# Patient Record
Sex: MALE | Race: WHITE
[De-identification: names, ages, dates, MRNs, and addresses within clinical notes are randomized per-mention and may not be internally consistent; named-entity substitution may affect disease eponyms.]

---

## 2019-06-15 ENCOUNTER — HOSPITAL ENCOUNTER (INPATIENT)
Dept: HOSPITAL 97 - ER | Age: 59
LOS: 2 days | Discharge: HOME | DRG: 190 | End: 2019-06-17
Attending: INTERNAL MEDICINE | Admitting: INTERNAL MEDICINE
Payer: COMMERCIAL

## 2019-06-15 DIAGNOSIS — J44.0: Primary | ICD-10-CM

## 2019-06-15 DIAGNOSIS — F17.210: ICD-10-CM

## 2019-06-15 DIAGNOSIS — Z99.81: ICD-10-CM

## 2019-06-15 DIAGNOSIS — J18.9: ICD-10-CM

## 2019-06-15 DIAGNOSIS — J44.1: ICD-10-CM

## 2019-06-15 LAB
ALBUMIN SERPL BCP-MCNC: 3.7 G/DL (ref 3.4–5)
ALP SERPL-CCNC: 60 U/L (ref 45–117)
ALT SERPL W P-5'-P-CCNC: 21 U/L (ref 12–78)
AST SERPL W P-5'-P-CCNC: 17 U/L (ref 15–37)
BUN BLD-MCNC: 16 MG/DL (ref 7–18)
COHGB MFR BLDA: 1.7 % (ref 0–1.5)
GLUCOSE SERPLBLD-MCNC: 119 MG/DL (ref 74–106)
HCT VFR BLD CALC: 43.1 % (ref 39.6–49)
INR BLD: 1.25
LYMPHOCYTES # SPEC AUTO: 0.8 K/UL (ref 0.7–4.9)
MAGNESIUM SERPL-MCNC: 1.8 MG/DL (ref 1.8–2.4)
MORPHOLOGY BLD-IMP: (no result)
NT-PROBNP SERPL-MCNC: 201 PG/ML (ref ?–125)
OXYHGB MFR BLDA: 96.9 % (ref 94–97)
PMV BLD: 9.2 FL (ref 7.6–11.3)
POTASSIUM SERPL-SCNC: 4 MMOL/L (ref 3.5–5.1)
RBC # BLD: 4.9 M/UL (ref 4.33–5.43)
SAO2 % BLDA: 99.3 % (ref 92–98.5)
TROPONIN (EMERG DEPT USE ONLY): < 0.02 NG/ML (ref 0–0.04)
UA COMPLETE W REFLEX CULTURE PNL UR: (no result)
UA DIPSTICK W REFLEX MICRO PNL UR: (no result)

## 2019-06-15 PROCEDURE — 85025 COMPLETE CBC W/AUTO DIFF WBC: CPT

## 2019-06-15 PROCEDURE — 83735 ASSAY OF MAGNESIUM: CPT

## 2019-06-15 PROCEDURE — 71045 X-RAY EXAM CHEST 1 VIEW: CPT

## 2019-06-15 PROCEDURE — 94660 CPAP INITIATION&MGMT: CPT

## 2019-06-15 PROCEDURE — 36415 COLL VENOUS BLD VENIPUNCTURE: CPT

## 2019-06-15 PROCEDURE — 87088 URINE BACTERIA CULTURE: CPT

## 2019-06-15 PROCEDURE — 80048 BASIC METABOLIC PNL TOTAL CA: CPT

## 2019-06-15 PROCEDURE — 96365 THER/PROPH/DIAG IV INF INIT: CPT

## 2019-06-15 PROCEDURE — 81003 URINALYSIS AUTO W/O SCOPE: CPT

## 2019-06-15 PROCEDURE — 85610 PROTHROMBIN TIME: CPT

## 2019-06-15 PROCEDURE — 99285 EMERGENCY DEPT VISIT HI MDM: CPT

## 2019-06-15 PROCEDURE — 94640 AIRWAY INHALATION TREATMENT: CPT

## 2019-06-15 PROCEDURE — 87040 BLOOD CULTURE FOR BACTERIA: CPT

## 2019-06-15 PROCEDURE — 83605 ASSAY OF LACTIC ACID: CPT

## 2019-06-15 PROCEDURE — 81015 MICROSCOPIC EXAM OF URINE: CPT

## 2019-06-15 PROCEDURE — 84145 PROCALCITONIN (PCT): CPT

## 2019-06-15 PROCEDURE — 83880 ASSAY OF NATRIURETIC PEPTIDE: CPT

## 2019-06-15 PROCEDURE — 93005 ELECTROCARDIOGRAM TRACING: CPT

## 2019-06-15 PROCEDURE — 96361 HYDRATE IV INFUSION ADD-ON: CPT

## 2019-06-15 PROCEDURE — 71046 X-RAY EXAM CHEST 2 VIEWS: CPT

## 2019-06-15 PROCEDURE — 87070 CULTURE OTHR SPECIMN AEROBIC: CPT

## 2019-06-15 PROCEDURE — 80076 HEPATIC FUNCTION PANEL: CPT

## 2019-06-15 PROCEDURE — 80053 COMPREHEN METABOLIC PANEL: CPT

## 2019-06-15 PROCEDURE — 84484 ASSAY OF TROPONIN QUANT: CPT

## 2019-06-15 PROCEDURE — 82805 BLOOD GASES W/O2 SATURATION: CPT

## 2019-06-15 PROCEDURE — 87205 SMEAR GRAM STAIN: CPT

## 2019-06-15 PROCEDURE — 87086 URINE CULTURE/COLONY COUNT: CPT

## 2019-06-15 PROCEDURE — 96375 TX/PRO/DX INJ NEW DRUG ADDON: CPT

## 2019-06-15 PROCEDURE — 87804 INFLUENZA ASSAY W/OPTIC: CPT

## 2019-06-15 PROCEDURE — 94760 N-INVAS EAR/PLS OXIMETRY 1: CPT

## 2019-06-15 RX ADMIN — ENOXAPARIN SODIUM SCH MG: 40 INJECTION SUBCUTANEOUS at 09:03

## 2019-06-15 RX ADMIN — SODIUM CHLORIDE SCH MLS: 0.9 INJECTION, SOLUTION INTRAVENOUS at 09:08

## 2019-06-15 RX ADMIN — NICOTINE SCH MG: 21 PATCH TRANSDERMAL at 09:03

## 2019-06-15 RX ADMIN — METHYLPREDNISOLONE SODIUM SUCCINATE SCH MG: 125 INJECTION, POWDER, FOR SOLUTION INTRAMUSCULAR; INTRAVENOUS at 18:26

## 2019-06-15 RX ADMIN — METHYLPREDNISOLONE SODIUM SUCCINATE SCH MG: 125 INJECTION, POWDER, FOR SOLUTION INTRAMUSCULAR; INTRAVENOUS at 12:29

## 2019-06-15 RX ADMIN — Medication SCH ML: at 21:23

## 2019-06-15 RX ADMIN — METHYLPREDNISOLONE SODIUM SUCCINATE SCH MG: 125 INJECTION, POWDER, FOR SOLUTION INTRAMUSCULAR; INTRAVENOUS at 05:12

## 2019-06-15 RX ADMIN — Medication SCH: at 09:00

## 2019-06-15 RX ADMIN — SODIUM CHLORIDE SCH MLS: 0.9 INJECTION, SOLUTION INTRAVENOUS at 16:54

## 2019-06-15 RX ADMIN — CEFTRIAXONE SCH MLS: 1 INJECTION, SOLUTION INTRAVENOUS at 06:07

## 2019-06-15 RX ADMIN — SODIUM CHLORIDE SCH MLS: 0.9 INJECTION, SOLUTION INTRAVENOUS at 05:12

## 2019-06-15 RX ADMIN — SODIUM CHLORIDE SCH: 0.9 INJECTION, SOLUTION INTRAVENOUS at 14:14

## 2019-06-15 NOTE — EKG
Test Date:    2019-06-15               Test Time:    01:11:17

Technician:   SCOOBY                                     

                                                     

MEASUREMENT RESULTS:                                       

Intervals:                                           

Rate:         105                                    

NJ:           134                                    

QRSD:         82                                     

QT:           338                                    

QTc:          446                                    

Axis:                                                

P:            79                                     

NJ:           134                                    

QRS:          114                                    

T:            60                                     

                                                     

INTERPRETIVE STATEMENTS:                                       

                                                     

Sinus tachycardia

Rightward  QRS axis

Abnormal ECG

Compared to ECG 03/16/2007 00:07:53

Sinus rhythm no longer present

no significant change from previous ECG

Electronically Signed On 06-15-19 08:04:59 CDT by Jacob Orlando

## 2019-06-15 NOTE — P.HP
Certification for Inpatient


Patient admitted to: Inpatient


With expected LOS: >2 Midnights


Practitioner: I am a practitioner with admitting privileges, knowledge of 

patient current condition, hospital course, and medical plan of care.


Services: Services provided to patient in accordance with Admission 

requirements found in Title 42 Section 412.3 of the Code of Federal Regulations





Patient History


Date of Service: 06/15/19


Reason for admission: COPD exacerbation


History of Present Illness: 





Mr Taylor is a 59 years old male with history of COPD on home oxygen 2 L per [place]

, tobacco abuse about 2 ppd, who start a couple of days ago with more 

productive cough. His sputum color is yellow, it change 2 days ago from white. 

Also start with progressive SOB, today the patient had fever, 104 F at home. He 

then came to ED for evaluation. At arrival O2 sat 93% on RA, WBC elevated 22.9K

, elevated procalcitonin with normal lactate. CXR consistent with possible 

multifocal pneumonia.


Home medications list reviewed: Yes





- Past Medical/Surgical History


-: COPD


-: tobacco abuse


-: mastoidectomy


-: aapendectomy





- Family History


Family History: Reviewed- Non-Contributory





- Social History


Smoking Status: Current every day smoker


Counseled patient to stop smoking for: less than 10 minutes


Smoking therapy provided: Yes


Patient receptive to therapy: Yes


CD- Drugs: No


Place of Residence: Home





Review of Systems


10-point ROS is otherwise unremarkable





Physical Examination





- Physical Exam


General: Alert


HEENT: Atraumatic, PERRLA, Mucous membr. moist/pink, EOMI, Sclerae nonicteric


Neck: Supple, 2+ carotid pulse no bruit, No LAD, Without JVD or thyroid 

abnormality


Respiratory: Diminished, Crackles/rales (bibasilar crackles)


Cardiovascular: Regular rate/rhythm, Normal S1 S2


Gastrointestinal: Normal bowel sounds, No tenderness


Musculoskeletal: No tenderness


Integumentary: No rashes


Neurological: Normal speech, Normal strength at 5/5 x4 extr, Normal tone, 

Normal affect


Lymphatics: No axilla or inguinal lymphadenopathy





- Studies


Laboratory Data (last 24 hrs)





06/15/19 01:05: PT 14.6 H, INR 1.25


06/15/19 01:05: WBC 22.6 H*, Hgb 14.5, Hct 43.1, Plt Count 309


06/15/19 01:05: Sodium 135 L, Potassium 4.0, BUN 16, Creatinine 1.01, Glucose 

119 H, Magnesium 1.8, Total Bilirubin 2.0 H, AST 17, ALT 21, Alkaline 

Phosphatase 60





Microbiology Data (last 24 hrs): 








06/15/19 01:10   Nasopharnyx   Influenza Type A Antigen Screen - Final


06/15/19 01:10   Nasopharnyx   Influenza Type B Antigen Screen - Final








Assessment and Plan





- Problems (Diagnosis)


(1) COPD exacerbation


Current Visit: Yes   Status: Acute   





(2) Multifocal pneumonia


Current Visit: Yes   Status: Acute   





(3) Tobacco abuse


Current Visit: Yes   Status: Acute   





- Plan





Will start empiric treatment with  IV Rocephin, Azithromycin and steroids. 

Continue with breathing treatment as needed. Continue O2 support.





- Advance Directives


Does patient have a Living Will: No


Does patient have a Durable POA for Healthcare: No





- Code Status/Comfort Care


Code Status Assessed: Yes


Code Status: Full Code

## 2019-06-15 NOTE — EDPHYS
Physician Documentation                                                                           

 [facility]                                                                 

Name: Jaxon Taylor                                                                             

Age: 59 yrs                                                                                       

Sex: Male                                                                                         

: 1960                                                                                   

MRN: [id number]                                                                                   

Arrival Date: 06/15/2019                                                                          

Time: 00:54                                                                                       

Account#: [id number]                                                                            

Bed 2                                                                                             

Private MD:                                                                                       

ED Physician Renzo Ferreira                                                                      

HPI:                                                                                              

06/15                                                                                             

01:09 This 59 yrs old  Male presents to ER via Wheelchair with complaints of         liza 

      Breathing Difficulty.                                                                       

01:09 The patient has shortness of breath at rest, with light activity. Onset: The            liza 

      symptoms/episode began/occurred 2 day(s) ago. Duration: The symptoms are continuous,        

      and are steadily getting worse. The patient's shortness of breath has no apparent           

      modifying factors. Associated signs and symptoms: The patient has no apparent               

      associated signs or symptoms. Severity of symptoms: At their worst the symptoms were        

      moderate severe in the emergency department the symptoms are unchanged. The patient has     

      experienced similar episodes in the past, multiple times.                                   

                                                                                                  

Historical:                                                                                       

- Allergies:                                                                                      

01:07 No Known Allergies;                                                                     ak1 

- Home Meds:                                                                                      

01:07 ProAir HFA inhalation inhalation [Active]; Symbicort inhalation inhalation [Active];    ak1 

      Albuterol Nebulizer [Active];                                                               

- PMHx:                                                                                           

01:07 COPD; Emphysema;                                                                        ak1 

- PSHx:                                                                                           

01:07 Mastoidectomy; Appendectomy;                                                            ak1 

                                                                                                  

- Immunization history:: Adult Immunizations unknown.                                             

- Social history:: Smoking status: Patient uses tobacco products, smokes one pack                 

  cigarettes per day.                                                                             

- Ebola Screening: : No symptoms or risks identified at this time.                                

- Family history:: not pertinent.                                                                 

                                                                                                  

                                                                                                  

ROS:                                                                                              

01:09 Eyes: Negative for injury, pain, redness, and discharge, ENT: Negative for injury,      liza 

      pain, and discharge, Neck: Negative for injury, pain, and swelling, Cardiovascular:         

      Negative for chest pain, palpitations, and edema, Abdomen/GI: Negative for abdominal        

      pain, nausea, vomiting, diarrhea, and constipation, Back: Negative for injury and pain,     

      : Negative for injury, bleeding, discharge, and swelling, MS/Extremity: Negative for      

      injury and deformity, Skin: Negative for injury, rash, and discoloration, Neuro:            

      Negative for headache, weakness, numbness, tingling, and seizure, Psych: Negative for       

      depression, anxiety, suicide ideation, homicidal ideation, and hallucinations,              

      Allergy/Immunology: Negative for hives, rash, and allergies, Endocrine: Negative for        

      neck swelling, polydipsia, polyuria, polyphagia, and marked weight changes,                 

      Hematologic/Lymphatic: Negative for swollen nodes, abnormal bleeding, and unusual           

      bruising.                                                                                   

01:09 Constitutional: Positive for body aches, chills, fatigue, fever.                            

01:09 Respiratory: Positive for cough, shortness of breath, wheezing, inspiratory, expiratory.    

                                                                                                  

Exam:                                                                                             

:09 Head/Face:  Normocephalic, atraumatic. Eyes:  Pupils equal round and reactive to light, liza 

      extra-ocular motions intact.  Lids and lashes normal.  Conjunctiva and sclera are           

      non-icteric and not injected.  Cornea within normal limits.  Periorbital areas with no      

      swelling, redness, or edema. ENT:  Nares patent. No nasal discharge, no septal              

      abnormalities noted.  Tympanic membranes are normal and external auditory canals are        

      clear.  Oropharynx with no redness, swelling, or masses, exudates, or evidence of           

      obstruction, uvula midline.  Mucous membranes moist. Neck:  Trachea midline, no             

      thyromegaly or masses palpated, and no cervical lymphadenopathy.  Supple, full range of     

      motion without nuchal rigidity, or vertebral point tenderness.  No Meningismus.             

      Chest/axilla:  Normal chest wall appearance and motion.  Nontender with no deformity.       

      No lesions are appreciated. Abdomen/GI:  Soft, non-tender, with normal bowel sounds.        

      No distension or tympany.  No guarding or rebound.  No evidence of tenderness               

      throughout. Back:  No spinal tenderness.  No costovertebral tenderness.  Full range of      

      motion. Male :  Normal genitalia with no discharge or lesions. Skin:  Warm, dry with      

      normal turgor.  Normal color with no rashes, no lesions, and no evidence of cellulitis.     

      MS/ Extremity:  Pulses equal, no cyanosis.  Neurovascular intact.  Full, normal range       

      of motion. Neuro:  Awake and alert, GCS 15, oriented to person, place, time, and            

      situation.  Cranial nerves II-XII grossly intact.  Motor strength 5/5 in all                

      extremities.  Sensory grossly intact.  Cerebellar exam normal.  Normal gait. Psych:         

      Awake, alert, with orientation to person, place and time.  Behavior, mood, and affect       

      are within normal limits.                                                                   

:09 Constitutional: The patient appears febrile.                                                

:09 Cardiovascular: Rate: tachycardic, Rhythm: regular, Pulses: Pulses are 4+ in bilateral      

      radial, brachial, femoral, popliteal, posterior tibial and and dorsalis pedis               

      arteries.. Heart sounds: normal, Edema: is not appreciated, JVD: is not appreciated.        

                                                                                                  

Vital Signs:                                                                                      

01:03  / 76; Pulse 102; Resp 28; Temp 100.1(O); Pulse Ox 93% on R/A; Weight 70.31 kg    ak1 

      (R); Height 5 ft. 11 in. (180.34 cm) (R); Pain 0/10;                                        

01:50  / 66; Pulse 101; Resp 30; Temp 100.1; Pulse Ox 98% on BiPAP;                     rr5 

02:30  / 62; Pulse 102; Resp 29; Pulse Ox 93% on BiPAP;                                 rr5 

03:23 BP 96 / 70; Pulse 91; Resp 29; Temp 99; Pulse Ox 95% on BiPAP;                          rr5 

01:03 Body Mass Index 21.62 (70.31 kg, 180.34 cm)                                             ak1 

01:03 pt uses 2L via NC at home                                                               ak1 

01:50 with ongoing nebulization                                                               rr5 

                                                                                                  

MDM:                                                                                              

01:00 Patient medically screened.                                                             [facility] 

01:16 Data reviewed: vital signs, nurses notes, lab test result(s), EKG, radiologic studies,  liza 

      plain films.                                                                                

                                                                                                  

06/15                                                                                             

01:08 Order name: Basic Metabolic Panel                                                       [facility] 

06/15                                                                                             

01:08 Order name: CBC with Diff                                                               [facility] 

06/15                                                                                             

01:08 Order name: LFT's                                                                       [facility] 

06/15                                                                                             

01:08 Order name: Magnesium; Complete Time: 02:15                                             [facility] 

06/15                                                                                             

01:08 Order name: NT PRO-BNP                                                                  [facility] 

06/15                                                                                             

01:08 Order name: PT-INR; Complete Time: 02:15                                                [facility] 

06/15                                                                                             

01:08 Order name: Troponin (emerg Dept Use Only); Complete Time: 02:15                        [facility] 

06/15                                                                                             

01:08 Order name: Blood Culture Adult (2)                                                     [facility] 

06/15                                                                                             

01:08 Order name: Lactate; Complete Time: 02:15                                               [facility] 

06/15                                                                                             

01:08 Order name: Procalcitonin; Complete Time: 02:15                                         [facility] 

06/15                                                                                             

01:08 Order name: ABG; Complete Time: 02:15                                                   [facility] 

06/15                                                                                             

01:08 Order name: Flu; Complete Time: 02:15                                                   [facility] 

06/15                                                                                             

01:11 Order name: Basic Metabolic Panel; Complete Time: 02:15                                 [facility]

06/15                                                                                             

01:11 Order name: CBC with Automated Diff                                                     [facility]

06/15                                                                                             

01:08 Order name: XRAY Chest (1 view)                                                         [facility] 

06/15                                                                                             

01:08 Order name: EKG; Complete Time: 01:13                                                   [facility] 

06/15                                                                                             

01:08 Order name: Cardiac monitoring; Complete Time: 01:15                                    [facility] 

06/15                                                                                             

01:08 Order name: BIPAP                                                                       [facility] 

06/15                                                                                             

01:11 Order name: Liver (Hepatic) Function; Complete Time: 02:15                              [facility]

06/15                                                                                             

01:12 Order name: NT PRO-BNP; Complete Time: 02:15                                            [facility]

06/15                                                                                             

01:58 Order name: Manual Differential                                                         [facility]

06/15                                                                                             

01:08 Order name: EKG - Nurse/Tech; Complete Time: 01:15                                      [facility] 

06/15                                                                                             

01:08 Order name: IV Saline Lock; Complete Time: 01:15                                        [facility] 

06/15                                                                                             

01:08 Order name: Labs collected and sent; Complete Time: 01:14                               [facility] 

06/15                                                                                             

01:08 Order name: O2 Per Protocol; Complete Time: 01:14                                       [facility] 

06/15                                                                                             

01:08 Order name: O2 Sat Monitoring; Complete Time: 01:15                                     [facility] 

                                                                                                  

Administered Medications:                                                                         

01:20 Drug: Pepcid 20 mg Route: IVP; Site: right forearm;                                     rr5 

02:20 Follow up: Response: No adverse reaction                                                rr5 

01:25 Drug: SOLU-Medrol 125 mg Route: IVP; Site: right forearm;                               rr5 

02:25 Follow up: Response: No adverse reaction                                                rr5 

01:25 Drug: Albuterol - atroVENT (3:1) (2.5 mg - 0.5 mg) 3 ml Route: Nebulizer;               rr5 

02:20 Follow up: Response: No adverse reaction; Marked relief of symptoms                     rr5 

01:40 Drug: NS 0.9% 500 ml Route: IV; Rate: bolus; Site: right forearm;                       rr5 

02:20 Follow up: Response: No adverse reaction; IV Status: Completed infusion; IV Intake:     rr5 

      500ml                                                                                       

01:40 Drug: Zofran 4 mg Route: IVP; Site: right forearm;                                      rr5 

02:40 Follow up: Response: No adverse reaction                                                rr5 

01:41 Drug: NS 0.9% 1000 ml Route: IV; Rate: 125 ml/hr; Site: right forearm;                  rr5 

03:16 Follow up: Response: No adverse reaction; IV Status: Infusion continued upon admission; rr5 

      IV Intake: 125ml                                                                            

01:50 Drug: Zosyn 3.375 grams Route: IVPB; Infused Over: 60 mins; Site: right forearm;        rr5 

02:50 Follow up: Response: No adverse reaction; IV Status: Completed infusion; IV Intake:     rr5 

      100ml                                                                                       

02:15 Drug: Tylenol 650 mg Route: PO;                                                         rr5 

03:15 Follow up: Response: No adverse reaction                                                rr5 

02:51 Dru mg of (Zithromax 500 mg, NS 0.9% 250 ml) Route: IVPB; Infused Over: 1 hrs;    rr5 

      Site: right forearm;                                                                        

                                                                                                  

                                                                                                  

Disposition:                                                                                      

06/15/19 01:16 Hospitalization ordered by Violeta Morse for Inpatient Admission. Preliminary    

  diagnosis are Fever, unspecified, Pneumonia due to other specified bacteria,                    

  Hypoxemia, Tobacco abuse counseling, Tobacco use, Chronic obstructive                           

  pulmonary disease with (acute) exacerbation, Elevated white blood cell count,                   

  Bandemia.                                                                                       

- Bed requested for Telemetry/MedSurg (Inpatient).                                                

- Status is Inpatient Admission.                                                              rr5 

- Condition is Fair.                                                                              

- Problem is new.                                                                                 

- Symptoms have improved.                                                                         

UTI on Admission? No                                                                              

                                                                                                  

                                                                                                  

                                                                                                  

Signatures:                                                                                       

Dispatcher [employer]                           EDRenzo Espinosa MD MD cha Therrien, Shelly, FNP-C                 FNP-Csnw                                                  

Malu Du RN                       RN   ak1                                                  

Elana Velarde RN                       RN                                                      

Devon Gamez RN                      RN   rr5                                                  

                                                                                                  

Corrections: (The following items were deleted from the chart)                                    

02:27 01:16 Hospitalization Ordered by Violeta Morse MD for Inpatient Admission. Preliminary [facility] 

      diagnosis is Fever, unspecified; Pneumonia due to other specified bacteria; Hypoxemia;      

      Tobacco abuse counseling; Tobacco use; Chronic obstructive pulmonary disease with           

      (acute) exacerbation. Bed requested for Telemetry/MedSurg (Inpatient). Status is            

      Inpatient Admission. Condition is Fair. Problem is new. Symptoms have improved. UTI on      

      Admission? No. liza                                                                          

02:50 02:27 06/15/2019 01:16 Hospitalization Ordered by Violeta Morse MD for Inpatient       liza 

      Admission. Preliminary diagnosis is Fever, unspecified; Pneumonia due to other              

      specified bacteria; Hypoxemia; Tobacco abuse counseling; Tobacco use; Chronic               

      obstructive pulmonary disease with (acute) exacerbation; Elevated white blood cell          

      count. Bed requested for Telemetry/MedSurg (Inpatient). Status is Inpatient Admission.      

      Condition is Fair. Problem is new. Symptoms have improved. UTI on Admission? No. liza        

03:10 02:50 06/15/2019 01:16 Hospitalization Ordered by Violeta Morse MD for Inpatient       cg  

      Admission. Preliminary diagnosis is Fever, unspecified; Pneumonia due to other              

      specified bacteria; Hypoxemia; Tobacco abuse counseling; Tobacco use; Chronic               

      obstructive pulmonary disease with (acute) exacerbation; Elevated white blood cell          

      count; Bandemia. Bed requested for Telemetry/MedSurg (Inpatient). Status is Inpatient       

      Admission. Condition is Fair. Problem is new. Symptoms have improved. UTI on Admission?     

      No. liza                                                                                     

03:55 03:10 06/15/2019 01:16 Hospitalization Ordered by Violeta Morse MD for Inpatient       rr5 

      Admission. Preliminary diagnosis is Fever, unspecified; Pneumonia due to other              

      specified bacteria; Hypoxemia; Tobacco abuse counseling; Tobacco use; Chronic               

      obstructive pulmonary disease with (acute) exacerbation; Elevated white blood cell          

      count; Bandemia. Bed requested for Telemetry/MedSurg (Inpatient). Status is Inpatient       

      Admission. Condition is Fair. Problem is new. Symptoms have improved. UTI on Admission?     

      No. cg                                                                                      

                                                                                                  

**************************************************************************************************

## 2019-06-15 NOTE — ER
Nurse's Notes                                                                                     

 [facility]                                                                 

Name: Jaxon Taylor                                                                             

Age: 59 yrs                                                                                       

Sex: Male                                                                                         

: 1960                                                                                   

MRN: [id number]                                                                                   

Arrival Date: 06/15/2019                                                                          

Time: 00:54                                                                                       

Account#: [id number]                                                                            

Bed 2                                                                                             

Private MD:                                                                                       

Diagnosis: Fever, unspecified;Pneumonia due to other specified bacteria;Hypoxemia;Tobacco abuse   

  counseling;Tobacco use;Chronic obstructive pulmonary disease with (acute)                       

  exacerbation;Elevated white blood cell count;Bandemia                                           

                                                                                                  

Presentation:                                                                                     

06/15                                                                                             

01:04 Presenting complaint: Patient states: SOB, vomiting, fever started today. pt stated     ak1 

      fever of 104 at home today. pt took advil at 2000, vomited after. pt uses 2L oxygen via     

      NC at home. Transition of care: patient was not received from another setting of care.      

      Onset of symptoms was Kendy 15, 2019. Risk Assessment: Do you want to hurt yourself or       

      someone else? Patient reports no desire to harm self or others. Initial Sepsis Screen:      

      Does the patient meet any 2 criteria? RR > 20 per min. Temp <36.0*C (96.8*F)) or >          

      38.3*C (100.9*F). Yes Does the patient have a suspected source of infection? Yes:           

      Productive cough/pneumonia If YES to both, name of provider notified: Renzo Ferreira MD. Care prior to arrival: None.                                                            

01:04 Acuity: FROILAN 1                                                                           ak1 

01:04 Method Of Arrival: Wheelchair                                                           ak1 

                                                                                                  

Triage Assessment:                                                                                

01:07 General: Appears distressed, uncomfortable, slender, Behavior is calm, cooperative.     ak1 

      Pain: Denies pain. EENT: No signs and/or symptoms were reported regarding the EENT          

      system. Neuro: Level of Consciousness is awake, alert, obeys commands, Oriented to          

      person, place, time, situation,  are equal bilaterally Moves all extremities. Gait     

      is unsteady, Speech is normal. Cardiovascular: No deficits noted. Respiratory: Reports      

      shortness of breath since today cough that is non-productive, labored breathing Airway      

      is patent Trachea midline Respiratory effort is labored, gasping, with retractions,         

      Respiratory pattern is tachypnea Breath sounds with wheezes Onset: The symptoms/episode     

      began/occurred today, the patient has moderate shortness of breath. GI: Abdomen is          

      flat, Bowel sounds present X 4 quads. Abd is soft and non tender X 4 quads. Reports         

      vomiting. : No signs and/or symptoms were reported regarding the genitourinary            

      system. Derm: No signs and/or symptoms reported regarding the dermatologic system.          

      Musculoskeletal: No signs and/or symptoms reported regarding the musculoskeletal system.    

                                                                                                  

Historical:                                                                                       

- Allergies:                                                                                      

01:07 No Known Allergies;                                                                     ak1 

- Home Meds:                                                                                      

01:07 ProAir HFA inhalation inhalation [Active]; Symbicort inhalation inhalation [Active];    ak1 

      Albuterol Nebulizer [Active];                                                               

- PMHx:                                                                                           

01:07 COPD; Emphysema;                                                                        ak1 

- PSHx:                                                                                           

01:07 Mastoidectomy; Appendectomy;                                                            ak1 

                                                                                                  

- Immunization history:: Adult Immunizations unknown.                                             

- Social history:: Smoking status: Patient uses tobacco products, smokes one pack                 

  cigarettes per day.                                                                             

- Ebola Screening: : No symptoms or risks identified at this time.                                

- Family history:: not pertinent.                                                                 

                                                                                                  

                                                                                                  

Screenin:10 Abuse screen: Denies threats or abuse. Denies injuries from another. Nutritional        ak1 

      screening: No deficits noted. Tuberculosis screening: No symptoms or risk factors           

      identified. Fall Risk None identified.                                                      

                                                                                                  

Assessment:                                                                                       

01:05 General: Appears uncomfortable, Behavior is calm, cooperative, appropriate for age,     rr5 

      Reports fever for. Pain: Complains of pain in head Pain does not radiate. Pain Quality      

      of pain is described as aching, Pain began gradually, Is intermittent. Neuro: Level of      

      Consciousness is awake, alert, obeys commands, Oriented to person, place, time,             

      situation, Appropriate for age. Cardiovascular: Capillary refill < 3 seconds Patient's      

      skin is warm and dry. Rhythm is sinus tachycardia. Respiratory: Reports shortness of        

      breath using oxygen 2 L via nasal cannula at home. Airway is patent Respiratory effort      

      is labored, pursed lip, Respiratory pattern is tachypnea.                                   

01:05 GI: No signs and/or symptoms were reported involving the gastrointestinal system. :   rr5 

      No signs and/or symptoms were reported regarding the genitourinary system. EENT: No         

      signs and/or symptoms were reported regarding the EENT system. Derm: Skin is intact,        

      Skin temperature is warm. Musculoskeletal: Capillary refill < 3 seconds.                    

01:05 Respiratory: Breath sounds with wheezes bilaterally.                                    rr5 

01:30 Reassessment: No changes from previously documented assessment. hooked to BIPAP.        rr5 

01:40 Reassessment: vomited once. greenish and thick in consistency. ED provider aware with   rr5 

      order made d carried out.                                                                   

01:57 Reassessment: noman from laboratory relayed critical laboratory WBC 22.6. ED provider  rr5 

      aware.                                                                                      

02:25 Reassessment: Patient appears in no apparent distress at this time. Patient is alert,   rr5 

      oriented x 3, equal unlabored respirations, skin warm/dry/pink. asleep on bed. nausea       

      and vomiting resolved as stated by patient. Patient states feeling better. Patient          

      states symptoms have improved.                                                              

                                                                                                  

Vital Signs:                                                                                      

01:03  / 76; Pulse 102; Resp 28; Temp 100.1(O); Pulse Ox 93% on R/A; Weight 70.31 kg    ak1 

      (R); Height 5 ft. 11 in. (180.34 cm) (R); Pain 0/10;                                        

01:50  / 66; Pulse 101; Resp 30; Temp 100.1; Pulse Ox 98% on BiPAP;                     rr5 

02:30  / 62; Pulse 102; Resp 29; Pulse Ox 93% on BiPAP;                                 rr5 

03:23 BP 96 / 70; Pulse 91; Resp 29; Temp 99; Pulse Ox 95% on BiPAP;                          rr5 

01:03 Body Mass Index 21.62 (70.31 kg, 180.34 cm)                                             ak1 

01:03 pt uses 2L via NC at home                                                               ak1 

01:50 with ongoing nebulization                                                               rr5 

                                                                                                  

ED Course:                                                                                        

00:54 Patient arrived in ED.                                                                  es  

01:00 Renzo Ferreira MD is Attending Physician.                                             [facility] 

01:05 Triage completed.                                                                       ak1 

01:07 Arm band placed on Patient placed in an exam room, on a stretcher, on oxygen, on        ak1 

      cardiac monitor, on pulse oximetry, Patient notified of wait time. EKG completed in         

      triage. Results shown to MD.                                                                

01:10 Patient has correct armband on for positive identification. Placed in gown. Bed in low  ak1 

      position. Call light in reach. Side rails up X2. Adult w/ patient. Cardiac monitor on.      

      Pulse ox on. NIBP on. Warm blanket given.                                                   

01:10 Initial lab(s) drawn, by ED staff, held in ED. EKG done, by ED staff, reviewed by Renzo Ferreira MD. Inserted saline lock: 20 gauge in right forearm, using aseptic technique.      

      ,using aseptic technique. placed by Devon CALDWELL Blood collected. Oxygen administration       

      via nasal cannula \T\ 4L/min.                                                                 

01:13 Violeta Morse MD is Hospitalizing Provider.                                          [facility] 

01:24 X-ray completed. Portable x-ray completed in exam room. Patient tolerated procedure     kw  

      well.                                                                                       

01:25 XRAY Chest (1 view) In Process Unspecified.                                             EDMS

01:40 Devon Gamez RN is Primary Nurse.                                                    rr5 

02:48 Notified ED physician of a critical lab result(s). Band count of 17% Dr Jhon hernandez  

      notified.                                                                                   

03:24 No provider procedures requiring assistance completed. Patient admitted, IV remains in  rr5 

      place. intact, No redness/swelling at site.                                                 

                                                                                                  

Administered Medications:                                                                         

01:20 Drug: Pepcid 20 mg Route: IVP; Site: right forearm;                                     rr5 

02:20 Follow up: Response: No adverse reaction                                                rr5 

01:25 Drug: SOLU-Medrol 125 mg Route: IVP; Site: right forearm;                               rr5 

02:25 Follow up: Response: No adverse reaction                                                rr5 

01:25 Drug: Albuterol - atroVENT (3:1) (2.5 mg - 0.5 mg) 3 ml Route: Nebulizer;               rr5 

02:20 Follow up: Response: No adverse reaction; Marked relief of symptoms                     rr5 

01:40 Drug: NS 0.9% 500 ml Route: IV; Rate: bolus; Site: right forearm;                       rr5 

02:20 Follow up: Response: No adverse reaction; IV Status: Completed infusion; IV Intake:     rr5 

      500ml                                                                                       

01:40 Drug: Zofran 4 mg Route: IVP; Site: right forearm;                                      rr5 

02:40 Follow up: Response: No adverse reaction                                                rr5 

01:41 Drug: NS 0.9% 1000 ml Route: IV; Rate: 125 ml/hr; Site: right forearm;                  rr5 

03:16 Follow up: Response: No adverse reaction; IV Status: Infusion continued upon admission; rr5 

      IV Intake: 125ml                                                                            

01:50 Drug: Zosyn 3.375 grams Route: IVPB; Infused Over: 60 mins; Site: right forearm;        rr5 

02:50 Follow up: Response: No adverse reaction; IV Status: Completed infusion; IV Intake:     rr5 

      100ml                                                                                       

02:15 Drug: Tylenol 650 mg Route: PO;                                                         rr5 

03:15 Follow up: Response: No adverse reaction                                                rr5 

02:51 Dru mg of (Zithromax 500 mg, NS 0.9% 250 ml) Route: IVPB; Infused Over: 1 hrs;    rr5 

      Site: right forearm;                                                                        

                                                                                                  

                                                                                                  

Intake:                                                                                           

02:20 IV: 500ml; Total: 500ml.                                                                rr5 

02:50 IV: 100ml; Total: 600ml.                                                                rr5 

03:16 IV: 125ml; Total: 725ml.                                                                rr5 

                                                                                                  

Outcome:                                                                                          

01:16 Decision to Hospitalize by Provider.                                                    liza 

03:35 Admitted to Med/surg accompanied by tech, via stretcher, room 206, with oxygen, with    rr5 

      chart, Report called to  lluvia                                                              

03:35 Condition: stable                                                                           

03:35 Instructed on the need for admit.                                                           

03:55 Patient left the ED.                                                                    rr5 

                                                                                                  

Signatures:                                                                                       

Dispatcher [employer]                           Renzo Dubois MD MD cha Salyer, Edna es Ballard, Brenda, RN                     RN   Marianela Teixeira Amber, RN                       RN   ak1                                                  

Devon Gamez, JAVI                      RN   rr5                                                  

                                                                                                  

**************************************************************************************************

## 2019-06-15 NOTE — PN
Date of Progress Note:  06/15/2019



Subjective:  The patient seen and examined.  Chart reviewed and case discussed with RN.  The patient 
states still having significant cough with sputum production.  Had high fevers at home.  The patient 
did spike temperature 100.1 early this morning.



Medications:  List reviewed.



Physical Examination:

Vital Signs:  Temperature 98.4, heart rate 88, blood pressure 103/59, respirations 20, O2 94% on room
 air. 

General:  Awake, alert, oriented x3.  Appears older than stated age, in some mild respiratory distres
s. 

CV:  S1 and S2.  Regular rate and rhythm.  Peripheral pulses present. 

Respiratory:  Diminished breath sounds, diffuse wheezing. 

Gastrointestinal:  Abdomen is soft, nontender, nondistended.  Positive bowel sounds. 

Extremities:  No clubbing, cyanosis, or edema. 

Neurologic:  Nonfocal.



Laboratory Data:  Lactate is 1.8.  Procalcitonin 0.83.  WBC 22.6, H and H 14.5/43.1, platelets 309, 1
7% bands.  Blood cultures and urine culture pending.



Assessment And Plan:  A 59-year-old male with

1.Acute chronic obstructive pulmonary disease exacerbation.  Continue with nebulizer treatments, sup
plemental oxygen.  Pulmonology has been consulted.

2.Multifocal pneumonia.  We will continue with IV antibiotics.  Follow up on cultures.

3.Nicotine dependence with cigarette smoking, continuous.  The patient has been re-counseled. 



Plan:  We will continue with current treatment, follow up on culture results, likely discharge in the
 next 24-48 hours depending on clinical response.





/DEVON

DD:  06/15/2019 10:02:20Voice ID:  [id number]

DT:  06/15/2019 13:57:31Report ID:  [id number]

## 2019-06-16 LAB
BUN BLD-MCNC: 14 MG/DL (ref 7–18)
GLUCOSE SERPLBLD-MCNC: 140 MG/DL (ref 74–106)
HCT VFR BLD CALC: 37.4 % (ref 39.6–49)
LYMPHOCYTES # SPEC AUTO: 0.7 K/UL (ref 0.7–4.9)
MAGNESIUM SERPL-MCNC: 2.4 MG/DL (ref 1.8–2.4)
PMV BLD: 9.2 FL (ref 7.6–11.3)
POTASSIUM SERPL-SCNC: 4.4 MMOL/L (ref 3.5–5.1)
RBC # BLD: 4.19 M/UL (ref 4.33–5.43)

## 2019-06-16 RX ADMIN — SODIUM CHLORIDE SCH MLS: 0.9 INJECTION, SOLUTION INTRAVENOUS at 00:19

## 2019-06-16 RX ADMIN — DOXYCYCLINE SCH MG: 100 CAPSULE ORAL at 11:57

## 2019-06-16 RX ADMIN — METHYLPREDNISOLONE SODIUM SUCCINATE SCH MG: 125 INJECTION, POWDER, FOR SOLUTION INTRAMUSCULAR; INTRAVENOUS at 00:18

## 2019-06-16 RX ADMIN — Medication SCH ML: at 21:47

## 2019-06-16 RX ADMIN — ARFORMOTEROL TARTRATE SCH MCG: 15 SOLUTION RESPIRATORY (INHALATION) at 20:00

## 2019-06-16 RX ADMIN — METHYLPREDNISOLONE SODIUM SUCCINATE SCH MG: 125 INJECTION, POWDER, FOR SOLUTION INTRAMUSCULAR; INTRAVENOUS at 05:51

## 2019-06-16 RX ADMIN — ENOXAPARIN SODIUM SCH: 40 INJECTION SUBCUTANEOUS at 08:43

## 2019-06-16 RX ADMIN — Medication SCH ML: at 08:44

## 2019-06-16 RX ADMIN — CEFEPIME SCH MLS: 1 INJECTION, POWDER, FOR SOLUTION INTRAMUSCULAR; INTRAVENOUS at 11:57

## 2019-06-16 RX ADMIN — CEFEPIME SCH MLS: 1 INJECTION, POWDER, FOR SOLUTION INTRAMUSCULAR; INTRAVENOUS at 21:46

## 2019-06-16 RX ADMIN — NICOTINE SCH MG: 21 PATCH TRANSDERMAL at 08:43

## 2019-06-16 RX ADMIN — SODIUM CHLORIDE SCH MLS: 0.9 INJECTION, SOLUTION INTRAVENOUS at 08:45

## 2019-06-16 RX ADMIN — CEFTRIAXONE SCH MLS: 1 INJECTION, SOLUTION INTRAVENOUS at 05:52

## 2019-06-16 RX ADMIN — ARFORMOTEROL TARTRATE SCH MCG: 15 SOLUTION RESPIRATORY (INHALATION) at 10:40

## 2019-06-16 RX ADMIN — IPRATROPIUM BROMIDE SCH: 0.5 SOLUTION RESPIRATORY (INHALATION) at 14:00

## 2019-06-16 RX ADMIN — IPRATROPIUM BROMIDE SCH MG: 0.5 SOLUTION RESPIRATORY (INHALATION) at 20:00

## 2019-06-16 RX ADMIN — DOXYCYCLINE SCH MG: 100 CAPSULE ORAL at 21:46

## 2019-06-16 NOTE — P.CNS
Date of Consult: 06/16/19


Chief Complaint: Pneumonia


History of Present Illness: 





Patient is 59 years of age well known to me with a history of severe COPD 

admitted with a sudden onset of fever chills productive cough and was admitted 

with bilateral pneumonia he recently finished a course of levofloxacin does 

have to take levofloxacin on an intermittent basis he is feeling a little bit 

better patient admitted with bilateral pneumonia elevated white count


Allergies





No Known Allergies Allergy (Verified 06/15/19 04:50)


 





Home Medications: 








Albuterol Neb [Proventil 0.083% Neb Soln] 1 unit IN QID 06/15/19 


Albuterol Sulfate [Proair Respiclick] 2 puff IN QID PRN 06/15/19 


Budesonide/Formoterol Fumarate [Symbicort 160-4.5 Mcg Inhaler] 1 puff IN DAILY 

06/15/19 


Tiotropium Bromide [Spiriva] 1 puff IN DAILY 06/15/19 








- Past Medical/Surgical History


Diabetic: No


-: COPD - emphysema


-: tobacco abuse


-: pnuemonia


-: mastoidectomy


-: aapendectomy





- Social History


Smoking Status: Current every day smoker


Alcohol use: No


CD- Drugs: No


Caffeine use: Yes


Place of Residence: Home





Review of Systems


10-point ROS is otherwise unremarkable


General: Weakness


Respiratory: Cough, Shortness of Breath





Physical Examination














Temp Pulse Resp BP Pulse Ox


 


 98.1 F   95 H  19   107/60   94 


 


 06/16/19 08:00  06/16/19 08:00  06/16/19 08:00  06/16/19 08:00  06/16/19 08:00








General: Alert, In no apparent distress, Oriented x3


Respiratory: Crackles/rales, Expiratory wheezes


Cardiovascular: No edema, Regular rate/rhythm, Normal S1 S2





- Problems


(1) Multifocal pneumonia


Current Visit: Yes   Status: Acute   


Plan: 


Patient is 59 years of age admitted with multifocal pneumonia left worse than 

right elevated white count sudden onset of fever chills productive cough he has 

had a history of severe COPD still continues to smoke.  Blood cultures are so 

far negative these he recently had a course of levofloxacin as an outpatient he 

is at risk for resistant organisms I have changed the medication to IV cefepime 

and p.o. doxycycline for the possibility of an Mr SA infection sputum cultures 

are pending all labs reviewed I have added some bronchodilators Dc IV fluids 

change to p.o. prednisone

## 2019-06-16 NOTE — PN
Date of Progress Note:  06/16/2019



Subjective:  The patient seen and examined.  Chart reviewed and case discussed 
with RN and Dr. Ha.  The patient continues to have significant amount of 
cough and sputum production, still hypoxic requiring oxygen around the clock.  
Medications list reviewed.



Physical Examination:

Vital Signs:  Temperature 98.1, heart rate 95, blood pressure 107/60, 
respirations 19, and O2 94% on 2 L room air and desaturates to 87%. 

General:  Awake, alert, oriented x3, ill-appearing male, in some mild 
respiratory distress 

CV:  S1, S2.  Regular rate and rhythm.  Peripheral pulses weak. 

Respiratory:  Diminished breath sounds.  Diffuse expiratory wheezes.  No use of 
accessory muscles. 

Gastrointestinal:  Abdomen is soft, nontender, nondistended.  Positive bowel 
sounds. 

Extremities:  No clubbing, cyanosis, or edema. 

Neurologic:  Nonfocal.



Laboratory Data:  Sodium 143, potassium 4.4, chloride 110, CO2 27, BUN 14, 
creatinine 0.67, glucose 140.  Repeat lactate yesterday was 2.6, calcium 7.9, 
magnesium 2.4.  WBC 19.4, H and H 12.5 and 37.4, platelets 271, neutrophils 93%
.  Blood cultures, no growth to date.  Sputum culture, normal quantity of 
respiratory felicia.  Urine culture, no growth to date as well.



Assessment And Plan:  A 59-year-old male with:

1.   Multifocal pneumonia.  IV antibiotics have been adjusted.  Appreciate 
Pulmonology input.  The patient is at risk for MRSA.  The patient completed 
recent course of Levaquin outpatient therefore failed outpatient treatment.

2.   Acute chronic obstructive pulmonary disease exacerbation.  Continue 
nebulizers.  Continue steroids.  The patient is still requiring supplemental 
oxygen.  Usually, he is only on oxygen as needed at home.

3.   Nicotine dependence with cigarette smoking, continuous.  Counseled.  The 
patient understands he is not allowed to go smoking downstairs.

4.   Deep vein thrombosis prophylaxis with Lovenox.

5.   Steroid-induced hyperglycemia.  We will continue to monitor. 



Plan:  Continue antibiotics.  Likely discharge in the next 24 to 48 hours 
depending on clinical response.  Wean off O2.  Currently still hypoxic at 87% 
on room air.





SA/MODL

DD:  06/16/2019 11:15:22   Voice ID:  [id number]

DT:  06/16/2019 16:34:21   Report ID:  [id number]

[facility]

## 2019-06-17 LAB
ALBUMIN SERPL BCP-MCNC: 2.8 G/DL (ref 3.4–5)
ALP SERPL-CCNC: 58 U/L (ref 45–117)
ALT SERPL W P-5'-P-CCNC: 17 U/L (ref 12–78)
AST SERPL W P-5'-P-CCNC: 9 U/L (ref 15–37)
BLD SMEAR INTERP: (no result)
BUN BLD-MCNC: 16 MG/DL (ref 7–18)
GLUCOSE SERPLBLD-MCNC: 131 MG/DL (ref 74–106)
HCT VFR BLD CALC: 39.1 % (ref 39.6–49)
LYMPHOCYTES # SPEC AUTO: 0.8 K/UL (ref 0.7–4.9)
MORPHOLOGY BLD-IMP: (no result)
PMV BLD: 9.1 FL (ref 7.6–11.3)
POTASSIUM SERPL-SCNC: 4.1 MMOL/L (ref 3.5–5.1)
RBC # BLD: 4.35 M/UL (ref 4.33–5.43)

## 2019-06-17 RX ADMIN — CEFEPIME SCH MLS: 1 INJECTION, POWDER, FOR SOLUTION INTRAMUSCULAR; INTRAVENOUS at 09:11

## 2019-06-17 RX ADMIN — ARFORMOTEROL TARTRATE SCH MCG: 15 SOLUTION RESPIRATORY (INHALATION) at 08:05

## 2019-06-17 RX ADMIN — IPRATROPIUM BROMIDE SCH: 0.5 SOLUTION RESPIRATORY (INHALATION) at 02:00

## 2019-06-17 RX ADMIN — Medication SCH ML: at 09:12

## 2019-06-17 RX ADMIN — DOXYCYCLINE SCH MG: 100 CAPSULE ORAL at 09:12

## 2019-06-17 RX ADMIN — ENOXAPARIN SODIUM SCH: 40 INJECTION SUBCUTANEOUS at 09:00

## 2019-06-17 RX ADMIN — NICOTINE SCH MG: 21 PATCH TRANSDERMAL at 09:12

## 2019-06-17 RX ADMIN — IPRATROPIUM BROMIDE SCH MG: 0.5 SOLUTION RESPIRATORY (INHALATION) at 08:05

## 2019-06-17 NOTE — RAD REPORT
EXAM DESCRIPTION:  Lalat Pa And Lat (2 Views)6/17/2019 8:36 am

 

CLINICAL HISTORY:  Cough

 

COMPARISON:  Kendy 15, 2019

 

FINDINGS:   Hyperaerated lungs

 

Diffuse bilateral pulmonary opacities mildly improved.

 

Heart is normal size

 

IMPRESSION:  COPD

 

Mild improvement in diffuse bilateral pulmonary opacities. I suspect that most of this represents chr
onic changes.

## 2019-06-17 NOTE — RAD REPORT
EXAM DESCRIPTION:  Chest Single View

 

CLINICAL HISTORY:  1 years Male, Cough;COPD;Congestion

 

COMPARISON:  None

 

FINDINGS:  Hyperinflation and prominence of the interstitium.

No pleural effusion. No pneumothorax.

Cardiac silhouette is within normal limits.

No acute osseous abnormality.

 

IMPRESSION:  Chronic lung changes. Superimposed multifocal pneumonia cannot be excluded. Correlate fo
r signs of infection.

 

Electronically signed by:   Declan Pope DO   6/15/2019 2:10 AM CDT Workstation: [id number]

 

 

 

Due to temporary technical issues with the PACS/Fluency reporting system, reports are being signed by
 the in house radiologist as a courtesy to ensure prompt reporting. The interpreting radiologist is f
ully responsible for the content of the report.

## 2019-06-18 NOTE — DS
Date of Discharge:  06/17/2019



Consultants:  Dr. Ha with Pulmonology.



Admitting Diagnoses:  

1.Acute chronic obstructive pulmonary disease exacerbation.

2.Multifocal pneumonia, failed outpatient treatment.

3.Nicotine dependence with cigarette smoking.



Discharge Diagnoses:  

1.Acute chronic obstructive pulmonary disease exacerbation, improving.

2.Multifocal pneumonia with failed outpatient treatment.  Cultures negative.

3.Nicotine dependence with cigarette smoking, continuous.

4.Steroid-induced hyperglycemia.



Hospital Course:  The patient is a 59-year-old male with COPD on oxygen as needed, who comes in with 
COPD exacerbation and multifocal pneumonia.  The patient had high fevers of 104, elevated WBC count o
f 22,000, had been treated with Levaquin for 14 days as an outpatient.  Imaging studies showed multif
ocal pneumonia.  The patient was seen by Dr. Ha.  He was started on IV antibiotics.  The aquiles
t is at risk for MRSA and Pseudomonas due to his condition and failure of treatment.  The patient had
 a repeat chest x-ray, which showed some improvement.  He was able to be weaned off oxygen.  The filomena
ent only uses oxygen as needed at home.  He was able to ambulate without significant dyspnea.  He was
 feeling better.  His sputum production had decreased.  His sputum is now clear, not thick/mucousy, a
nd not green.  The patient's white count is still elevated, partly due to steroids.  No other signs o
f sepsis.  The patient was counseled regarding tobacco cessation.  The patient was then cleared for d
ischarge from pulmonology standpoint, sent home in a stable condition.



Activity:  No strenuous activity.



Discharge Instructions:  Follow up with primary care physician in 2-3 days.  Follow up with pulmonolo
gist, Dr. Ha, in 1 week.  CBC and chest x-ray prior to pulmonologist appointment.  Return to ER
 for worsening condition.



Diet:  Regular.



Medications:  As per medication reconciliation list.



Physical Examination:

General:  Awake, alert, oriented x3, not in any acute distress.  Appears older than stated age. 

CV:  S1, S2. 

Respiratory:  Moving air well bilaterally.  Minimal wheezing. 

Gastrointestinal:  Abdomen is soft, nontender, nondistended.  Positive bowel sounds. 

Extremities:  No clubbing, cyanosis, or edema. 

Neurologic:  Nonfocal.



Time Spent:  Total time spent discharging the patient was 37 minutes.





SA/MODL

DD:  06/17/2019 13:56:06Voice ID:  [id number]

DT:  06/18/2019 04:01:32Report ID:  [id number]

## 2019-09-16 ENCOUNTER — HOSPITAL ENCOUNTER (OUTPATIENT)
Dept: HOSPITAL 97 - OR | Age: 59
Discharge: HOME | End: 2019-09-16
Attending: INTERNAL MEDICINE
Payer: COMMERCIAL

## 2019-09-16 VITALS — OXYGEN SATURATION: 93 % | DIASTOLIC BLOOD PRESSURE: 61 MMHG | TEMPERATURE: 98.4 F | SYSTOLIC BLOOD PRESSURE: 115 MMHG

## 2019-09-16 DIAGNOSIS — B96.81: ICD-10-CM

## 2019-09-16 DIAGNOSIS — Z99.81: ICD-10-CM

## 2019-09-16 DIAGNOSIS — K22.2: Primary | ICD-10-CM

## 2019-09-16 DIAGNOSIS — K29.50: ICD-10-CM

## 2019-09-16 DIAGNOSIS — K44.9: ICD-10-CM

## 2019-09-16 DIAGNOSIS — J44.9: ICD-10-CM

## 2019-09-16 PROCEDURE — 88312 SPECIAL STAINS GROUP 1: CPT

## 2019-09-16 PROCEDURE — 43249 ESOPH EGD DILATION <30 MM: CPT

## 2019-09-16 PROCEDURE — 43239 EGD BIOPSY SINGLE/MULTIPLE: CPT

## 2019-09-16 PROCEDURE — 88305 TISSUE EXAM BY PATHOLOGIST: CPT

## 2019-09-16 PROCEDURE — 0DB68ZX EXCISION OF STOMACH, VIA NATURAL OR ARTIFICIAL OPENING ENDOSCOPIC, DIAGNOSTIC: ICD-10-PCS

## 2019-09-16 PROCEDURE — 0D738ZZ DILATION OF LOWER ESOPHAGUS, VIA NATURAL OR ARTIFICIAL OPENING ENDOSCOPIC: ICD-10-PCS

## 2019-09-17 NOTE — OP
Surgeon:  Derek Dietrich MD



Procedure To Be Performed:  Esophagogastroduodenoscopy.



Indication For Procedure:  Dysphagia, epigastric pain.



Plan For Anesthesia:  Monitored anesthesia care.



Complexity:  Average.



Technique:  After obtaining informed consent from the patient, explaining risks and complications, wh
ich include but not limited to bleeding, infection, perforation, and anesthesia complication.  Patien
t was placed in left lateral position and sedation was given.  From then on, the scope was advanced t
hrough the mouth and carefully guided up till the second portion of the duodenum.  After the completi
on of examination, all diagnostic and therapeutic maneuvers, scope and equipment were withdrawn and p
rocedure terminated in a safe manner.



Findings:  

1.Esophagus:  No gross lesion seen in the upper and mid esophagus.  In the distal esophagus, a small
 hiatal hernia seen.  Also seen was mild-to-moderate stricture.  This was dilated through the scope b
alloon up from 12 to 13.5 mm.  The dilation was successful.  There appeared to be mild improvement in
 the stricture.

2.Stomach:  Mild patchy erythema seen in the body and antrum.  Biopsies were taken.

3.Duodenum:  The bulb and second portion appeared normal.



Complications:  None.



Tolerance To Anesthesia:  Excellent.



Postoperative Diagnoses:  Esophageal stenosis status post dilation, hiatal hernia, gastritis.



Plan:  

1.Await pathology results.

2.Continue oral PPI.

3.Follow up in the GI clinic in 2 weeks.

4.If needed can dilate to 15 mm as well in 6 to 8 weeks.





US/MODL

DD:  09/16/2019 13:06:55Voice ID:  [id number]

DT:  09/17/2019 00:37:42Report ID:  [id number]

## 2020-10-01 ENCOUNTER — HOSPITAL ENCOUNTER (OUTPATIENT)
Dept: HOSPITAL 97 - ER | Age: 60
Setting detail: OBSERVATION
LOS: 2 days | Discharge: HOME | End: 2020-10-03
Payer: COMMERCIAL

## 2020-10-01 VITALS — BODY MASS INDEX: 23.6 KG/M2

## 2020-10-01 DIAGNOSIS — I45.2: ICD-10-CM

## 2020-10-01 DIAGNOSIS — Z99.81: ICD-10-CM

## 2020-10-01 DIAGNOSIS — K22.2: ICD-10-CM

## 2020-10-01 DIAGNOSIS — Z87.891: ICD-10-CM

## 2020-10-01 DIAGNOSIS — Z79.52: ICD-10-CM

## 2020-10-01 DIAGNOSIS — J44.1: Primary | ICD-10-CM

## 2020-10-01 DIAGNOSIS — R94.31: ICD-10-CM

## 2020-10-01 LAB
ALBUMIN SERPL BCP-MCNC: 3.5 G/DL (ref 3.4–5)
ALP SERPL-CCNC: 69 U/L (ref 45–117)
ALT SERPL W P-5'-P-CCNC: 19 U/L (ref 12–78)
AST SERPL W P-5'-P-CCNC: 14 U/L (ref 15–37)
BUN BLD-MCNC: 12 MG/DL (ref 7–18)
GLUCOSE SERPLBLD-MCNC: 93 MG/DL (ref 74–106)
HCT VFR BLD CALC: 43.4 % (ref 39.6–49)
INR BLD: 0.98
LYMPHOCYTES # SPEC AUTO: 2 K/UL (ref 0.7–4.9)
MAGNESIUM SERPL-MCNC: 2.2 MG/DL (ref 1.8–2.4)
NT-PROBNP SERPL-MCNC: 30 PG/ML (ref ?–125)
PMV BLD: 8.8 FL (ref 7.6–11.3)
POTASSIUM SERPL-SCNC: 3.9 MMOL/L (ref 3.5–5.1)
RBC # BLD: 4.87 M/UL (ref 4.33–5.43)
TROPONIN (EMERG DEPT USE ONLY): < 0.02 NG/ML (ref 0–0.04)

## 2020-10-01 PROCEDURE — 85025 COMPLETE CBC W/AUTO DIFF WBC: CPT

## 2020-10-01 PROCEDURE — 36415 COLL VENOUS BLD VENIPUNCTURE: CPT

## 2020-10-01 PROCEDURE — 80076 HEPATIC FUNCTION PANEL: CPT

## 2020-10-01 PROCEDURE — 80048 BASIC METABOLIC PNL TOTAL CA: CPT

## 2020-10-01 PROCEDURE — 83735 ASSAY OF MAGNESIUM: CPT

## 2020-10-01 PROCEDURE — 83880 ASSAY OF NATRIURETIC PEPTIDE: CPT

## 2020-10-01 PROCEDURE — 96365 THER/PROPH/DIAG IV INF INIT: CPT

## 2020-10-01 PROCEDURE — 71045 X-RAY EXAM CHEST 1 VIEW: CPT

## 2020-10-01 PROCEDURE — 96375 TX/PRO/DX INJ NEW DRUG ADDON: CPT

## 2020-10-01 PROCEDURE — 84484 ASSAY OF TROPONIN QUANT: CPT

## 2020-10-01 PROCEDURE — 93005 ELECTROCARDIOGRAM TRACING: CPT

## 2020-10-01 PROCEDURE — 99285 EMERGENCY DEPT VISIT HI MDM: CPT

## 2020-10-01 PROCEDURE — 87070 CULTURE OTHR SPECIMN AEROBIC: CPT

## 2020-10-01 PROCEDURE — 94640 AIRWAY INHALATION TREATMENT: CPT

## 2020-10-01 PROCEDURE — 87077 CULTURE AEROBIC IDENTIFY: CPT

## 2020-10-01 PROCEDURE — 87205 SMEAR GRAM STAIN: CPT

## 2020-10-01 PROCEDURE — 87186 SC STD MICRODIL/AGAR DIL: CPT

## 2020-10-01 PROCEDURE — 85610 PROTHROMBIN TIME: CPT

## 2020-10-01 PROCEDURE — 87040 BLOOD CULTURE FOR BACTERIA: CPT

## 2020-10-01 NOTE — ER
Nurse's Notes                                                                                     

 [facility]                                                                 

Name: Jaxon Taylor                                                                             

Age: 60 yrs                                                                                       

Sex: Male                                                                                         

: 1960                                                                                   

MRN: [id number]                                                                                   

Arrival Date: 10/01/2020                                                                          

Time: 20:29                                                                                       

Account#: [id number]                                                                            

Bed 4                                                                                             

Private MD:                                                                                       

Diagnosis: Chronic obstructive pulmonary disease with (acute) exacerbation                        

                                                                                                  

Presentation:                                                                                     

10/01                                                                                             

20:43 Chief complaint: Patient states: SOB, cough, leg swelling, fever for 1 month. Fever up  ll1 

      to 102 at home. Corona negative last month. Coronavirus screen: Client denies travel        

      out of the [place] in the last 14 days. cough unrelated to allergies, difficulty               

      breathing, shortness of breath, Client presents with at least one sign or symptom that      

      may indicate coronavirus-19. Standard/surgical mask placed on the client. Ebola Screen:     

      Patient denies travel to an Ebola-affected area in the 21 days before illness onset.        

      Initial Sepsis Screen: Does the patient meet any 2 criteria? HR > 90 bpm. No. Patient's     

      initial sepsis screen is negative. Risk Assessment: Do you want to hurt yourself or         

      someone else? Patient reports no desire to harm self or others. Onset of symptoms was       

      2020.                                                                          

20:43 Method Of Arrival: Ambulatory                                                           ll1 

20:43 Acuity: FROILAN 2                                                                           ll1 

22:21 Initial Sepsis Screen: Does the patient have a suspected source of infection? Yes:      rv  

      Productive cough/pneumonia.                                                                 

                                                                                                  

Triage Assessment:                                                                                

21:00 Respiratory: Reports shortness of breath at rest Onset: The symptoms/episode            rv  

      began/occurred suddenly, the patient has moderate shortness of breath.                      

21:00 General: Appears uncomfortable, ill, Behavior is calm, cooperative.                     rv  

                                                                                                  

Historical:                                                                                       

- Allergies:                                                                                      

20:46 No Known Allergies;                                                                     ll1 

- PMHx:                                                                                           

20:46 COPD; Emphysema;                                                                        ll1 

- PSHx:                                                                                           

20:46 Mastoidectomy; Appendectomy;                                                            ll1 

                                                                                                  

- Immunization history:: Flu vaccine is up to date.                                               

- Social history:: Smoking status: Patient/guardian denies using tobacco, the patient             

  reports quitting approximately 4 years ago.                                                     

                                                                                                  

                                                                                                  

Screenin:38 Abuse screen: Denies threats or abuse. Nutritional screening: No deficits noted.        ea  

      Tuberculosis screening: No symptoms or risk factors identified. Fall Risk IV access (20     

      points).                                                                                    

                                                                                                  

Assessment:                                                                                       

21:00 General: Appears comfortable, Behavior is calm, cooperative.                            rv  

21:00 Pain: Denies pain. Neuro: Level of Consciousness is awake, alert, obeys commands,       rv  

      Oriented to person, place, time, situation. Cardiovascular: Patient's skin is warm and      

      dry. Rhythm is sinus rhythm. Respiratory: Airway is patent Respiratory effort is            

      labored, Respiratory pattern is tachypnea Breath sounds with wheezes bilaterally. Derm:     

      Skin is intact.                                                                             

                                                                                                  

Vital Signs:                                                                                      

20:43  / 74; Pulse 102; Resp 26; Temp 98.6; Pulse Ox 98% on 6 lpm NC; Weight 74.84 kg;  ll1 

      Height 5 ft. 11 in. (180.34 cm); Pain 5/10;                                                 

21:57  / 96; Pulse 95; Resp 26; Pulse Ox 97% ;                                          ea  

22:58  / 67; Pulse 92; Resp 28; Pulse Ox 95% ;                                          ea  

23:30  / 72; Pulse 96; Resp 25; Pulse Ox 98% on 4 lpm NC;                               ea  

10/02                                                                                             

00:00  / 64; Pulse 98; Resp 23; Pulse Ox 98% on 4 lpm NC;                               ea  

01:06  / 66; Pulse 92; Resp 23; Pulse Ox 97% on 4 lpm NC;                               ea  

10/01                                                                                             

20:43 Body Mass Index 23.01 (74.84 kg, 180.34 cm)                                             ll1 

                                                                                                  

ED Course:                                                                                        

10/01                                                                                             

20:29 Patient arrived in ED.                                                                  cl3 

20:45 Triage completed.                                                                       ll1 

20:46 Arm band placed on Patient placed in an exam room, on a stretcher.                      ll1 

20:57 Fred Alvares PA is PHCP.                                                               jr8 

20:57 Juan Rene MD is Attending Physician.                                            jr8 

21:07 Initial lab(s) drawn, by me, sent to lab. First set of blood cultures drawn by me.      rv  

21:11 Mario Mendez RN is Primary Nurse.                                                  rv  

21:12 Inserted saline lock: 20 gauge in right forearm, using aseptic technique. Blood         rv  

      collected.                                                                                  

21:38 Patient has correct armband on for positive identification. Bed in low position. Call   ea  

      light in reach. Cardiac monitor on. Pulse ox on. NIBP on.                                   

22:01 XRAY Chest (1 view) In Process Unspecified.                                             EDMS

23:10 Devon Mccann MD is Hospitalizing Provider.                                           jr8 

10/02                                                                                             

00:24 SARS-COV-2 RT PCR Sent.                                                                 ll2 

00:24 COVID-19 Sent.                                                                          ll2 

01:09 No provider procedures requiring assistance completed. Patient admitted, IV remains in  ea  

      place.                                                                                      

                                                                                                  

Administered Medications:                                                                         

10/01                                                                                             

21:11 Drug: Albuterol - atroVENT (3:1) (2.5 mg - 0.5 mg) 3 ml Route: Nebulizer;               rv  

22:20 Follow up: Response: No adverse reaction; Wheezing unchanged                            rv  

21:11 Drug: SOLU-Medrol 125 mg Route: IVP; Site: right forearm;                               rv  

22:20 Follow up: Response: No adverse reaction                                                rv  

22:20 Drug: Albuterol 2.5 mg Route: Inhalation;                                               rv  

22:40 Drug: Albuterol 2.5 mg Route: Inhalation;                                               rv  

22:50 Drug: Albuterol 2.5 mg Route: Inhalation;                                               rv  

22:51 Follow up: Response: No adverse reaction; Wheezing unchanged                            rv  

23:22 Drug: LevaQUIN 500 mg Volume: 100 ml; Route: IVPB; Infused Over: 60 mins; Site: right   rv  

      forearm;                                                                                    

10/02                                                                                             

00:23 Follow up: Response: No adverse reaction; IV Status: Completed infusion                 ll2 

                                                                                                  

                                                                                                  

Outcome:                                                                                          

10/01                                                                                             

23:13 Decision to Hospitalize by Provider.                                                    jr8 

10/02                                                                                             

01:09 Condition: stable                                                                       ea  

      Instructed on the need for admit, Demonstrated understanding of instructions.               

01:15 Admitted to Med/surg accompanied by tech, via wheelchair, room 207, with chart, Report  rv  

      called to  jayde victor                                                                       

01:20 Patient left the ED.                                                                    rv  

                                                                                                  

Signatures:                                                                                       

Dispatcher [employer]                           EDMS                                                 

Fred Alvares PA PA   jr8                                                  

Kirsten Starks RN                      Mario Crenshaw ea RN                    RN   Barbie Rollins3                                                  

Salina Darden RN                    RN   ll2                                                  

Nehemiah Monae RN                       RN   ll1                                                  

                                                                                                  

Corrections: (The following items were deleted from the chart)                                    

01:09 01:06  / 66; Pulse 92bpm; Resp 25bpm; Pulse Ox 97% 4 lpm Nasal Cannula; renee duran  

                                                                                                  

**************************************************************************************************

## 2020-10-01 NOTE — EDPHYS
Physician Documentation                                                                           

 [facility]                                                                 

Name: Jaxon Taylor                                                                             

Age: 60 yrs                                                                                       

Sex: Male                                                                                         

: 1960                                                                                   

MRN: [id number]                                                                                   

Arrival Date: 10/01/2020                                                                          

Time: 20:29                                                                                       

Account#: [id number]                                                                            

Bed 4                                                                                             

Private MD:                                                                                       

ED Physician Juan Rene                                                                     

HPI:                                                                                              

10/01                                                                                             

21:14 This 60 yrs old  Male presents to ER via Ambulatory with complaints of         jr8 

      Shortness Of Breath.                                                                        

21:14 The patient has shortness of breath at rest. Onset: The symptoms/episode began/occurred jr8 

      gradually, 1 month(s) ago, and became worse and became persistent. Duration: The            

      symptoms are continuous. The patient's shortness of breath is aggravated by light           

      activity, talking, walking. Associated signs and symptoms: The patient has no apparent      

      associated signs or symptoms. Severity of symptoms: At their worst the symptoms were        

      moderate in the emergency department the symptoms are unchanged. The patient has not        

      experienced similar symptoms in the past. The patient has not recently seen a physician.    

                                                                                                  

Historical:                                                                                       

- Allergies:                                                                                      

20:46 No Known Allergies;                                                                     ll1 

- PMHx:                                                                                           

20:46 COPD; Emphysema;                                                                        ll1 

- PSHx:                                                                                           

20:46 Mastoidectomy; Appendectomy;                                                            ll1 

                                                                                                  

- Immunization history:: Flu vaccine is up to date.                                               

- Social history:: Smoking status: Patient/guardian denies using tobacco, the patient             

  reports quitting approximately 4 years ago.                                                     

                                                                                                  

                                                                                                  

ROS:                                                                                              

21:14 Eyes: Negative for injury, pain, redness, and discharge, ENT: Negative for injury,      jr8 

      pain, and discharge, Neck: Negative for injury, pain, and swelling, Cardiovascular:         

      Negative for chest pain, palpitations, and edema, Abdomen/GI: Negative for abdominal        

      pain, nausea, vomiting, diarrhea, and constipation, Back: Negative for injury and pain,     

      MS/Extremity: Negative for injury and deformity, Skin: Negative for injury, rash, and       

      discoloration, Neuro: Negative for headache, weakness, numbness, tingling, and seizure.     

21:14 Respiratory: Positive for dyspnea on exertion, shortness of breath, wheezing,               

      expiratory.                                                                                 

                                                                                                  

Exam:                                                                                             

21:14 Eyes:  Pupils equal round and reactive to light, extra-ocular motions intact.  Lids and jr8 

      lashes normal.  Conjunctiva and sclera are non-icteric and not injected.  Cornea within     

      normal limits.  Periorbital areas with no swelling, redness, or edema. ENT:  Nares          

      patent. No nasal discharge, no septal abnormalities noted.  Tympanic membranes are          

      normal and external auditory canals are clear.  Oropharynx with no redness, swelling,       

      or masses, exudates, or evidence of obstruction, uvula midline.  Mucous membranes           

      moist. Neck:  Trachea midline, no thyromegaly or masses palpated, and no cervical           

      lymphadenopathy.  Supple, full range of motion without nuchal rigidity, or vertebral        

      point tenderness.  No Meningismus. Abdomen/GI:  Soft, non-tender, with normal bowel         

      sounds.  No distension or tympany.  No guarding or rebound.  No evidence of tenderness      

      throughout. Back:  No spinal tenderness.  No costovertebral tenderness.  Full range of      

      motion. Skin:  Warm, dry with normal turgor.  Normal color with no rashes, no lesions,      

      and no evidence of cellulitis. MS/ Extremity:  Pulses equal, no cyanosis.                   

      Neurovascular intact.  Full, normal range of motion. Neuro:  Awake and alert, GCS 15,       

      oriented to person, place, time, and situation.  Cranial nerves II-XII grossly intact.      

      Motor strength 5/5 in all extremities.  Sensory grossly intact.  Cerebellar exam            

      normal.  Normal gait.                                                                       

21:14 Respiratory: moderate respiratory distress is noted,  Respirations: labored breathing,      

      accessory muscle usage, that is mild, tachypnea, Breath sounds: decreased breath            

      sounds, that are mild, are located in both bases, wheezing: expiratory that is              

      moderate, is heard diffusely.                                                               

                                                                                                  

Vital Signs:                                                                                      

20:43  / 74; Pulse 102; Resp 26; Temp 98.6; Pulse Ox 98% on 6 lpm NC; Weight 74.84 kg;  ll1 

      Height 5 ft. 11 in. (180.34 cm); Pain 5/10;                                                 

21:57  / 96; Pulse 95; Resp 26; Pulse Ox 97% ;                                          ea  

22:58  / 67; Pulse 92; Resp 28; Pulse Ox 95% ;                                          ea  

23:30  / 72; Pulse 96; Resp 25; Pulse Ox 98% on 4 lpm NC;                               ea  

10/02                                                                                             

00:00  / 64; Pulse 98; Resp 23; Pulse Ox 98% on 4 lpm NC;                               ea  

01:06  / 66; Pulse 92; Resp 23; Pulse Ox 97% on 4 lpm NC;                               ea  

10/01                                                                                             

20:43 Body Mass Index 23.01 (74.84 kg, 180.34 cm)                                             ll1 

                                                                                                  

MDM:                                                                                              

10/01                                                                                             

20:57 Patient medically screened.                                                             8 

23:08 Data reviewed: vital signs, nurses notes, lab test result(s), EKG, radiologic studies,  jr8 

      plain films. Data interpreted: Pulse oximetry: on room air is 95 %. Interpretation:         

      normal. Counseling: I had a detailed discussion with the patient and/or guardian            

      regarding: the historical points, exam findings, and any diagnostic results supporting      

      the discharge/admit diagnosis, lab results, radiology results, the need for further         

      work-up and treatment in the hospital. ED course: Mild improvement post treatments but      

      still tachyneac with wheezing .                                                             

                                                                                                  

10/01                                                                                             

20:57 Order name: Basic Metabolic Panel; Complete Time: 21:57                                 [facility] 

10/01                                                                                             

20:57 Order name: CBC with Diff; Complete Time: 21:33                                         8 

10/01                                                                                             

20:57 Order name: LFT's; Complete Time: 21:57                                                 [facility] 

10/01                                                                                             

20:57 Order name: Magnesium; Complete Time: 21:57                                             [facility] 

10/01                                                                                             

20:57 Order name: NT PRO-BNP; Complete Time: 21:57                                            [facility] 

10/01                                                                                             

20:57 Order name: PT-INR; Complete Time: 21:33                                                [facility] 

10/01                                                                                             

20:57 Order name: Troponin (emerg Dept Use Only); Complete Time: 21:57                        [facility] 

10/01                                                                                             

20:57 Order name: XRAY Chest (1 view); Complete Time: 18:33                                   [facility] 

10/01                                                                                             

23:13 Order name: Blood Culture Adult (2)                                                     [facility] 

10/01                                                                                             

23:41 Order name: COVID-19                                                                    [facility] 

10/02                                                                                             

00:05 Order name: SARS-COV-2 RT PCR; Complete Time: 18:33                                     [facility]

10/01                                                                                             

20:57 Order name: EKG; Complete Time: 20:58                                                   [facility] 

10/01                                                                                             

20:57 Order name: Cardiac monitoring; Complete Time: 21:44                                    [facility] 

10/01                                                                                             

20:57 Order name: EKG - Nurse/Tech; Complete Time: 21:44                                      [facility] 

10/01                                                                                             

20:57 Order name: IV Saline Lock; Complete Time: 21:44                                        8 

10/01                                                                                             

20:57 Order name: Labs collected and sent; Complete Time: 21:44                               [facility] 

10/01                                                                                             

20:57 Order name: O2 Per Protocol; Complete Time: 21:44                                       jr8 

10/01                                                                                             

20:57 Order name: O2 Sat Monitoring; Complete Time: 21:45                                     jr8 

                                                                                                  

Administered Medications:                                                                         

21:11 Drug: Albuterol - atroVENT (3:1) (2.5 mg - 0.5 mg) 3 ml Route: Nebulizer;               rv  

22:20 Follow up: Response: No adverse reaction; Wheezing unchanged                            rv  

21:11 Drug: SOLU-Medrol 125 mg Route: IVP; Site: right forearm;                               rv  

22:20 Follow up: Response: No adverse reaction                                                rv  

22:20 Drug: Albuterol 2.5 mg Route: Inhalation;                                               rv  

22:40 Drug: Albuterol 2.5 mg Route: Inhalation;                                               rv  

22:50 Drug: Albuterol 2.5 mg Route: Inhalation;                                               rv  

22:51 Follow up: Response: No adverse reaction; Wheezing unchanged                            rv  

23:22 Drug: LevaQUIN 500 mg Volume: 100 ml; Route: IVPB; Infused Over: 60 mins; Site: right   rv  

      forearm;                                                                                    

10/02                                                                                             

00:23 Follow up: Response: No adverse reaction; IV Status: Completed infusion                 ll2 

                                                                                                  

                                                                                                  

Disposition:                                                                                      

07:07 Co-signature as Attending Physician, Juan Rene MD I agree with the assessment and tw4 

      plan of care.                                                                               

                                                                                                  

Disposition:                                                                                      

10/01/20 23:13 Hospitalization ordered by Devon Mccann for Observation. Preliminary             

  diagnosis is Chronic obstructive pulmonary disease with (acute) exacerbation.                   

- Bed requested for Telemetry/MedSurg (observation).                                              

- Status is Observation.                                                                      rv  

- Condition is Stable.                                                                            

- Problem is new.                                                                                 

- Symptoms have improved.                                                                         

                                                                                                  

                                                                                                  

                                                                                                  

Signatures:                                                                                       

Dispatcher [employer]                           EDMS                                                 

Fred Alvares PA PA   jr8                                                  

Elana Velarde, RN                       JAVI                                                      

Juan Rene MD MD   tw4                                                  

Mario Mendez RN RN   rv                                                   

Nehemiah Monae RN                       RN   ll1                                                  

Salina Darden RN   ll2                                                  

                                                                                                  

Corrections: (The following items were deleted from the chart)                                    

00:05 10/01 23:42 CORONAVIRUS ordered. [facility]

10/02                                                                                             

00:28 10/01 23:13 Hospitalization Ordered by Devon Mccann MD for Observation. Preliminary    cg  

      diagnosis is Chronic obstructive pulmonary disease with (acute) exacerbation. Bed           

      requested for Telemetry/MedSurg (observation). Status is Observation. Condition is          

      Stable. Problem is new. Symptoms have improved. jr8                                         

10/02                                                                                             

01:20 00:28 10/01/2020 23:13 Hospitalization Ordered by Devon Mccann MD for Observation.     rv  

      Preliminary diagnosis is Chronic obstructive pulmonary disease with (acute)                 

      exacerbation. Bed requested for Telemetry/MedSurg (observation). Status is Observation.     

      Condition is Stable. Problem is new. Symptoms have improved. cg                             

                                                                                                  

**************************************************************************************************

## 2020-10-02 LAB
BLD SMEAR INTERP: (no result)
BUN BLD-MCNC: 15 MG/DL (ref 7–18)
GLUCOSE SERPLBLD-MCNC: 160 MG/DL (ref 74–106)
HCT VFR BLD CALC: 41 % (ref 39.6–49)
LYMPHOCYTES # SPEC AUTO: 0.5 K/UL (ref 0.7–4.9)
MORPHOLOGY BLD-IMP: (no result)
PMV BLD: 8.6 FL (ref 7.6–11.3)
POTASSIUM SERPL-SCNC: 4.3 MMOL/L (ref 3.5–5.1)
RBC # BLD: 4.6 M/UL (ref 4.33–5.43)

## 2020-10-02 RX ADMIN — DOXYCYCLINE SCH MG: 100 CAPSULE ORAL at 11:04

## 2020-10-02 RX ADMIN — IPRATROPIUM BROMIDE SCH MG: 0.5 SOLUTION RESPIRATORY (INHALATION) at 13:00

## 2020-10-02 RX ADMIN — METHYLPREDNISOLONE SODIUM SUCCINATE SCH MG: 40 INJECTION, POWDER, FOR SOLUTION INTRAMUSCULAR; INTRAVENOUS at 16:32

## 2020-10-02 RX ADMIN — ALBUTEROL SULFATE SCH: 2.5 SOLUTION RESPIRATORY (INHALATION) at 20:00

## 2020-10-02 RX ADMIN — CEFEPIME SCH MLS: 1 INJECTION, POWDER, FOR SOLUTION INTRAMUSCULAR; INTRAVENOUS at 21:34

## 2020-10-02 RX ADMIN — FUROSEMIDE SCH MG: 20 TABLET ORAL at 09:31

## 2020-10-02 RX ADMIN — ENOXAPARIN SODIUM SCH: 40 INJECTION SUBCUTANEOUS at 09:00

## 2020-10-02 RX ADMIN — METHYLPREDNISOLONE SODIUM SUCCINATE SCH MG: 40 INJECTION, POWDER, FOR SOLUTION INTRAMUSCULAR; INTRAVENOUS at 11:01

## 2020-10-02 RX ADMIN — IPRATROPIUM BROMIDE SCH MG: 0.5 SOLUTION RESPIRATORY (INHALATION) at 08:00

## 2020-10-02 RX ADMIN — SPIRONOLACTONE SCH MG: 25 TABLET, FILM COATED ORAL at 09:29

## 2020-10-02 RX ADMIN — MOMETASONE FUROATE AND FORMOTEROL FUMARATE DIHYDRATE SCH PUFF: 100; 5 AEROSOL RESPIRATORY (INHALATION) at 21:34

## 2020-10-02 RX ADMIN — IPRATROPIUM BROMIDE SCH: 0.5 SOLUTION RESPIRATORY (INHALATION) at 20:00

## 2020-10-02 RX ADMIN — DOXYCYCLINE SCH MG: 100 CAPSULE ORAL at 21:34

## 2020-10-02 RX ADMIN — Medication SCH ML: at 21:35

## 2020-10-02 RX ADMIN — MOMETASONE FUROATE AND FORMOTEROL FUMARATE DIHYDRATE SCH PUFF: 100; 5 AEROSOL RESPIRATORY (INHALATION) at 09:00

## 2020-10-02 RX ADMIN — CEFEPIME SCH MLS: 1 INJECTION, POWDER, FOR SOLUTION INTRAMUSCULAR; INTRAVENOUS at 11:00

## 2020-10-02 RX ADMIN — IPRATROPIUM BROMIDE SCH: 0.5 SOLUTION RESPIRATORY (INHALATION) at 01:48

## 2020-10-02 RX ADMIN — ALBUTEROL SULFATE SCH: 2.5 SOLUTION RESPIRATORY (INHALATION) at 01:48

## 2020-10-02 RX ADMIN — ALBUTEROL SULFATE SCH MG: 2.5 SOLUTION RESPIRATORY (INHALATION) at 13:00

## 2020-10-02 RX ADMIN — Medication SCH ML: at 09:00

## 2020-10-02 RX ADMIN — ALBUTEROL SULFATE SCH MG: 2.5 SOLUTION RESPIRATORY (INHALATION) at 08:00

## 2020-10-02 RX ADMIN — MOMETASONE FUROATE AND FORMOTEROL FUMARATE DIHYDRATE SCH: 100; 5 AEROSOL RESPIRATORY (INHALATION) at 09:00

## 2020-10-02 NOTE — P.CNS
Date of Consult: 10/02/20


Primary Care Provider: Dr. Samuels, Dr. Ha


Chief Complaint: COPD exacerbation


History of Present Illness: 





Patient is 60 years of age she has terminal COPD well known to me recurrent 

exacerbations has been sick for about a month got worse to the past 2 weeks came

in with worsening shortness of breath productive burden sputum in compliant with

his medications at home history of recurrent Pseudomonas lung infections reason 

and nebulized antibiotic


Allergies





No Known Allergies Allergy (Verified 10/02/20 01:41)


   





Home Medications: 








Albuterol Neb [Proventil 0.083% Neb Soln] 1 unit IN TID 06/15/19 


Ipratropium Neb [Atrovent Neb] 0.2 mg IH TID 09/16/19 


Colistin (Colistimethate Na) [Colistimethate] 1 puff IH DAILY 10/02/20 


Fluticasone/Umeclidin/Vilanter [Trelegy Ellipta 100-62.5-25] 1 puff IH DAILY 

10/02/20 


Furosemide [Lasix] 20 mg PO DAILY 10/02/20 


Spironolactone/Hydrochlorothiazide 1 tab PO DAILY 10/02/20 


predniSONE [Deltasone*] 1 tab PO DAILY 10/02/20 








- Past Medical/Surgical History


Diabetic: No


-: COPD - emphysema on home oxygen and chronic steroids


-: History of tobacco abuse


-: esophageal stricture/dysphagia


-: mastoidectomy


-: appendectomy


Psychosocial/ Personal History: Patient lives at home with his wife and 2 

daughters and is currently working at [employer].





- Family History


  ** Mother


Medical History: Heart disease, Hypertension





  ** Father


Medical History: Heart disease, Hypertension, Stroke





- Social History


Smoking Status: Current every day smoker


Alcohol use: No


CD- Drugs: No


Caffeine use: Yes


Place of Residence: Home





Review of Systems


10-point ROS is otherwise unremarkable


General: Weakness


Respiratory: Cough, Shortness of Breath





Physical Examination














Temp Pulse Resp BP Pulse Ox


 


 98 F   87   21 H  102/59 L  94 


 


 10/02/20 08:00  10/02/20 09:31  10/02/20 08:00  10/02/20 09:31  10/02/20 08:00








General: Alert, Mild distress


Respiratory: Expiratory wheezes


Cardiovascular: No edema, Regular rate/rhythm, Normal S1 S2


Gastrointestinal: Normal bowel sounds, Soft and benign


Musculoskeletal: No clubbing, No swelling


Integumentary: No rashes, No breakdown


Laboratory Data (last 24 hrs)





10/01/20 21:07: PT 11.6, INR 0.98


10/01/20 21:07: WBC 13.4 H, Hgb 14.0, Hct 43.4, Plt Count 336


10/01/20 21:07: Sodium 141, Potassium 3.9, BUN 12, Creatinine 0.88, Glucose 93, 

Magnesium 2.2, Total Bilirubin 0.4, AST 14 L, ALT 19, Alkaline Phosphatase 69








- Problems


(1) COPD exacerbation


Current Visit: No   Status: Acute   


Plan: 


Patient is 60 years of age well known to me history of severe COPD recurrent 

Pseudomonas lung infection admitted with an exacerbation agree with IV 

antibiotics change to cefepime 2 await sputum cultures as and multiple courses 

of levofloxacin in the past he may have resistant organisms I also recommend 

treating him for Mr EUBANKS with p.o. doxycycline chest x-ray shows COPD changes no 

evidence of pneumonia and no evidence of sepsis vital signs are stable maximize 

bronchodilator therapy

## 2020-10-02 NOTE — P.HP
Certification for Inpatient


Patient admitted to: Observation


With expected LOS: <2 Midnights


Patient will require the following post-hospital care: None


Practitioner: I am a practitioner with admitting privileges, knowledge of 

patient current condition, hospital course, and medical plan of care.


Services: Services provided to patient in accordance with Admission requirements

found in Title 42 Section 412.3 of the Code of Federal Regulations





<Ricky Mcginnis - Last Filed: 10/01/20 23:59>





Patient History


Date of Service: 10/01/20


Primary Care Provider: Dr. Samuels, Dr. Ha


Reason for admission: COPD exacerbation


History of Present Illness: 





60-year-old  male with history of COPD/emphysema presents emergency 

department for progressive worsening of shortness of breath.  Patient reports he

has not felt well over the course of the last 1 month with increasing sputum 

production, purulent sputum, fevers, increasing shortness of breath.  Upon 

presentation to the emergency department patient was tachypneic and having 

supraclavicular retractions.  Patient given multiple rounds of nebulized bing

athing treatments, steroids, antibiotics in the emergency department and does 

feel mildly improved but still not doing well.  Patient does take prednisone 10 

mg daily chronically as well as having home O2 set up at 1.5-2 L per nasal 

cannula.  ED provider wishes to admit patient for further evaluation and 

management.





When I saw the patient in the emergency department he was awake, alert, orient 

x3.  Patient in mild respiratory distress, difficulty speaking in full 

sentences.  Patient is requiring oxygen per nasal cannula but he does have this 

at home.





- Past Medical/Surgical History


Diabetic: No


-: COPD - emphysema on home oxygen and chronic steroids


-: History of tobacco abuse


-: mastoidectomy


-: aapendectomy


Psychosocial/ Personal History: Patient lives at home with his wife and 2 

daughters and is currently working at [employer].





- Family History


Family History: Reviewed- Non-Contributory





- Social History


Smoking Status: Former smoker


Alcohol use: No


CD- Drugs: No


Caffeine use: Yes


Place of Residence: Home





<Ricky Mcginnis - Last Filed: 10/01/20 23:59>


Date of Service: 10/03/20





<Devon Mccann - Last Filed: 10/03/20 17:46>


Allergies





No Known Allergies Allergy (Verified 10/02/20 01:41)


   





Home Medications: 








Albuterol Neb [Proventil 0.083% Neb Soln] 1 unit IN TID 06/15/19 


Ipratropium Neb [Atrovent*] 0.2 mg IH TID 09/16/19 


Colistin (Colistimethate Na) [Colistimethate] 1 puff IH DAILY 10/02/20 


Fluticasone/Umeclidin/Vilanter [Trelegy Ellipta 100-62.5-25] 1 puff IH DAILY 

10/02/20 


Furosemide [Lasix*] 20 mg PO DAILY 10/02/20 


Spironolactone/Hydrochlorothiazide 1 tab PO DAILY 10/02/20 


predniSONE [Deltasone*] 1 tab PO DAILY 10/02/20 


levoFLOXacin [Levaquin] 500 mg PO DAILY 7 Days #7 tab 10/03/20 


predniSONE [Deltasone*] 10 mg PO SEECOM 14 Days #42 tab 10/03/20 








Review of Systems


Respiratory: Cough, Shortness of Breath, SOB with Excertion, Sputum (Increase in

 sputum production, purulent sputum)





<Ricky Mcginnis - Last Filed: 10/01/20 23:59>





Physical Examination





- Physical Exam


General: Alert, In no apparent distress


HEENT: Atraumatic, PERRLA, Mucous membr. moist/pink


Neck: Supple, 2+ carotid pulse no bruit, No LAD


Respiratory: Expiratory wheezes, Other (To give me a)


Cardiovascular: Regular rate/rhythm, Normal S1 S2


Capillary refill: <2 Seconds


Gastrointestinal: Normal bowel sounds, No tenderness


Musculoskeletal: No tenderness


Integumentary: No rashes


Neurological: Normal gait, Normal speech, Normal strength at 5/5 x4 extr, Normal

 tone, Normal affect





- Studies


Laboratory Data (last 24 hrs)





10/01/20 21:07: PT 11.6, INR 0.98


10/01/20 21:07: WBC 13.4 H, Hgb 14.0, Hct 43.4, Plt Count 336


10/01/20 21:07: Sodium 141, Potassium 3.9, BUN 12, Creatinine 0.88, Glucose 93, 

Magnesium 2.2, Total Bilirubin 0.4, AST 14 L, ALT 19, Alkaline Phosphatase 69








<Ricky Mcginnis - Last Filed: 10/01/20 23:59>





Assessment and Plan





- Plan


Assessment


COPD exacerbation





Plan


COPD exacerbation:  Have increased patient's prednisone from 10 mg daily to 20 

mg p.o. b.i.d..  Initiated antibiotic therapy with Levaquin.  Blood culture and 

sputum cultures obtained.  Nebulizer treatments and inhaler ordered, pulmonology

 consult in place.  DVT prophylaxis Lovenox 40 mg subcutaneous once daily.  

Appreciate further input from pulmonology.  Patient can likely be discharged 

tomorrow if he is doing well.


Discharge Plan: Home


Plan to discharge in: 24 Hours





- Advance Directives


Does patient have a Living Will: No


Does patient have a Durable POA for Healthcare: No





- Code Status/Comfort Care


Code Status Assessed: Yes (Patient is full code)


Critical Care: No


Time Spent Managing Pts Care (In Minutes): 55





<Ricky Mcginnis - Last Filed: 10/01/20 23:59>


Physician Review Additional Text: 





Plan of care discussed with Ricky Mcginnis, and I agree with the management plan as 

noted above.





<Devon Mccann - Last Filed: 10/03/20 17:46>

## 2020-10-02 NOTE — RAD REPORT
EXAM DESCRIPTION:  RAD - Chest Single View - 10/1/2020 10:23 pm

 

CLINICAL HISTORY:  DYSPNEA, cough, shortness of breath

 

COMPARISON:  June 2019 portable chest, April 2018 CT chest

 

TECHNIQUE:  AP portable chest image was obtained 10/1/2020 10:23 pm .

 

FINDINGS:  Patient has very extensive interstitial fibrotic pattern not substantially different from 
comparison. Apical bullous cavities are present along with scattered areas of upper lobe nodularity o
n the right. Fullness of the right hilum is not clearly different when adjusting for the slight rotat
ion on this examination. Heart and vasculature are normal. No pneumothorax or large pleural effusion.
 No acute bony abnormality seen. No acute aortic findings suspected.

 

IMPRESSION:  Patient has extensive chronic interstitial lung disease as detailed.

 

Early or mild edema or infiltrate changes can be masked by severity of chronic disease.

## 2020-10-02 NOTE — EKG
Test Date:    2020-10-01               Test Time:    21:56:09

Technician:   RV                                     

                                                     

MEASUREMENT RESULTS:                                       

Intervals:                                           

Rate:         95                                     

DE:           142                                    

QRSD:         116                                    

QT:           374                                    

QTc:          469                                    

Axis:                                                

P:            73                                     

DE:           142                                    

QRS:          114                                    

T:            55                                     

                                                     

INTERPRETIVE STATEMENTS:                                       

                                                     

Normal sinus rhythm

Right bundle branch block

Left posterior fascicular block

*** Bifascicular block ***

Abnormal ECG

Compared to ECG 06/15/2019 01:11:17

Right bundle-branch block now present

Left posterior fascicular block now present

Bifascicular block now present

Sinus tachycardia no longer present



Electronically Signed On 10-02-20 11:37:18 CDT by Jermaine Luque

## 2020-10-02 NOTE — P.PN
Subjective


Date of Service: 10/02/20


Primary Care Provider: Dr. Samuels, Dr. Ha


Chief Complaint: COPD exacerbation


Subjective: Improving (mild improvement in breathing)





Physical Examination





- Vital Signs


Temperature: 98.6 F


Blood Pressure: 118/76


Pulse: 98


Respirations: 20


Pulse Ox (%): 96





- Physical Exam


General: Alert


Neck: JVD not distended


Respiratory: Diminished (diffusely), Expiratory wheezes (diffuse)


Cardiovascular: No edema, Regular rate/rhythm


Gastrointestinal: Soft and benign, Non-distended, No tenderness


Musculoskeletal: No erythema, No tenderness


Integumentary: No rashes


Neurological: Normal speech, Normal affect





- Studies


Laboratory Data (last 24 hrs)





10/01/20 21:07: PT 11.6, INR 0.98


10/01/20 21:07: WBC 13.4 H, Hgb 14.0, Hct 43.4, Plt Count 336


10/01/20 21:07: Sodium 141, Potassium 3.9, BUN 12, Creatinine 0.88, Glucose 93, 

Magnesium 2.2, Total Bilirubin 0.4, AST 14 L, ALT 19, Alkaline Phosphatase 69








Assessment & Plan


Physician Review Additional Text: 





Acute on Chronic COPD exacerbation





Plan


Acute on Chronic COPD exacerbation:  


-pt with severe chronic COPD, has had multiple admissions for COPD exacerbations

in the past, this is at least his 2nd this year


-Follow with Dr. Ha in outpatient - consulted


-started on PO steroids and Levaquin - after review with Dr. Ha, will 

treat more aggressively with IV Solumedrol 40 q8hr and IV Cefepime and PO Doxy


-pt has been on levaquin several times in the past, has had recurrent 

pseudomonas lung infections


-awaiting sputum cultures


-scheduled nebs








Dispo: high risk for failure at home, anticipate dc home in 24-48hrs


Time Spent Managing Pts Care (In Minutes): 35

## 2020-10-03 VITALS — TEMPERATURE: 97.7 F

## 2020-10-03 VITALS — DIASTOLIC BLOOD PRESSURE: 56 MMHG | SYSTOLIC BLOOD PRESSURE: 99 MMHG

## 2020-10-03 VITALS — OXYGEN SATURATION: 96 %

## 2020-10-03 LAB
BLD SMEAR INTERP: (no result)
BUN BLD-MCNC: 15 MG/DL (ref 7–18)
GLUCOSE SERPLBLD-MCNC: 127 MG/DL (ref 74–106)
HCT VFR BLD CALC: 40.6 % (ref 39.6–49)
LYMPHOCYTES # SPEC AUTO: 0.8 K/UL (ref 0.7–4.9)
MAGNESIUM SERPL-MCNC: 2.2 MG/DL (ref 1.8–2.4)
MORPHOLOGY BLD-IMP: (no result)
PMV BLD: 9.2 FL (ref 7.6–11.3)
POTASSIUM SERPL-SCNC: 4.6 MMOL/L (ref 3.5–5.1)
RBC # BLD: 4.54 M/UL (ref 4.33–5.43)

## 2020-10-03 RX ADMIN — ENOXAPARIN SODIUM SCH: 40 INJECTION SUBCUTANEOUS at 09:00

## 2020-10-03 RX ADMIN — DOXYCYCLINE SCH MG: 100 CAPSULE ORAL at 09:09

## 2020-10-03 RX ADMIN — CEFEPIME SCH MLS: 1 INJECTION, POWDER, FOR SOLUTION INTRAMUSCULAR; INTRAVENOUS at 09:09

## 2020-10-03 RX ADMIN — METHYLPREDNISOLONE SODIUM SUCCINATE SCH MG: 40 INJECTION, POWDER, FOR SOLUTION INTRAMUSCULAR; INTRAVENOUS at 00:50

## 2020-10-03 RX ADMIN — IPRATROPIUM BROMIDE SCH MG: 0.5 SOLUTION RESPIRATORY (INHALATION) at 01:25

## 2020-10-03 RX ADMIN — SPIRONOLACTONE SCH MG: 25 TABLET, FILM COATED ORAL at 09:09

## 2020-10-03 RX ADMIN — FUROSEMIDE SCH MG: 20 TABLET ORAL at 09:09

## 2020-10-03 RX ADMIN — Medication SCH ML: at 09:00

## 2020-10-03 RX ADMIN — IPRATROPIUM BROMIDE SCH MG: 0.5 SOLUTION RESPIRATORY (INHALATION) at 07:30

## 2020-10-03 RX ADMIN — ALBUTEROL SULFATE SCH MG: 2.5 SOLUTION RESPIRATORY (INHALATION) at 01:25

## 2020-10-03 RX ADMIN — ALBUTEROL SULFATE SCH MG: 2.5 SOLUTION RESPIRATORY (INHALATION) at 07:30

## 2020-10-03 RX ADMIN — METHYLPREDNISOLONE SODIUM SUCCINATE SCH MG: 40 INJECTION, POWDER, FOR SOLUTION INTRAMUSCULAR; INTRAVENOUS at 09:10

## 2020-10-03 RX ADMIN — MOMETASONE FUROATE AND FORMOTEROL FUMARATE DIHYDRATE SCH PUFF: 100; 5 AEROSOL RESPIRATORY (INHALATION) at 09:00

## 2020-10-03 NOTE — P.DS
Admission Date: 10/02/20


Discharge Date: 10/03/20


Primary Care Provider: Dr. Dilcia Sears


Disposition: ROUTINE DISCHARGE


Discharge Condition: FAIR


Reason for Admission: COPD exacerbation


Consultations: 


Pulmonology  - Dr. Ha


Procedures: 


CXR (10/1/20): very extensive interstitial fibrotic pattern not substantially 

different from comparison. Apical bullous cavities are present along with 

scattered areas of upper lobe nodularity on the right. Fullness of the right 

hilum is not clearly different when adjusting for the slight rotation on this 

examination. Heart and vasculature are normal. No pneumothorax or large pleural 

effusion. 





Problem List:


Acute on Chronic COPD exacerbation





Brief History of Present Illness: 


59yo male, PMH: severe chronic COPD who presented with progressively worsening 

SOB over the past month with increased sputum production and fevers. He was 

admitted for further management of his acute COPD exacerbation.





Hospital Course: 


Due to his severity of COPD, his pulmonologist, Dr. Ha, was consulted. 

Patient has a history of pseudomonas infections and has had multiple courses of 

Levaquin in the past, so he was treated with IV cefepime and PO doxycycline per 

Dr. Ha's recommendations. He slowly improved throughout his 

hospitalization, his sputum culture preliminary grew normal respiratory felicia, 

so he was discharged home to complete a course of Levaquin and a prednisone 

taper (20mg BID x 1 week, then 10mg BID x1 week, then resume 10mg daily).


On day of discharge, patient felt like he was breathing much more comfortably 

and near his baseline. He will follow up with pulmonology, Dr. Ha. 

Recommend follow up on finalized cultures.





Vital Signs/Physical Exam: 














Temp Pulse Resp BP Pulse Ox


 


 97.7 F   92 H  20   99/56 L  94 


 


 10/03/20 10:51  10/03/20 10:51  10/03/20 10:51  10/03/20 10:51  10/03/20 10:51








General: Alert, In no apparent distress


HEENT: Mucous membr. moist/pink


Neck: Supple, JVD not distended


Respiratory: Diminished, Expiratory wheezes (mild, diffuse. on 3L NC)


Cardiovascular: No edema, Regular rate/rhythm, Normal S1 S2


Gastrointestinal: Soft and benign, Non-distended, No tenderness


Musculoskeletal: No erythema, No tenderness


Integumentary: No rashes, No breakdown


Neurological: Normal speech, Normal affect


Laboratory Data at Discharge: 














WBC  21.1 K/uL (4.3-10.9)  H* D 10/03/20  05:36    


 


Hgb  13.4 g/dL (13.6-17.9)  L  10/03/20  05:36    


 


Hct  40.6 % (39.6-49.0)   10/03/20  05:36    


 


Plt Count  322 K/uL (152-406)   10/03/20  05:36    


 


PT  11.6 SECONDS (9.5-12.5)   10/01/20  21:07    


 


INR  0.98   10/01/20  21:07    


 


Sodium  140 mmol/L (136-145)   10/03/20  05:36    


 


Potassium  4.6 mmol/L (3.5-5.1)   10/03/20  05:36    


 


BUN  15 mg/dL (7-18)   10/03/20  05:36    


 


Creatinine  0.78 mg/dL (0.55-1.3)   10/03/20  05:36    


 


Glucose  127 mg/dL ()  H  10/03/20  05:36    


 


Magnesium  2.2 mg/dL (1.8-2.4)   10/03/20  05:36    


 


Total Bilirubin  0.4 mg/dL (0.2-1.0)   10/01/20  21:07    


 


AST  14 U/L (15-37)  L  10/01/20  21:07    


 


ALT  19 U/L (12-78)   10/01/20  21:07    


 


Alkaline Phosphatase  69 U/L ()   10/01/20  21:07    








Home Medications: 








Albuterol Neb [Proventil 0.083% Neb Soln] 1 unit IN TID 06/15/19 


Ipratropium Neb [Atrovent*] 0.2 mg IH TID 09/16/19 


Colistin (Colistimethate Na) [Colistimethate] 1 puff IH DAILY 10/02/20 


Fluticasone/Umeclidin/Vilanter [Trelegy Ellipta 100-62.5-25] 1 puff IH DAILY 

10/02/20 


Furosemide [Lasix*] 20 mg PO DAILY 10/02/20 


Spironolactone/Hydrochlorothiazide 1 tab PO DAILY 10/02/20 


predniSONE [Deltasone*] 1 tab PO DAILY 10/02/20 


levoFLOXacin [Levaquin] 500 mg PO DAILY 7 Days #7 tab 10/03/20 


predniSONE [Deltasone*] 10 mg PO SEECOM 14 Days #42 tab 10/03/20 





New Medications: 


predniSONE [Deltasone*] 10 mg PO SEECOM 14 Days #42 tab


levoFLOXacin [Levaquin] 500 mg PO DAILY 7 Days #7 tab


Patient Discharge Instructions: Follow up with PCP within 1 week.  Follow up 

with Dr. Ha in 1-2 weeks.  Prednisone - take 20mg twice a day for 7 days, 

then take 10mg twice a day for 7 days, then resume your usual 10mg daily dose


Diet: Regular


Activity: Ad radha


Followup: 


Casey Ha MD [Primary Care Provider] - 1-2 Weeks


(pulmonologist- call to schedule an appointment


_____________________________________________________________)


Jackie Santos MD [COURTESY - CAN ADMIT] - 1 Week


(PCP- call to schedule an appointment


__________________________________________________________)

## 2020-10-03 NOTE — P.PN
Subjective


Date of Service: 10/03/20


Primary Care Provider: Dr. Samuels, Dr. Ha


Chief Complaint: COPD exacerbation


Subjective: Improving (Patient is doing better still complaining of shortness of

breath on exertion cultures are so far negative)





Review of Systems


Respiratory: Shortness of Breath





Physical Examination





- Vital Signs


Temperature: 97.7 F


Blood Pressure: 99/56


Pulse: 92


Respirations: 20


Pulse Ox (%): 94





- Physical Exam


General: Alert, Oriented x3, Mild distress


Respiratory: Expiratory wheezes


Cardiovascular: No edema, Normal S1 S2





Assessment & Plan





- Problems (Diagnosis)


(1) COPD exacerbation


Current Visit: No   Status: Acute   


Plan: 


P patient admitted with COPD exacerbation he is doing better sputum cultures and

negative chest x-ray clear white count is mildly elevated recommend discharge 

home on 20 b.i.d. of prednisone for 1 week then 10 b.i.d. and following in the 

3rd week 10 mg once a day levofloxacin fire mg once a day for 7 days he is to 

continue with his home medications patient is on maximum bronchodilator therapy 

he has had frequent recurrent exacerbations

## 2020-10-07 NOTE — XMS REPORT
Summary of Care

                          Created on:September 3, 2020



Patient:Jaxon Taylor

Sex:Male

:1960

External Reference #:[id number]





Demographics







                          Address                   [address] [place] [address]

 

                          Home Phone                [phone]

 

                          Mobile Phone              [phone]

 

                          Email Address             [email or web address]

 

                          Preferred Language        English

 

                          Marital Status            

 

                          Restorationist Affiliation     Unknown

 

                          Race                      White

 

                          Ethnic Group              Not  or 









Author







                          Organization              [facility]

 

                          Address                   [address] [place] [address]









Support







                Name            Relationship    Address         Phone

 

                No Contact      Unavailable     Unavailable     [phone]









Care Team Providers







                    Name                Role                Phone

 

                    Pcp, Patient Does Not Have A Primary Care Provider [phone]

0-0000









Reason for Visit







                          Reason                    Comments

 

                          LAB                       







Encounter Details







             Date         Type         Department   Care Team    Description

 

                2020      Laboratory Only [facility] Izabella Ocasio, JASVIR



[address] [place] [id number] [phone] (Fax)                      Suspected Covid-19



                                                            [facility]



                                        



Lab, Ad[facility] Fam P[facility] I                      Virus Infection



                                       [address] [place] [address]              (Primary D

x)



                                                            [place], [place]              



                                                            [id number] [phone]              







Allergies

Not on Filedocumented as of this encounter (statuses as of 2020)



Medications

Not on filedocumented as of this encounter (statuses as of 2020)



Active Problems

Not on filedocumented as of this encounter (statuses as of 2020)



Social History







             Tobacco Use  Types        Packs/Day    Years Used   Date

 

             Never Assessed                                        









                          Sex Assigned at Birth     Date Recorded

 

                          Not on file               









                    COVID-19 Exposure   Response            Date Recorded

 

                    In the last month, have you been in contact with Yes        

         9/3/2020  1:29 PM CDT



                    someone who was confirmed or suspected to have              

       



                    Coronavirus / COVID-19?                     



documented as of this encounter



Last Filed Vital Signs

Not on filedocumented in this encounter



Nursing Notes

Kay Humphreys MA - 2020  1:00 PM CDTMichrichard Taylor is a 60 year old 
male here for COVID Screening with a Nasopharyngeal Swab



All droplet and contact precautions taken with appropriate PPE worn while 
interacting with patient.

? Goggles

? N95 Mask

? Gloves

? Gown



RR 17 Pulse Ox 94%



Patient educated on plan of care for visit, swabbing technique, risks and 
benefits of test and length of time to receive results. Verbal consent obtained 
to perform test. CDC Fact Sheet for Patients nCoV Diagnostic Panel dated 
3/15/2020 and Factsheet What to Do if Sick with COVID 19 20 provided.



Patient swabbed per appropriate nasopharyngeal technique, and patient tolerated 
well. Patient was discharged from the testing clinic in stable condition.

Swabbed in both nairs.



Kay Humphreys MA  9/3/2020  1:30 PM

Electronically signed by Kay Humphreys MA at 2020  1:34 PM CDT
documented in this encounter



Plan of Treatment







             Name         Type         Priority     Associated Diagnoses Order S

chedule

 

             COVID-19 (PCR MOLECULAR LAB          Routine      Suspected Covid-1

9 Virus Expected: 

2020,



             TESTING)                               Infection    Expires: 2021









                Health Maintenance Due Date        Last Done       Comments

 

                HEPATITIS C (HCV) SCREEN 1960                      

 

                Depression Screening 1971                      

 

                DTaP,Tdap,and Td Vaccines ( - 1978                      



                Tdap)                                           

 

                COLON CANCER SCREENING ANNUAL 2009                      



                FIT/FOBT                                        

 

                COLON CANCER SCREENING FIT DNA 2009                      



                EVERY 3 YEARS                                   

 

                COLON CANCER SCREENING 2009                      



                SIGMOIDOSCOPY EVERY 5 YEARS                                 

 

                COLONOSCOPY     2009                      

 

                Colorectal Cancer Screening 2009                      

 

                Zoster Recombinant Vaccine 2009                      



                (SHINGRIX) (1 of 2)                                 

 

                INFLUENZA VACCINE (#1) 2020                      

 

                PNEUMOCOCCAL 0-64 YEARS COMBINED Aged Out                       

 No longer eligible based on



                SERIES                                          patient's age to

 complete



                                                                this topic



documented as of this encounter



Results

Not on filedocumented in this encounter



Visit Diagnoses







                                        Diagnosis

 

                                        Suspected Covid-19 Virus Infection - [facility]



documented in this encounter



Additional Health Concerns







                Infection       Onset Date      Last Indicated  Resolved Time

 

                COVID-19 Rule Out 2020      



documented as of this encounter



Insurance







          Payer     Benefit Plan / Group Subscriber ID Effective Dates Phone    

 Address   Type

 

          [employer]     [employer] [id number] 2018-Present                     HMO









           Guarantor Name Account Type Relation to Date of    Phone      Billing



                                 Patient    Birth                 Address

 

           Jaxon Taylor Personal/Family Self       1960 [phone] 







                                                       (Home)     [place], [place]



                                                                  [id number]



documented as of this encounter

## 2020-10-07 NOTE — XMS REPORT
Summary of Care

                          Created on:2020



Patient:Jaxno Taylor

Sex:Male

:1960

External Reference #:[id number]





Demographics







                          Address                   [address] [place] [address]

 

                          Home Phone                [phone]

 

                          Mobile Phone              [phone]

 

                          Email Address             [email or web address]

 

                          Email Address             [email or web address]

 

                          Preferred Language        English

 

                          Marital Status            

 

                          Mandaeism Affiliation     Unknown

 

                          Race                      White

 

                          Ethnic Group              Not  or 









Author







                          Organization              [facility]

 

                          Address                   [address] [place] [address]









Support







                Name            Relationship    Address         Phone

 

                No Contact      Unavailable     Unavailable     [phone]









Care Team Providers







                    Name                Role                Phone

 

                    Pcp, Patient Does Not Have A Primary Care Provider +[phone]

0-0000









Encounter Details







             Date         Type         Department   Care Team    Description

 

                    2020          Letter (Out)        [facility]



                                        Madina Shen RN



                                        



                                                            [address] [address]



                          [place] [address]       



                                       [phone]              







Allergies

Not on Filedocumented as of this encounter (statuses as of 2020)



Medications

Not on filedocumented as of this encounter (statuses as of 2020)



Active Problems

Not on filedocumented as of this encounter (statuses as of 2020)



Social History







             Tobacco Use  Types        Packs/Day    Years Used   Date

 

             Never Assessed                                        









                          Sex Assigned at Birth     Date Recorded

 

                          Not on file               









                    COVID-19 Exposure   Response            Date Recorded

 

                    In the last month, have you been in contact with Yes        

         9/3/2020  1:29 PM CDT



                    someone who was confirmed or suspected to have              

       



                    Coronavirus / COVID-19?                     



documented as of this encounter



Last Filed Vital Signs

Not on filedocumented in this encounter



Plan of Treatment







                Health Maintenance Due Date        Last Done       Comments

 

                HEPATITIS C (HCV) SCREEN 1960                      

 

                Depression Screening 1971                      

 

                DTaP,Tdap,and Td Vaccines ( - 1978                      



                Tdap)                                           

 

                COLON CANCER SCREENING ANNUAL 2009                      



                FIT/FOBT                                        

 

                COLON CANCER SCREENING FIT DNA 2009                      



                EVERY 3 YEARS                                   

 

                COLON CANCER SCREENING 2009                      



                SIGMOIDOSCOPY EVERY 5 YEARS                                 

 

                COLONOSCOPY     2009                      

 

                Colorectal Cancer Screening 2009                      

 

                Zoster Recombinant Vaccine 2009                      



                (SHINGRIX) (1 of 2)                                 

 

                INFLUENZA VACCINE (#1) 2020                      

 

                PNEUMOCOCCAL 0-64 YEARS COMBINED Aged Out                       

 No longer eligible based on



                SERIES                                          patient's age to

 complete



                                                                this topic



documented as of this encounter



Results

Not on filedocumented in this encounter



Additional Health Concerns







                Infection       Onset Date      Last Indicated  Resolved Time

 

                COVID-19 Rule Out 2020 12:

40 PM CDT



documented as of this encounter



Insurance







          Payer     Benefit Plan / Group Subscriber ID Effective Dates Phone    

 Address   Type

 

          AETNA     [employer]ET[employer] O [id number] 2018-Present                     HMO



documented as of this encounter

## 2020-10-07 NOTE — XMS REPORT
Continuity of Care Document

                           Created on:2020



Patient:FLASH AGUILERA

Sex:Male

:1960

External Reference #:[id number]





Demographics







                          Address                   [address] [place] [address]

 

                          Home Phone                [phone]

 

                          Mobile Phone              [phone]

 

                          Email Address             [email or web address]

[email or web address]

 

                          Preferred Language        English

 

                          Marital Status            Unknown

 

                          Catholic Affiliation     Unknown

 

                          Race                      Unknown

 

                          Additional Race(s)        Unavailable



                                                    White

 

                          Ethnic Group              Unknown









Author







                          Organization              [facility]

 

                          Address                   [address] [place] [address] [address]



                                                    [place] [address]

 

                          Phone                     [phone]









Support







                Name            Relationship    Address         Phone

 

                Contact         Other           Unavailable     [phone]









Care Team Providers







                    Name                Role                Phone

 

                    Ac CALDWELL,  T       Attending Clinician Unavailable

 

                    Lab,  Fam Pob I     Attending Clinician Unavailable









Problems

This patient has no known problems.



Allergies, Adverse Reactions, Alerts

This patient has no known allergies or adverse reactions.



Medications

This patient has no known medications.



Procedures

This patient has no known procedures.



Encounters







        Start   End     Encounter Admission Attending Care    Care    Encounter 

Source



        Date/Time Date/Time Type    Type    Clinicians Facility Department ID   

   

 

        2020 Letter          DEB Shen    [id number]

[id number] 



        00:00:00 00:00:00 (Out)           Madina DIXON   [id number]         



                                                [facility] [id number]         



                                                        [phone]         



                                                        019             

 

        2020 Laboratory         Lab, [facility]    [id number]

[id number] 



        13:13:24 13:34:21 Only            Fam Pob I [facility]  [id number]         



                                                [place] [id number]         



                                                [place] [phone]         



                                                nal     044             



                                                Office                  



                                                Building                 



                                                One                     







Results

This patient has no known results.

## 2021-01-03 ENCOUNTER — HOSPITAL ENCOUNTER (INPATIENT)
Dept: HOSPITAL 97 - ER | Age: 61
LOS: 4 days | Discharge: HOME | DRG: 191 | End: 2021-01-07
Attending: FAMILY MEDICINE | Admitting: INTERNAL MEDICINE
Payer: COMMERCIAL

## 2021-01-03 VITALS — BODY MASS INDEX: 22.3 KG/M2

## 2021-01-03 DIAGNOSIS — Z99.81: ICD-10-CM

## 2021-01-03 DIAGNOSIS — Z79.899: ICD-10-CM

## 2021-01-03 DIAGNOSIS — I45.2: ICD-10-CM

## 2021-01-03 DIAGNOSIS — J44.1: Primary | ICD-10-CM

## 2021-01-03 DIAGNOSIS — Z90.49: ICD-10-CM

## 2021-01-03 DIAGNOSIS — Z20.822: ICD-10-CM

## 2021-01-03 DIAGNOSIS — Z79.52: ICD-10-CM

## 2021-01-03 LAB
ALBUMIN SERPL BCP-MCNC: 3.7 G/DL (ref 3.4–5)
ALP SERPL-CCNC: 68 U/L (ref 45–117)
ALT SERPL W P-5'-P-CCNC: 14 U/L (ref 12–78)
AST SERPL W P-5'-P-CCNC: 12 U/L (ref 15–37)
BUN BLD-MCNC: 10 MG/DL (ref 7–18)
CRP SERPL-MCNC: 35.1 MG/L (ref ?–3)
FERRITIN SERPL-MCNC: 99.8 NG/ML (ref 26–388)
GLUCOSE SERPLBLD-MCNC: 130 MG/DL (ref 74–106)
HCT VFR BLD CALC: 43.4 % (ref 39.6–49)
INR BLD: 0.99
LYMPHOCYTES # SPEC AUTO: 1.2 K/UL (ref 0.7–4.9)
MAGNESIUM SERPL-MCNC: 2.2 MG/DL (ref 1.8–2.4)
NT-PROBNP SERPL-MCNC: 40 PG/ML (ref ?–125)
PMV BLD: 8.8 FL (ref 7.6–11.3)
POTASSIUM SERPL-SCNC: 3.7 MMOL/L (ref 3.5–5.1)
RBC # BLD: 4.98 M/UL (ref 4.33–5.43)
TROPONIN (EMERG DEPT USE ONLY): < 0.02 NG/ML (ref 0–0.04)

## 2021-01-03 PROCEDURE — 99284 EMERGENCY DEPT VISIT MOD MDM: CPT

## 2021-01-03 PROCEDURE — 87040 BLOOD CULTURE FOR BACTERIA: CPT

## 2021-01-03 PROCEDURE — 71045 X-RAY EXAM CHEST 1 VIEW: CPT

## 2021-01-03 PROCEDURE — 83605 ASSAY OF LACTIC ACID: CPT

## 2021-01-03 PROCEDURE — 36415 COLL VENOUS BLD VENIPUNCTURE: CPT

## 2021-01-03 PROCEDURE — 0240U: CPT

## 2021-01-03 PROCEDURE — 80076 HEPATIC FUNCTION PANEL: CPT

## 2021-01-03 PROCEDURE — 83880 ASSAY OF NATRIURETIC PEPTIDE: CPT

## 2021-01-03 PROCEDURE — 96375 TX/PRO/DX INJ NEW DRUG ADDON: CPT

## 2021-01-03 PROCEDURE — 83735 ASSAY OF MAGNESIUM: CPT

## 2021-01-03 PROCEDURE — 87070 CULTURE OTHR SPECIMN AEROBIC: CPT

## 2021-01-03 PROCEDURE — 84484 ASSAY OF TROPONIN QUANT: CPT

## 2021-01-03 PROCEDURE — 85610 PROTHROMBIN TIME: CPT

## 2021-01-03 PROCEDURE — 84145 PROCALCITONIN (PCT): CPT

## 2021-01-03 PROCEDURE — 82805 BLOOD GASES W/O2 SATURATION: CPT

## 2021-01-03 PROCEDURE — 85025 COMPLETE CBC W/AUTO DIFF WBC: CPT

## 2021-01-03 PROCEDURE — 93005 ELECTROCARDIOGRAM TRACING: CPT

## 2021-01-03 PROCEDURE — 94760 N-INVAS EAR/PLS OXIMETRY 1: CPT

## 2021-01-03 PROCEDURE — 87205 SMEAR GRAM STAIN: CPT

## 2021-01-03 PROCEDURE — 96365 THER/PROPH/DIAG IV INF INIT: CPT

## 2021-01-03 PROCEDURE — 86140 C-REACTIVE PROTEIN: CPT

## 2021-01-03 PROCEDURE — 82728 ASSAY OF FERRITIN: CPT

## 2021-01-03 PROCEDURE — 80048 BASIC METABOLIC PNL TOTAL CA: CPT

## 2021-01-03 PROCEDURE — 83036 HEMOGLOBIN GLYCOSYLATED A1C: CPT

## 2021-01-03 RX ADMIN — ARFORMOTEROL TARTRATE SCH MCG: 15 SOLUTION RESPIRATORY (INHALATION) at 22:12

## 2021-01-03 RX ADMIN — SODIUM CHLORIDE SCH: 9 INJECTION, SOLUTION INTRAVENOUS at 22:12

## 2021-01-03 NOTE — P.HP
Certification for Inpatient


Patient admitted to: Observation


With expected LOS: <2 Midnights


Practitioner: I am a practitioner with admitting privileges, knowledge of 

patient current condition, hospital course, and medical plan of care.


Services: Services provided to patient in accordance with Admission requirements

found in Title 42 Section 412.3 of the Code of Federal Regulations





Patient History


Date of Service: 01/04/21


Reason for admission: COPD exacerbation


History of Present Illness: 





Patient is 60 years of age well known to me with a history of frequent 

exacerbation severe terminal COPD admitted with 2 day history of worsening 

shortness of breath chest pain congestion productive cough is a history of 

Pseudomonas infection patient is on maximum therapy at home


Allergies





No Known Allergies Allergy (Verified 10/02/20 01:41)


   





Home Medications: 








Albuterol Neb [Proventil 0.083% Neb Soln] 1 unit IN TID 06/15/19 


Ipratropium Neb [Atrovent*] 0.2 mg IH TID 09/16/19 


Colistin (Colistimethate Na) [Colistimethate] 1 puff IH DAILY 10/02/20 


Fluticasone/Umeclidin/Vilanter [Trelegy Ellipta 100-62.5-25] 1 puff IH DAILY 

10/02/20 


Furosemide [Lasix*] 20 mg PO DAILY 10/02/20 


Spironolactone/Hydrochlorothiazide 1 tab PO DAILY 10/02/20 


predniSONE [Deltasone*] 1 tab PO DAILY 10/02/20 


levoFLOXacin [Levaquin] 500 mg PO DAILY 7 Days #7 tab 10/03/20 


predniSONE [Deltasone*] 10 mg PO SEECOM 14 Days #42 tab 10/03/20 








- Past Medical/Surgical History


Diabetic: No


-: COPD - emphysema on home oxygen and chronic steroids


-: History of tobacco abuse


-: esophageal stricture/dysphagia


-: mastoidectomy


-: appendectomy


Psychosocial/ Personal History: Patient lives at home with his wife and 2 

daughters and is currently working at [employer].





- Family History


  ** Mother


-: Heart disease, Hypertension





  ** Father


-: Heart disease, Hypertension, Stroke





- Social History


Alcohol use: No


CD- Drugs: No


Caffeine use: Yes





Review of Systems


10-point ROS is otherwise unremarkable


General: Fever, Weakness


Respiratory: Cough, Shortness of Breath





Physical Examination





- Physical Exam


General: Alert, Moderate distress


Respiratory: Expiratory wheezes


Cardiovascular: No edema, Normal S1 S2


Gastrointestinal: Normal bowel sounds, Soft and benign


Musculoskeletal: No swelling


Integumentary: No rashes, No breakdown





Assessment and Plan





- Problems (Diagnosis)


(1) COPD exacerbation


Current Visit: No   Status: Acute   


Plan: 


Patient is 60 years of age admitted with COPD exacerbation he has had frequent 

exacerbation he takes steroids current Pseudomonas infection will admit patient 

for 2 check ABG the trial of BiPAP bronchodilators meropenem labs and patient 

has had Pseudomonas infection before. CXRY abnormal. WBC elevated. LAbs reviewed








- Advance Directives


Does patient have a Living Will: No


Does patient have a Durable POA for Healthcare: No

## 2021-01-03 NOTE — ER
Nurse's Notes                                                                                     

 [facility]                                                                 

Name: Jaxon Taylor                                                                             

Age: 60 yrs                                                                                       

Sex: Male                                                                                         

: 1960                                                                                   

MRN: [id number]                                                                                   

Arrival Date: 2021                                                                          

Time: 21:14                                                                                       

Account#: [id number]                                                                            

Bed 25                                                                                            

Private MD:                                                                                       

Diagnosis: Chronic obstructive pulmonary disease with acute lower respiratory infection;Hypoxemia 

                                                                                                  

Presentation:                                                                                     

                                                                                             

21:35 Chief complaint: Patient states: SOB for 1 week. No fever. Coronavirus screen: Client   ll1 

      denies travel out of the [place] in the last 14 days. At this time, the client does not        

      indicate any symptoms associated with coronavirus-19. Ebola Screen: Patient denies          

      travel to an Ebola-affected area in the 21 days before illness onset. Initial Sepsis        

      Screen: Does the patient meet any 2 criteria? RR > 20 per min. HR > 90 bpm. Does the        

      patient have a suspected source of infection? Yes: Productive cough/pneumonia. Risk         

      Assessment: Do you want to hurt yourself or someone else? Patient reports no desire to      

      harm self or others. Onset of symptoms was 2020.                               

21:35 Method Of Arrival: Ambulatory                                                           [facility] 

21:35 Acuity: FROILAN 2                                                                           ll1 

                                                                                                  

Historical:                                                                                       

- Allergies:                                                                                      

21:36 No Known Allergies;                                                                     ll1 

- PMHx:                                                                                           

21:34 COPD; Emphysema;                                                                        ll1 

- PSHx:                                                                                           

21:34 Mastoidectomy; Appendectomy;                                                            ll1 

                                                                                                  

- Immunization history:: Flu vaccine is up to date.                                               

- Social history:: Smoking status: Patient/guardian denies using tobacco.                         

                                                                                                  

                                                                                                  

Screenin:40 Abuse screen: Denies threats or abuse. Nutritional screening: No deficits noted.        vg1 

      Tuberculosis screening: No symptoms or risk factors identified. Fall Risk No fall in        

      past 12 months (0 pts). No secondary diagnosis (0 pts). IV access (20 points).              

      Ambulatory Aid- None/Bed Rest/Nurse Assist (0 pts). Gait- Normal/Bed Rest/Wheelchair (0     

      pts) Mental Status- Oriented to own ability (0 pts). Total Pa Fall Scale indicates       

      No Risk (0-24 pts).                                                                         

                                                                                                  

Assessment:                                                                                       

21:40 General: Appears in no apparent distress. Behavior is calm, cooperative.                vg1 

21:40 Pain: Denies pain. Neuro: Level of Consciousness is awake, alert, obeys commands,       vg1 

      Oriented to person, place, time, situation. Cardiovascular: Patient's skin is warm and      

      dry. Rhythm is sinus tachycardia. Respiratory: Reports shortness of breath cough that       

      is productive, Airway is patent Respiratory effort is even, labored, Breath sounds with     

      wheezes bilaterally. Respiratory:. GI: No signs and/or symptoms were reported involving     

      the gastrointestinal system. : No signs and/or symptoms were reported regarding the       

      genitourinary system. EENT: No signs and/or symptoms were reported regarding the EENT       

      system. Derm: Skin is intact, is healthy with good turgor. Musculoskeletal:                 

      Circulation, motion, and sensation intact.                                                  

                                                                                                  

Vital Signs:                                                                                      

21:35  / 71; Pulse 130; Resp 32; Temp 100.2; Pulse Ox 88% on 6 lpm NC; Weight 72.57 kg; ll1 

      Height 5 ft. 11 in. (180.34 cm); Pain 0/10;                                                 

21:40  / 70; Pulse 125; Resp 20; Pulse Ox 95% on 4.5 lpm NC;                            vg1 

22:30  / 70; Pulse 115; Resp 24; Pulse Ox 97% on 4.5 lpm NC;                            vg1 

21:35 Body Mass Index 22.32 (72.57 kg, 180.34 cm)                                             ll1 

                                                                                                  

ED Course:                                                                                        

21:14 Patient arrived in ED.                                                                  ag3 

21:32 Arm band placed on.                                                                     ll1 

21:36 Triage completed.                                                                       ll1 

21:38 Jolie Velarde, RN is Primary Nurse.                                                  vg1 

21:40 Patient has correct armband on for positive identification. Placed in gown. Bed in low  vg1 

      position. Call light in reach. Cardiac monitor on. Pulse ox on. NIBP on.                    

21:43 Renzo Galindo PA is PHCP.                                                                cp  

21:43 Juan Rene MD is Attending Physician.                                            cp  

22:20 Inserted saline lock: 20 gauge in left forearm, using aseptic technique. Blood          lp1 

      collected.                                                                                  

22:20 Initial lab(s) drawn, by me, sent to lab. First set of blood cultures drawn by me.      lp1 

22:26 XRAY Chest (1 view) In Process Unspecified.                                             EDMS

23:18 Casey Ha MD is Hospitalizing Provider.                                         cp  

                                                                                             

01:31 No provider procedures requiring assistance completed.                                  vg1 

04:26 Primary Nurse role handed off by Jolie Velarde, RN                                   mw2 

06:10 Abhishek Merrill, RN is Primary Nurse.                                                     jb4 

                                                                                                  

Administered Medications:                                                                         

                                                                                             

22:32 Drug: SOLU-Medrol 125 mg Route: IVP; Site: left forearm;                                lp1 

22:32 Drug: NS 0.9% 1000 ml Route: IV; Rate: 1 bolus; Site: left forearm;                     lp1 

23:30 Follow up: IV Status: Completed infusion; IV Intake: 1000ml                             vg1 

22:53 CANCELLED (Medicaton unavailable. Provider notified): Albuterol HFA Inhaler 2 puffs     vg1 

      Inhalation once                                                                             

22:53 Drug: LevaQUIN 500 mg Volume: 100 ml; Route: IVPB; Infused Over: 60 mins; Site: left    vg1 

      antecubital;                                                                                

23:50 Follow up: IV Status: Completed infusion; IV Intake: 100ml                              vg1 

                                                                                                  

                                                                                                  

Intake:                                                                                           

23:30 IV: 1000ml; Total: 1000ml.                                                              vg1 

23:50 IV: 100ml; Total: 1100ml.                                                               vg1 

                                                                                                  

Outcome:                                                                                          

23:19 Decision to Hospitalize by Provider.                                                    chaitanya  

                                                                                             

13:55 Patient left the ED.                                                                    dm5 

                                                                                                  

Signatures:                                                                                       

Dispatcher [employer]                           EDMS                                                 

Aundrea Turner RN                    RN   dm5                                                  

Mohini Serra, RN                         RN   lp1                                                  

Renzo Galindo PA                         PA   cp                                                   

Abhishek Merrill, RN                       RN   jb4                                                  

Renan Brink                            mw2                                                  

Teodora Mcclain3                                                  

Jolie Velarde, RN                    RN   vg1                                                  

Nehemiah Monae RN                       RN   ll1                                                  

                                                                                                  

**************************************************************************************************

## 2021-01-03 NOTE — EDPHYS
Physician Documentation                                                                           

 [facility]                                                                 

Name: Jaxon Taylor                                                                             

Age: 60 yrs                                                                                       

Sex: Male                                                                                         

: 1960                                                                                   

MRN: [id number]                                                                                   

Arrival Date: 2021                                                                          

Time: 21:14                                                                                       

Account#: [id number]                                                                            

Bed 25                                                                                            

Private MD:                                                                                       

ED Physician Juan Rene                                                                     

HPI:                                                                                              

                                                                                             

22:00 This 60 yrs old  Male presents to ER via Ambulatory with complaints of         cp  

      Breathing Difficulty.                                                                       

22:00 The patient has shortness of breath at rest. Onset: The symptoms/episode began/occurred cp  

      1 week(s) ago.                                                                              

22:00 Duration: The symptoms are continuous, and are steadily getting worse.                  cp  

22:00 Associated signs and symptoms: Pertinent positives: productive cough, Pertinent         cp  

      negatives: chest pain, fever, vomiting. Severity of symptoms: in the emergency              

      department the symptoms are unchanged despite home interventions.                           

                                                                                                  

Historical:                                                                                       

- Allergies:                                                                                      

21:36 No Known Allergies;                                                                     ll1 

- PMHx:                                                                                           

21:34 COPD; Emphysema;                                                                        ll1 

- PSHx:                                                                                           

21:34 Mastoidectomy; Appendectomy;                                                            ll1 

                                                                                                  

- Immunization history:: Flu vaccine is up to date.                                               

- Social history:: Smoking status: Patient/guardian denies using tobacco.                         

                                                                                                  

                                                                                                  

ROS:                                                                                              

22:05 Constitutional: Negative for body aches, chills, fever, poor PO intake.                 cp  

22:05 Eyes: Negative for injury, pain, redness, and discharge.                                cp  

22:05 ENT: Negative for ear pain, sore throat, difficulty swallowing, difficulty handling         

      secretions.                                                                                 

22:05 Cardiovascular: Negative for chest pain, edema.                                             

22:05 Respiratory: Positive for cough, "sounds productive", shortness of breath, at rest.         

      wheezing.                                                                                   

22:05 Abdomen/GI: Negative for abdominal pain, nausea, vomiting, and diarrhea.                    

22:05 Neuro: Negative for dizziness, headache, syncope, weakness.                                 

22:05 All other systems are negative.                                                             

                                                                                                  

Exam:                                                                                             

22:00 ECG was reviewed by the Attending Physician.                                            cp  

22:08 Constitutional: The patient appears alert, awake, non-diaphoretic, non-toxic, well      cp  

      developed, well nourished, in obvious distress, mildly distressed.                          

22:08 Head/Face:  Normocephalic, atraumatic.                                                  cp  

22:08 Eyes: Periorbital structures: appear normal, Conjunctiva: normal, no exudate, no            

      injection, Sclera: no appreciated abnormality, Lids and lashes: appear normal,              

      bilaterally.                                                                                

22:08 ENT: External ear(s): are unremarkable, Nose: is normal, Mouth: Lips: moist, Oral           

      mucosa: moist, Posterior pharynx: Airway: no evidence of obstruction, patent.               

22:08 Neck: ROM/movement: is normal, is supple, no meningismus, no nuchal rigidity.               

22:08 Chest/axilla: Inspection: normal, Palpation: is normal, no crepitus, no tenderness.         

22:08 Cardiovascular: Rate: tachycardic, Rhythm: regular, Edema: is not appreciated, JVD: is      

      not appreciated.                                                                            

22:08 Respiratory: mild respiratory distress is noted,  Respirations: labored breathing, that     

      is mild, intercostal retractions, that is mild, shallow respirations, that is mild,         

      Breath sounds: decreased breath sounds, that are moderate, throughout, stridor, is not      

      appreciated, wheezing: that is mild, is heard diffusely.                                    

22:08 Abdomen/GI: Inspection: abdomen appears normal, Palpation: abdomen is soft and              

      non-tender, in all quadrants.                                                               

22:08 Back: pain, is absent, ROM is normal.                                                       

22:08 Skin: no rash present.                                                                      

22:08 Neuro: Orientation: to person, place \T\ time. Mentation: is normal, Motor: moves all       

      fours, strength is normal.                                                                  

                                                                                                  

Vital Signs:                                                                                      

21:35  / 71; Pulse 130; Resp 32; Temp 100.2; Pulse Ox 88% on 6 lpm NC; Weight 72.57 kg; ll1 

      Height 5 ft. 11 in. (180.34 cm); Pain 0/10;                                                 

21:40  / 70; Pulse 125; Resp 20; Pulse Ox 95% on 4.5 lpm NC;                            vg1 

22:30  / 70; Pulse 115; Resp 24; Pulse Ox 97% on 4.5 lpm NC;                            vg1 

21:35 Body Mass Index 22.32 (72.57 kg, 180.34 cm)                                             ll1 

                                                                                                  

MDM:                                                                                              

22:00 Patient medically screened.                                                             cp  

23:20 Data reviewed: vital signs, nurses notes, lab test result(s), EKG, radiologic studies,  cp  

      plain films.                                                                                

23:20 Test interpretation: by ED physician or midlevel provider: ECG, chest xray negative for cp  

      focal pneumonia. Counseling: I had a detailed discussion with the patient and/or            

      guardian regarding: the historical points, exam findings, and any diagnostic results        

      supporting the discharge/admit diagnosis, lab results, radiology results, the need for      

      further work-up and treatment in the hospital. Response to treatment: the patient's         

      symptoms have markedly improved after treatment. Physician consultation: Casey Ha MD was contacted at 22:45, regarding admission, to the telemetry unit.             

      patient's condition.                                                                        

                                                                                                  

                                                                                             

21:56 Order name: Basic Metabolic Panel; Complete Time: 23:17                                 cp  

                                                                                             

23:17 Interpretation: Normal except: GLUC 130; GFR 86.                                        cp  

                                                                                             

21:56 Order name: CBC with Diff; Complete Time: 23:17                                         cp  

                                                                                             

00:27 Interpretation: Normal except: WBC 16.4; LEE% 81.6; LYM% 7.4; NEUT A 13.4; MNA 1.4.     cp  

                                                                                             

21:56 Order name: LFT's; Complete Time: 23:17                                                 cp  

                                                                                             

23:17 Interpretation: Normal except: AST 12.                                                  cp  

                                                                                             

21:56 Order name: Magnesium; Complete Time: 23:17                                             cp  

                                                                                             

21:56 Order name: NT PRO-BNP; Complete Time: 23:17                                            cp  

                                                                                             

21:56 Order name: PT-INR; Complete Time: 23:17                                                cp  

                                                                                             

21:56 Order name: Troponin (emerg Dept Use Only); Complete Time: 23:17                        cp  

                                                                                             

21:56 Order name: Lactate; Complete Time: 23:17                                               cp  

                                                                                             

21:56 Order name: Procalcitonin; Complete Time: 00:27                                         cp  

                                                                                             

21:56 Order name: CRP; Complete Time: 23:17                                                   cp  

                                                                                             

21:56 Order name: Ferritin; Complete Time: 23:17                                              cp  

                                                                                             

21:56 Order name: Blood Culture Adult (2)                                                     cp  

                                                                                             

21:56 Order name: XRAY Chest (1 view)                                                         cp  

                                                                                             

21:56 Order name: EKG; Complete Time: 21:57                                                   cp  

                                                                                             

22:16 Order name: Basic Metabolic Panel                                                       EDMS

                                                                                             

22:16 Order name: Basic Metabolic Panel                                                       EDMS

                                                                                             

22:16 Order name: CBC with Automated Diff                                                     EDMS

                                                                                             

22:16 Order name: CBC with Automated Diff                                                     EDMS

                                                                                             

22:18 Order name: Blood Culture                                                               EDMS

                                                                                             

22:18 Order name: Sputum Culture                                                              EDMS

                                                                                             

22:18 Order name: Sputum Gram Stain                                                           EDMS

                                                                                             

00:20 Order name: COVID-19/FLU A+B; Complete Time: 00:27                                      EDMS

                                                                                             

05:07 Order name: Manual Differential                                                         EDMS

                                                                                             

08:26 Order name: ABG Arterial Blood Gas                                                      EDMS

                                                                                             

21:56 Order name: Cardiac monitoring; Complete Time: 22:00                                    cp  

                                                                                             

21:56 Order name: EKG - Nurse/Tech; Complete Time: 22:00                                      cp  

                                                                                             

21:56 Order name: IV Saline Lock; Complete Time: 01:19                                        cp  

                                                                                             

21:56 Order name: Labs collected and sent; Complete Time: 01:19                               cp  

                                                                                             

21:56 Order name: O2 Per Protocol; Complete Time: 01:19                                       cp  

                                                                                             

21:56 Order name: O2 Sat Monitoring; Complete Time: 01:19                                     cp  

                                                                                             

22:16 Order name: Regular                                                                     EDMS

                                                                                                  

EC:00 Rate is 116 beats/min. Rhythm is regular. MO interval is normal. QRS interval is        cp  

      prolonged at 122 msec. QT interval is normal. T waves are Inverted in leads aVR, V2.        

      Interpreted by me. Reviewed by me.                                                          

                                                                                                  

Administered Medications:                                                                         

22:32 Drug: SOLU-Medrol 125 mg Route: IVP; Site: left forearm;                                lp1 

22:32 Drug: NS 0.9% 1000 ml Route: IV; Rate: 1 bolus; Site: left forearm;                     lp1 

23:30 Follow up: IV Status: Completed infusion; IV Intake: 1000ml                             vg1 

22:53 CANCELLED (Medicaton unavailable. Provider notified): Albuterol HFA Inhaler 2 puffs     vg1 

      Inhalation once                                                                             

22:53 Drug: LevaQUIN 500 mg Volume: 100 ml; Route: IVPB; Infused Over: 60 mins; Site: left    vg1 

      antecubital;                                                                                

23:50 Follow up: IV Status: Completed infusion; IV Intake: 100ml                              vg1 

                                                                                                  

                                                                                                  

Disposition:                                                                                      

                                                                                             

17:57 Co-signature as Attending Physician, Juan Rene MD I agree with the assessment and tw4 

      plan of care.                                                                               

                                                                                                  

Disposition:                                                                                      

21 23:19 Hospitalization ordered by Casey Ha for Inpatient Admission.               

  Preliminary diagnosis are Chronic obstructive pulmonary disease with acute                      

  lower respiratory infection, Hypoxemia.                                                         

- Bed requested for Telemetry/MedSurg (Inpatient).                                                

- Status is Inpatient Admission.                                                              dm5 

- Condition is Fair.                                                                              

- Problem is new.                                                                                 

- Symptoms have improved.                                                                         

                                                                                                  

                                                                                                  

                                                                                                  

Signatures:                                                                                       

Dispatcher [employer]                           EDMS                                                 

Aundrea Turner RN RN   dm5                                                  

Mary Santo RN                        JAVI                                                      

Rebeca Bashir RN                        JAVI   dw                                                   

Mohini Serra RN                         RN   lp1                                                  

Renzo Galindo PA PA cp Wadley, Terrence, MD MD   tw4                                                  

Jolie Velarde RN RN   vg1                                                  

Nehemiah Monae RN                       RN   ll1                                                  

                                                                                                  

Corrections: (The following items were deleted from the chart)                                    

                                                                                             

22:53 22:25 Albuterol HFA Inhaler 2 puffs Inhalation once ordered. cp                         vg1 

22:53 22:53 Albuterol HFA Inhaler 2 puffs Inhalation once ordered. [place] [id number] 

23:32 21:57 CORONAVIRUS+MR.LAB.BRZ ordered. [facility]

23:32 21:57 Influenza Screen (A \T\ B)+BA.LAB.BRZ ordered. [facility]

                                                                                             

00:27  23:17 Normal except: WBC 16.4; LEE% 81.6; LYM% 7.4; NEUT A 13.4. cp               chaitanya  

                                                                                             

01:01  23:19 Hospitalization Ordered by Casey Ha MD for Inpatient Admission.        

      Preliminary diagnosis is Chronic obstructive pulmonary disease with acute lower             

      respiratory infection; Hypoxemia. Bed requested for Telemetry/MedSurg (Inpatient).          

      Status is Inpatient Admission. Condition is Fair. Problem is new. Symptoms have             

      improved. cp                                                                                

                                                                                             

11:40 01:01 2021 23:19 Hospitalization Ordered by Casey Ha MD for Inpatient      dw  

      Admission. Preliminary diagnosis is Chronic obstructive pulmonary disease with acute        

      lower respiratory infection; Hypoxemia. Bed requested for [facility] ER HOLD. Status is           

      Inpatient Admission. Condition is Fair. Problem is new. Symptoms have improved. mw          

13:55 11:40 2021 23:19 Hospitalization Ordered by Casey Ha MD for Inpatient      dm5 

      Admission. Preliminary diagnosis is Chronic obstructive pulmonary disease with acute        

      lower respiratory infection; Hypoxemia. Bed requested for Telemetry/MedSurg                 

      (Inpatient). Status is Inpatient Admission. Condition is Fair. Problem is new. Symptoms     

      have improved. dw                                                                           

                                                                                                  

**************************************************************************************************

## 2021-01-03 NOTE — XMS REPORT
Continuity of Care Document

                           Created on:January 3, 2021



Patient:FLASH AGUILERA

Sex:Male

:1960

External Reference #:[id number]





Demographics







                          Address                   [address] [place] [id number]

 

                          Home Phone                [phone]

 

                          Mobile Phone              [phone]

 

                          Email Address             [email or web address]

[email or web address]

 

                          Preferred Language        English

 

                          Marital Status            Unknown

 

                          Shinto Affiliation     Unknown

 

                          Race                      Unknown

 

                          Additional Race(s)        Unavailable



                                                    White

 

                          Ethnic Group              Unknown









Author







                          Organization              [facility]

t

 

                          Address                   [address] [place] [address] [address]



                                                    [place] [address]

 

                          Phone                     [phone]









Support







                Name            Relationship    Address         Phone

 

                Contact         Other           Unavailable     [phone]









Care Team Providers







                    Name                Role                Phone

 

                    Ac CALDWELL,  T       Attending Clinician Unavailable

 

                    Lab,  Fam Pob I     Attending Clinician Unavailable









Problems

This patient has no known problems.



Allergies, Adverse Reactions, Alerts

This patient has no known allergies or adverse reactions.



Medications

This patient has no known medications.



Procedures

This patient has no known procedures.



Encounters







        Start   End     Encounter Admission Attending Care    Care    Encounter 

Source



        Date/Time Date/Time Type    Type    Clinicians Facility Department ID   

   

 

        2020 Letter          DEB Shen    [id number]

[id number] 



        00:00:00 00:00:00 (Out)           Madina DIXON   [id number]         



                                                \[facility] [place] [facility]\"" [id number]         



                                                        [phone]         



                                                        019             

 

        2020 Laboratory         Lab, [facility]    [id number]

[id number] 



        13:13:24 13:34:21 Only            Fam Pob I [facility]  [id number]         



                                                [place] [id number]         



                                                [place] [phone]         



                                                nal     044             



                                                Office                  



                                                Building                 



                                                One                     







Results

This patient has no known results.

## 2021-01-04 LAB
BUN BLD-MCNC: 11 MG/DL (ref 7–18)
COHGB MFR BLDA: 1.5 % (ref 0–1.5)
GLUCOSE SERPLBLD-MCNC: 183 MG/DL (ref 74–106)
HCT VFR BLD CALC: 40.3 % (ref 39.6–49)
LYMPHOCYTES # SPEC AUTO: 0.4 K/UL (ref 0.7–4.9)
MORPHOLOGY BLD-IMP: (no result)
OXYHGB MFR BLDA: 89.2 % (ref 94–97)
PMV BLD: 9.2 FL (ref 7.6–11.3)
POTASSIUM SERPL-SCNC: 4.1 MMOL/L (ref 3.5–5.1)
RBC # BLD: 4.62 M/UL (ref 4.33–5.43)
SAO2 % BLDA: 91.3 % (ref 92–98.5)
SARS-COV-2 RNA RESP QL NAA+PROBE: NEGATIVE

## 2021-01-04 RX ADMIN — Medication SCH ML: at 20:52

## 2021-01-04 RX ADMIN — Medication SCH ML: at 11:06

## 2021-01-04 RX ADMIN — ENOXAPARIN SODIUM SCH: 40 INJECTION SUBCUTANEOUS at 09:00

## 2021-01-04 RX ADMIN — METHYLPREDNISOLONE SODIUM SUCCINATE SCH MG: 40 INJECTION, POWDER, FOR SOLUTION INTRAMUSCULAR; INTRAVENOUS at 03:30

## 2021-01-04 RX ADMIN — ARFORMOTEROL TARTRATE SCH: 15 SOLUTION RESPIRATORY (INHALATION) at 08:00

## 2021-01-04 RX ADMIN — SODIUM CHLORIDE SCH MLS: 9 INJECTION, SOLUTION INTRAVENOUS at 17:15

## 2021-01-04 RX ADMIN — DOXYCYCLINE SCH MG: 100 CAPSULE ORAL at 20:52

## 2021-01-04 RX ADMIN — IPRATROPIUM BROMIDE SCH MG: 0.5 SOLUTION RESPIRATORY (INHALATION) at 08:00

## 2021-01-04 RX ADMIN — SODIUM CHLORIDE SCH MG: 9 INJECTION, SOLUTION INTRAVENOUS at 03:30

## 2021-01-04 RX ADMIN — IPRATROPIUM BROMIDE SCH: 0.5 SOLUTION RESPIRATORY (INHALATION) at 12:00

## 2021-01-04 RX ADMIN — METHYLPREDNISOLONE SODIUM SUCCINATE SCH MG: 40 INJECTION, POWDER, FOR SOLUTION INTRAMUSCULAR; INTRAVENOUS at 17:16

## 2021-01-04 RX ADMIN — SODIUM CHLORIDE SCH MLS: 9 INJECTION, SOLUTION INTRAVENOUS at 10:59

## 2021-01-04 RX ADMIN — METHYLPREDNISOLONE SODIUM SUCCINATE SCH MG: 40 INJECTION, POWDER, FOR SOLUTION INTRAMUSCULAR; INTRAVENOUS at 11:06

## 2021-01-04 RX ADMIN — ENOXAPARIN SODIUM SCH MG: 40 INJECTION SUBCUTANEOUS at 11:00

## 2021-01-04 RX ADMIN — DOXYCYCLINE SCH MG: 100 CAPSULE ORAL at 11:05

## 2021-01-04 RX ADMIN — IPRATROPIUM BROMIDE SCH MG: 0.5 SOLUTION RESPIRATORY (INHALATION) at 21:05

## 2021-01-04 RX ADMIN — IPRATROPIUM BROMIDE SCH MG: 0.5 SOLUTION RESPIRATORY (INHALATION) at 00:00

## 2021-01-04 RX ADMIN — ASPIRIN SCH MG: 81 TABLET, COATED ORAL at 11:05

## 2021-01-04 RX ADMIN — IPRATROPIUM BROMIDE SCH MG: 0.5 SOLUTION RESPIRATORY (INHALATION) at 04:00

## 2021-01-04 RX ADMIN — ARFORMOTEROL TARTRATE SCH MCG: 15 SOLUTION RESPIRATORY (INHALATION) at 21:05

## 2021-01-04 RX ADMIN — IPRATROPIUM BROMIDE SCH MG: 0.5 SOLUTION RESPIRATORY (INHALATION) at 15:02

## 2021-01-04 NOTE — RAD REPORT
EXAM DESCRIPTION:  RAD - Chest Single View - 1/3/2021 10:26 pm

 

CLINICAL HISTORY:  SOB

Chest pain.

 

COMPARISON:  Chest Single View dated 10/1/2020; Chest Pa And Lat (2 Views) dated 6/17/2019; Chest Sin
gle View dated 6/15/2019

 

FINDINGS:  Portable technique limits examination quality.

 

Advanced COPD is present with areas of scarring in both lung apices. The heart is normal in size. No 
displaced fractures.

 

IMPRESSION:  Advanced COPD.

## 2021-01-04 NOTE — EKG
Test Date:    2021-01-03               Test Time:    21:55:09

Technician:   LINDA                                   

                                                     

MEASUREMENT RESULTS:                                       

Intervals:                                           

Rate:         116                                    

MA:           134                                    

QRSD:         122                                    

QT:           330                                    

QTc:          458                                    

Axis:                                                

P:            83                                     

MA:           134                                    

QRS:          145                                    

T:            73                                     

                                                     

INTERPRETIVE STATEMENTS:                                       

                                                     

Sinus tachycardia

Right bundle branch block

Left posterior fascicular block

*** Bifascicular block ***

Abnormal ECG

Compared to ECG 10/01/2020 21:56:09

Sinus rhythm no longer present

Bifascicular block still present



Electronically Signed On 01-04-21 10:27:48 CST by Jermaine Luque

## 2021-01-04 NOTE — P.PN
Subjective


Date of Service: 01/04/21


Chief Complaint: COPD exacerbation


Subjective: Improving





Physical Examination





- Vital Signs


Temperature: 97.8 F


Blood Pressure: 111/65


Pulse: 91


Respirations: 18


Pulse Ox (%): 95





- Physical Exam


General: Alert, In no apparent distress, Oriented x3, Cooperative


HEENT: Atraumatic


Neck: Supple


Respiratory: Expiratory wheezes, Inspiratory wheezes


Cardiovascular: Normal pulses, Regular rate/rhythm


Gastrointestinal: Normal bowel sounds, Soft and benign, Non-distended, No 

tenderness, No masses, No rebound, No guarding


Neurological: Normal speech, Normal strength at 5/5 x4 extr, Normal tone, Normal

affect





- Studies


Laboratory Data (last 24 hrs)





01/04/21 02:54: Sodium 139, Potassium 4.1, BUN 11, Creatinine 0.83, Glucose 183 

H


01/04/21 02:54: WBC 13.4 H D, Hgb 13.3 L, Hct 40.3, Plt Count 333


01/03/21 22:20: PT 11.7, INR 0.99


01/03/21 22:20: WBC 16.4 H, Hgb 14.3, Hct 43.4, Plt Count 350


01/03/21 22:20: Sodium 138, Potassium 3.7, BUN 10, Creatinine 0.90, Glucose 130 

H, Magnesium 2.2, Total Bilirubin 0.5, AST 12 L, ALT 14, Alkaline Phosphatase 68





Medications List Reviewed: Yes





Assessment & Plan


Discharge Plan: Home


Plan to discharge in: 24 Hours


Physician Review Additional Text: 





Impression:


Dyspnea secondary to COPD exacerbation with advanced COPD


History of Pseudomonas in the sputum





Plan:


Patient improved.  Case discussed with pulmonology who the patient sees.  Will 

treat COPD exacerbation with IV steroids.  Continue COPD medication.  Sputum 

culture to be obtained.  Pulmonology wants to rule out Pseudomonas in the sputum

before discharge. Patient being covered with IV broad-spectrum antibiotics 

including doxycycline and meropenem.  Possible discharge as early as tomorrow if

significantly improved.


Time Spent Managing Pts Care (In Minutes): 55

## 2021-01-05 RX ADMIN — DOXYCYCLINE SCH MG: 100 CAPSULE ORAL at 19:56

## 2021-01-05 RX ADMIN — METHYLPREDNISOLONE SODIUM SUCCINATE SCH MG: 40 INJECTION, POWDER, FOR SOLUTION INTRAMUSCULAR; INTRAVENOUS at 08:22

## 2021-01-05 RX ADMIN — METHYLPREDNISOLONE SODIUM SUCCINATE SCH MG: 40 INJECTION, POWDER, FOR SOLUTION INTRAMUSCULAR; INTRAVENOUS at 01:00

## 2021-01-05 RX ADMIN — Medication SCH ML: at 19:57

## 2021-01-05 RX ADMIN — ASPIRIN SCH MG: 81 TABLET, COATED ORAL at 08:21

## 2021-01-05 RX ADMIN — IPRATROPIUM BROMIDE SCH MG: 0.5 SOLUTION RESPIRATORY (INHALATION) at 08:45

## 2021-01-05 RX ADMIN — ENOXAPARIN SODIUM SCH: 40 INJECTION SUBCUTANEOUS at 08:23

## 2021-01-05 RX ADMIN — SODIUM CHLORIDE SCH MLS: 9 INJECTION, SOLUTION INTRAVENOUS at 01:00

## 2021-01-05 RX ADMIN — IPRATROPIUM BROMIDE SCH MG: 0.5 SOLUTION RESPIRATORY (INHALATION) at 19:45

## 2021-01-05 RX ADMIN — ENOXAPARIN SODIUM SCH MG: 40 INJECTION SUBCUTANEOUS at 08:21

## 2021-01-05 RX ADMIN — ALBUTEROL SULFATE PRN MG: 2.5 SOLUTION RESPIRATORY (INHALATION) at 04:45

## 2021-01-05 RX ADMIN — ARFORMOTEROL TARTRATE SCH MCG: 15 SOLUTION RESPIRATORY (INHALATION) at 08:45

## 2021-01-05 RX ADMIN — DOXYCYCLINE SCH MG: 100 CAPSULE ORAL at 08:22

## 2021-01-05 RX ADMIN — IPRATROPIUM BROMIDE SCH MG: 0.5 SOLUTION RESPIRATORY (INHALATION) at 01:30

## 2021-01-05 RX ADMIN — SODIUM CHLORIDE SCH MLS: 9 INJECTION, SOLUTION INTRAVENOUS at 16:30

## 2021-01-05 RX ADMIN — ARFORMOTEROL TARTRATE SCH MCG: 15 SOLUTION RESPIRATORY (INHALATION) at 19:45

## 2021-01-05 RX ADMIN — Medication SCH ML: at 08:22

## 2021-01-05 RX ADMIN — IPRATROPIUM BROMIDE SCH MG: 0.5 SOLUTION RESPIRATORY (INHALATION) at 12:35

## 2021-01-05 RX ADMIN — ALBUTEROL SULFATE PRN MG: 2.5 SOLUTION RESPIRATORY (INHALATION) at 19:45

## 2021-01-05 RX ADMIN — IPRATROPIUM BROMIDE SCH MG: 0.5 SOLUTION RESPIRATORY (INHALATION) at 04:45

## 2021-01-05 RX ADMIN — ALBUTEROL SULFATE PRN MG: 2.5 SOLUTION RESPIRATORY (INHALATION) at 01:30

## 2021-01-05 RX ADMIN — SODIUM CHLORIDE SCH MLS: 9 INJECTION, SOLUTION INTRAVENOUS at 08:22

## 2021-01-05 RX ADMIN — IPRATROPIUM BROMIDE SCH MG: 0.5 SOLUTION RESPIRATORY (INHALATION) at 15:45

## 2021-01-05 NOTE — P.PN
Subjective


Date of Service: 01/05/21


Chief Complaint: COPD exacerbation


Subjective: Improving





Physical Examination





- Vital Signs


Temperature: 97.6 F


Blood Pressure: 94/55


Pulse: 88


Respirations: 20


Pulse Ox (%): 98





- Physical Exam


General: Alert, In no apparent distress, Oriented x3, Cooperative


HEENT: Atraumatic


Neck: Supple


Respiratory: Expiratory wheezes, Inspiratory wheezes


Cardiovascular: Normal pulses, Regular rate/rhythm


Neurological: Normal speech, Normal strength at 5/5 x4 extr, Normal tone, Normal

affect





- Studies


Medications List Reviewed: Yes





Assessment & Plan


Discharge Plan: Home


Plan to discharge in: 48 Hours


Physician Review Additional Text: 





Impression:


Dyspnea secondary to COPD exacerbation with advanced COPD


History of Pseudomonas in the sputum





Plan:


Patient improved.  Patient on 2 L per nasal cannula.  Case discussed with 

pulmonology.  Will continue with antibiotic treatment.  Awaiting sputum culture 

results as the patient had Pseudomonas in the past.  If negative patient can be 

discharge with Levaquin.  If positive will need to wait on culture results.  

Continue monitor closely.  Continue COPD treatment.  Anticipate improvement over

the next 48 hr. 


Time Spent Managing Pts Care (In Minutes): 55

## 2021-01-05 NOTE — P.PN
Subjective


Date of Service: 01/05/21


Chief Complaint: COPD exacerbation


Subjective: Improving (Doing better chest congestion has improved sputum 

cultures pending)





Review of Systems


General: Weakness


Respiratory: Cough, Shortness of Breath





Physical Examination





- Vital Signs


Temperature: 97.6 F


Blood Pressure: 94/55


Pulse: 88


Respirations: 20


Pulse Ox (%): 98





- Physical Exam


General: Alert, Mild distress


Respiratory: Expiratory wheezes


Cardiovascular: No edema, Regular rate/rhythm





- Studies


Medications List Reviewed: Yes





Assessment & Plan





- Problems (Diagnosis)


(1) COPD exacerbation


Current Visit: No   Status: Acute   


Plan: 


Patient admitted with exacerbation he has had recurrent exacerbations with 

previous Pseudomonas infection he is doing much better patient's white count is 

declining no evidence of pneumonia patient's CRP is elevated await for culture 

results prior to discharge

## 2021-01-06 LAB
BUN BLD-MCNC: 17 MG/DL (ref 7–18)
GLUCOSE SERPLBLD-MCNC: 159 MG/DL (ref 74–106)
HCT VFR BLD CALC: 42.4 % (ref 39.6–49)
LYMPHOCYTES # SPEC AUTO: 1.3 K/UL (ref 0.7–4.9)
PMV BLD: 8.8 FL (ref 7.6–11.3)
POTASSIUM SERPL-SCNC: 4 MMOL/L (ref 3.5–5.1)
RBC # BLD: 4.76 M/UL (ref 4.33–5.43)

## 2021-01-06 RX ADMIN — Medication SCH ML: at 09:47

## 2021-01-06 RX ADMIN — ALBUTEROL SULFATE PRN MG: 2.5 SOLUTION RESPIRATORY (INHALATION) at 09:10

## 2021-01-06 RX ADMIN — DOXYCYCLINE SCH MG: 100 CAPSULE ORAL at 21:00

## 2021-01-06 RX ADMIN — ENOXAPARIN SODIUM SCH: 40 INJECTION SUBCUTANEOUS at 09:00

## 2021-01-06 RX ADMIN — Medication SCH MG: at 09:48

## 2021-01-06 RX ADMIN — SODIUM CHLORIDE SCH MLS: 9 INJECTION, SOLUTION INTRAVENOUS at 00:29

## 2021-01-06 RX ADMIN — Medication SCH ML: at 21:10

## 2021-01-06 RX ADMIN — ALBUTEROL SULFATE PRN MG: 2.5 SOLUTION RESPIRATORY (INHALATION) at 01:40

## 2021-01-06 RX ADMIN — SODIUM CHLORIDE SCH MLS: 9 INJECTION, SOLUTION INTRAVENOUS at 16:22

## 2021-01-06 RX ADMIN — IPRATROPIUM BROMIDE SCH MG: 0.5 SOLUTION RESPIRATORY (INHALATION) at 01:40

## 2021-01-06 RX ADMIN — SODIUM CHLORIDE SCH MLS: 9 INJECTION, SOLUTION INTRAVENOUS at 09:47

## 2021-01-06 RX ADMIN — ENOXAPARIN SODIUM SCH MG: 40 INJECTION SUBCUTANEOUS at 09:47

## 2021-01-06 RX ADMIN — DOXYCYCLINE SCH MG: 100 CAPSULE ORAL at 09:00

## 2021-01-06 RX ADMIN — IPRATROPIUM BROMIDE SCH MG: 0.5 SOLUTION RESPIRATORY (INHALATION) at 14:39

## 2021-01-06 RX ADMIN — FOLIC ACID SCH MG: 1 TABLET ORAL at 09:47

## 2021-01-06 RX ADMIN — ARFORMOTEROL TARTRATE SCH MCG: 15 SOLUTION RESPIRATORY (INHALATION) at 09:10

## 2021-01-06 RX ADMIN — IPRATROPIUM BROMIDE SCH MG: 0.5 SOLUTION RESPIRATORY (INHALATION) at 09:10

## 2021-01-06 RX ADMIN — ARFORMOTEROL TARTRATE SCH MCG: 15 SOLUTION RESPIRATORY (INHALATION) at 20:30

## 2021-01-06 RX ADMIN — ASPIRIN SCH MG: 81 TABLET, COATED ORAL at 09:46

## 2021-01-06 RX ADMIN — IPRATROPIUM BROMIDE SCH MG: 0.5 SOLUTION RESPIRATORY (INHALATION) at 20:30

## 2021-01-06 NOTE — P.PN
Subjective


Date of Service: 01/06/21


Chief Complaint: COPD exacerbation


Subjective: Improving





Physical Examination





- Vital Signs


Temperature: 97.9 F


Blood Pressure: 97/50


Pulse: 90


Respirations: 18


Pulse Ox (%): 98





- Physical Exam


General: Alert, In no apparent distress, Oriented x3, Cooperative


HEENT: Atraumatic


Neck: Supple


Respiratory: Expiratory wheezes


Cardiovascular: Normal pulses, Regular rate/rhythm


Gastrointestinal: Normal bowel sounds, No masses, No rebound, No guarding


Neurological: Normal speech, Normal strength at 5/5 x4 extr, Normal tone, Normal

affect





- Studies


Medications List Reviewed: Yes





Assessment & Plan


Discharge Plan: Home


Plan to discharge in: 24 Hours


Physician Review Additional Text: 





Impression:


Dyspnea secondary to COPD exacerbation with advanced COPD


History of Pseudomonas in the sputum





Plan:


Patient overall stable.  Patient remains on 2 L per nasal cannula.  Will switch 

over to oral steroid.  Await sputum culture result.  Patient with history of 

Pseudomonas in the sputum.  Pulmonology wants to wait on results prior to 

discharge. Will check to see when this will be resulted.  Continue with current 

treatment at this time.  Anticipate possible discharge tomorrow or sooner if 

results come back.  Will discuss with pulmonology.  Patient will need a continue

with home oxygen at discharge.


Time Spent Managing Pts Care (In Minutes): 55

## 2021-01-07 VITALS — TEMPERATURE: 98.3 F

## 2021-01-07 VITALS — OXYGEN SATURATION: 94 %

## 2021-01-07 VITALS — SYSTOLIC BLOOD PRESSURE: 124 MMHG | DIASTOLIC BLOOD PRESSURE: 68 MMHG

## 2021-01-07 LAB
BUN BLD-MCNC: 13 MG/DL (ref 7–18)
GLUCOSE SERPLBLD-MCNC: 101 MG/DL (ref 74–106)
HCT VFR BLD CALC: 40 % (ref 39.6–49)
LYMPHOCYTES # SPEC AUTO: 1.3 K/UL (ref 0.7–4.9)
PMV BLD: 9 FL (ref 7.6–11.3)
POTASSIUM SERPL-SCNC: 4.3 MMOL/L (ref 3.5–5.1)
RBC # BLD: 4.5 M/UL (ref 4.33–5.43)

## 2021-01-07 RX ADMIN — SODIUM CHLORIDE SCH MLS: 9 INJECTION, SOLUTION INTRAVENOUS at 01:12

## 2021-01-07 RX ADMIN — ASPIRIN SCH MG: 81 TABLET, COATED ORAL at 10:59

## 2021-01-07 RX ADMIN — ARFORMOTEROL TARTRATE SCH MCG: 15 SOLUTION RESPIRATORY (INHALATION) at 10:00

## 2021-01-07 RX ADMIN — IPRATROPIUM BROMIDE SCH MG: 0.5 SOLUTION RESPIRATORY (INHALATION) at 10:00

## 2021-01-07 RX ADMIN — Medication SCH MG: at 10:59

## 2021-01-07 RX ADMIN — IPRATROPIUM BROMIDE SCH MG: 0.5 SOLUTION RESPIRATORY (INHALATION) at 02:50

## 2021-01-07 RX ADMIN — FOLIC ACID SCH MG: 1 TABLET ORAL at 10:59

## 2021-01-07 RX ADMIN — DOXYCYCLINE SCH MG: 100 CAPSULE ORAL at 09:00

## 2021-01-07 RX ADMIN — Medication SCH: at 09:00

## 2021-01-07 RX ADMIN — ENOXAPARIN SODIUM SCH: 40 INJECTION SUBCUTANEOUS at 09:00

## 2021-01-07 RX ADMIN — SODIUM CHLORIDE SCH: 9 INJECTION, SOLUTION INTRAVENOUS at 09:00

## 2021-01-07 NOTE — P.DS
Admission Date: 01/04/21


Discharge Date: 01/07/21


Primary Care Provider: None; Pulmonary-Dr. Ha


Disposition: ROUTINE DISCHARGE


Discharge Condition: GOOD


Reason for Admission: COPD exacerbation


Consultations: 





Pulmonary-Dr. Ha





Procedures: 





COVID/Influenza A/B:


Negative





Sputum:


Normal felicia





CXR: 


COMPARISON:  Chest Single View dated 10/1/2020; Chest Pa And Lat (2 Views) dated

6/17/2019; Chest Single View dated 6/15/2019 


FINDINGS:  Portable technique limits examination quality. 


Advanced COPD is present with areas of scarring in both lung apices. The heart 

is normal in size. No displaced fractures. 


IMPRESSION:  Advanced COPD.





Medical Problem List: 


Dyspnea secondary to COPD exacerbation with advanced COPD on chronic oxygen and 

steroid


History of Pseudomonas in the sputum without Pseudomonas on repeat





Brief History of Present Illness: 





60-year-old  male with history of advanced COPD presented to the 

emergency room with shortness of breath.  Patient found to have COPD 

exacerbation.  Patient was admitted for treatment.


Hospital Course: 





Patient presented with COPD exacerbation.  Patient was seen by pulmonology.  

Patient was given IV Solu-Medrol and oxygen.  Patient with history of 

Pseudomonas in the sputum.  Pulmonology rechecked sputum.  Normal  for felicia 

noted. He was COVID and Influenza negative.  No evidence of pneumonia noted. 

Patient improved.  At discharge patient will continue with oxygen to maintain 

sats above 93%.  Patient currently on 3 L per nasal cannula.  At discharge 

patient will continue with his COPD medication including Trelegy 1 puff daily, 

Atrovent 1 unit dose 3 times a day as needed for shortness of breath and 

albuterol 1 unit dose 3 times a day as needed for shortness of breath.  The 

patient will continue with prednisone 20 mg 1 pill twice daily for 7 days then 

20 mg daily for 7 days then he will continue with his regular regimen of 10 mg 

daily.  Pulmonology also recommended to continue Levaquin 750 mg daily and 

doxycycline 100 mg 1 pill twice daily for 7 days.  Recommend follow up with 

pulmonology in 1-2 weeks to monitor his progress.  COPD education provided.


Vital Signs/Physical Exam: 














Temp Pulse Resp BP Pulse Ox


 


 98.3 F   82   17   110/58 L  97 


 


 01/07/21 08:00  01/07/21 08:00  01/07/21 08:00  01/07/21 08:00  01/07/21 08:00








General: Alert, In no apparent distress, Oriented x3, Cooperative


HEENT: Atraumatic


Neck: Supple


Respiratory: Expiratory wheezes


Cardiovascular: Normal pulses, Regular rate/rhythm


Gastrointestinal: Normal bowel sounds, No tenderness, No masses, No rebound, No 

guarding


Musculoskeletal: No erythema, No tenderness, No warmth


Integumentary: No erythema, No warmth, No cyanosis


Neurological: Normal speech, Normal strength at 5/5 x4 extr, Normal tone, Normal

affect


Laboratory Data at Discharge: 














WBC  11.7 K/uL (4.3-10.9)  H D 01/07/21  05:45    


 


Hgb  13.0 g/dL (13.6-17.9)  L  01/07/21  05:45    


 


Hct  40.0 % (39.6-49.0)   01/07/21  05:45    


 


Plt Count  363 K/uL (152-406)   01/07/21  05:45    


 


PT  11.7 SECONDS (9.5-12.5)   01/03/21  22:20    


 


INR  0.99   01/03/21  22:20    


 


Sodium  143 mmol/L (136-145)   01/07/21  05:45    


 


Potassium  4.3 mmol/L (3.5-5.1)   01/07/21  05:45    


 


BUN  13 mg/dL (7-18)   01/07/21  05:45    


 


Creatinine  0.63 mg/dL (0.55-1.3)   01/07/21  05:45    


 


Glucose  101 mg/dL ()   01/07/21  05:45    


 


Magnesium  2.2 mg/dL (1.8-2.4)   01/03/21  22:20    


 


Total Bilirubin  0.5 mg/dL (0.2-1.0)   01/03/21  22:20    


 


AST  12 U/L (15-37)  L  01/03/21  22:20    


 


ALT  14 U/L (12-78)   01/03/21  22:20    


 


Alkaline Phosphatase  68 U/L ()   01/03/21  22:20    








Home Medications: 








predniSONE [Deltasone*] 1 tab PO DAILY 10/02/20 


Albuterol Neb [Proventil 0.083% Neb Soln] 2.5 mg IH QID 01/04/21 


Fluticasone/Umeclidin/Vilanter [Trelegy Ellipta 100-62.5-25] 1 each IH DAILY 

01/04/21 


Ipratropium Neb [Atrovent*] 0.2 mg IH QID 01/04/21 


Doxycycline Hyclate 100 mg PO BID #14 tablet 01/07/21 


levoFLOXacin [Levaquin] 750 mg PO DAILY #7 tab 01/07/21 


predniSONE [Prednisone*] 20 mg PO SEECOM #21 tab 01/07/21 





New Medications: 


Doxycycline Hyclate 100 mg PO BID #14 tablet


levoFLOXacin [Levaquin] 750 mg PO DAILY #7 tab


predniSONE [Prednisone*] 20 mg PO SEECOM #21 tab


Patient Discharge Instructions: 1.  Follow up with PCP in 1 week to follow up 

this hospitalization.  2.  Patient presented with COPD exacerbation.  Patient 

was seen by pulmonology.  Patient was given IV Solu-Medrol and oxygen.  Patient 

with history of Pseudomonas in the sputum.  Pulmonology rechecked sputum.  

Normal  for felicia noted. He was COVID and Influenza negative.  No evidence of 

pneumonia noted. Patient improved.  At discharge patient will continue with 

oxygen to maintain sats above 93%.  Patient currently on 3 L per nasal cannula. 

 At discharge patient will continue with his COPD medication including Trelegy 1

 puff daily, Atrovent 1 unit dose 3 times a day as needed for shortness of 

breath and albuterol 1 unit dose 3 times a day as needed for shortness of 

breath.  The patient will continue with prednisone 20 mg 1 pill twice daily for 

7 days then 20 mg daily for 7 days then he will continue with his regular 

regimen of 10 mg daily.  Pulmonology also recommended to continue Levaquin 750 

mg daily and doxycycline 100 mg 1 pill twice daily for 7 days.  Recommend follow

 up with pulmonology in 1-2 weeks to monitor his progress.  COPD education 

provided.


Diet: Regular


Activity: Ad radha


Followup: 


Unknown,U [Primary Care Provider] - 


Time spent managing pt's care (in minutes): 55

## 2021-06-06 ENCOUNTER — HOSPITAL ENCOUNTER (OUTPATIENT)
Dept: HOSPITAL 97 - ER | Age: 61
Setting detail: OBSERVATION
LOS: 1 days | Discharge: HOME | End: 2021-06-07
Attending: FAMILY MEDICINE | Admitting: HOSPITALIST
Payer: COMMERCIAL

## 2021-06-06 VITALS — BODY MASS INDEX: 23 KG/M2

## 2021-06-06 DIAGNOSIS — Z79.52: ICD-10-CM

## 2021-06-06 DIAGNOSIS — Z87.891: ICD-10-CM

## 2021-06-06 DIAGNOSIS — J47.1: Primary | ICD-10-CM

## 2021-06-06 DIAGNOSIS — Z20.822: ICD-10-CM

## 2021-06-06 DIAGNOSIS — Z99.81: ICD-10-CM

## 2021-06-06 LAB
ALBUMIN SERPL BCP-MCNC: 3.4 G/DL (ref 3.4–5)
ALP SERPL-CCNC: 57 U/L (ref 45–117)
ALT SERPL W P-5'-P-CCNC: 18 U/L (ref 12–78)
AMYLASE SERPL-CCNC: 27 U/L (ref 25–115)
AST SERPL W P-5'-P-CCNC: 13 U/L (ref 15–37)
BLD SMEAR INTERP: (no result)
BUN BLD-MCNC: 10 MG/DL (ref 7–18)
CKMB CREATINE KINASE MB: < 1 NG/ML (ref 1–3.6)
GLUCOSE SERPLBLD-MCNC: 192 MG/DL (ref 74–106)
HCT VFR BLD CALC: 45.2 % (ref 39.6–49)
INR BLD: 1.3
LIPASE SERPL-CCNC: 17 U/L (ref 73–393)
LYMPHOCYTES # SPEC AUTO: 0.7 K/UL (ref 0.7–4.9)
MORPHOLOGY BLD-IMP: (no result)
PMV BLD: 9.6 FL (ref 7.6–11.3)
POTASSIUM SERPL-SCNC: 3.9 MMOL/L (ref 3.5–5.1)
RBC # BLD: 5.13 M/UL (ref 4.33–5.43)
TROPONIN I: < 0.02 NG/ML (ref 0–0.04)

## 2021-06-06 PROCEDURE — 82550 ASSAY OF CK (CPK): CPT

## 2021-06-06 PROCEDURE — 80061 LIPID PANEL: CPT

## 2021-06-06 PROCEDURE — 85730 THROMBOPLASTIN TIME PARTIAL: CPT

## 2021-06-06 PROCEDURE — 82553 CREATINE MB FRACTION: CPT

## 2021-06-06 PROCEDURE — 94760 N-INVAS EAR/PLS OXIMETRY 1: CPT

## 2021-06-06 PROCEDURE — 94640 AIRWAY INHALATION TREATMENT: CPT

## 2021-06-06 PROCEDURE — 83735 ASSAY OF MAGNESIUM: CPT

## 2021-06-06 PROCEDURE — 84100 ASSAY OF PHOSPHORUS: CPT

## 2021-06-06 PROCEDURE — 80048 BASIC METABOLIC PNL TOTAL CA: CPT

## 2021-06-06 PROCEDURE — 87804 INFLUENZA ASSAY W/OPTIC: CPT

## 2021-06-06 PROCEDURE — 71045 X-RAY EXAM CHEST 1 VIEW: CPT

## 2021-06-06 PROCEDURE — 96372 THER/PROPH/DIAG INJ SC/IM: CPT

## 2021-06-06 PROCEDURE — 85610 PROTHROMBIN TIME: CPT

## 2021-06-06 PROCEDURE — 85025 COMPLETE CBC W/AUTO DIFF WBC: CPT

## 2021-06-06 PROCEDURE — 80076 HEPATIC FUNCTION PANEL: CPT

## 2021-06-06 PROCEDURE — 87040 BLOOD CULTURE FOR BACTERIA: CPT

## 2021-06-06 PROCEDURE — 84145 PROCALCITONIN (PCT): CPT

## 2021-06-06 PROCEDURE — 83605 ASSAY OF LACTIC ACID: CPT

## 2021-06-06 PROCEDURE — 83690 ASSAY OF LIPASE: CPT

## 2021-06-06 PROCEDURE — 36415 COLL VENOUS BLD VENIPUNCTURE: CPT

## 2021-06-06 PROCEDURE — 87205 SMEAR GRAM STAIN: CPT

## 2021-06-06 PROCEDURE — 87070 CULTURE OTHR SPECIMN AEROBIC: CPT

## 2021-06-06 PROCEDURE — 84484 ASSAY OF TROPONIN QUANT: CPT

## 2021-06-06 PROCEDURE — 99285 EMERGENCY DEPT VISIT HI MDM: CPT

## 2021-06-06 PROCEDURE — 93005 ELECTROCARDIOGRAM TRACING: CPT

## 2021-06-06 PROCEDURE — 71275 CT ANGIOGRAPHY CHEST: CPT

## 2021-06-06 PROCEDURE — 87081 CULTURE SCREEN ONLY: CPT

## 2021-06-06 PROCEDURE — 82150 ASSAY OF AMYLASE: CPT

## 2021-06-06 RX ADMIN — IPRATROPIUM BROMIDE SCH MG: 0.5 SOLUTION RESPIRATORY (INHALATION) at 20:45

## 2021-06-06 RX ADMIN — Medication SCH ML: at 21:26

## 2021-06-06 RX ADMIN — ALBUTEROL SULFATE SCH MG: 2.5 SOLUTION RESPIRATORY (INHALATION) at 20:45

## 2021-06-06 NOTE — RAD REPORT
EXAM DESCRIPTION:  [facility] Single View6/6/2021 1:26 pm

 

CLINICAL HISTORY:  Fever

 

COMPARISON:  January 2021

 

FINDINGS:  Development of a mild right upper lobe opacities.

 

Additional bilateral interstitial lung opacities chronic. Lungs are hyperaerated. Heart is normal siz
e. Right upper lobe bulla

 

IMPRESSION:  Development of mild right upper lobe opacities pneumonia

## 2021-06-06 NOTE — ER
Nurse's Notes                                                                                     

 [facility]                                                                 

Name: Jaxon Taylor                                                                             

Age: 61 yrs                                                                                       

Sex: Male                                                                                         

: 1960                                                                                   

MRN: [id number]                                                                                   

Arrival Date: 2021                                                                          

Time: 12:18                                                                                       

Account#: [id number]                                                                            

Bed 3                                                                                             

Private MD:                                                                                       

Diagnosis: Pneumonia, unspecified organism                                                        

                                                                                                  

Presentation:                                                                                     

                                                                                             

12:23 Chief complaint: Patient states: right sided chest pain, fever up to 104.0*F,           aa5 

      productive cough since Thursday. Pt also reports vomiting.                                  

12:23 Coronavirus screen: cough unrelated to allergies, shortness of breath. Ebola Screen:    aa5 

      Patient negative for fever greater than or equal to 101.5 degrees Fahrenheit, and           

      additional compatible Ebola Virus Disease symptoms. Initial Sepsis Screen: Does the         

      patient meet any 2 criteria? RR > 20 per min. Temp <36.0*C (96.8*F)) or > 38.3*C            

      (100.9*F). HR > 90 bpm. Yes Does the patient have a suspected source of infection? Yes:     

      Productive cough/pneumonia. Risk Assessment: Do you want to hurt yourself or someone        

      else? Patient reports no desire to harm self or others. Onset of symptoms was 2021.    

12:23 Acuity: FROILAN 2                                                                           aa5 

12:23 Method Of Arrival: Wheelchair                                                           aa5 

                                                                                                  

Triage Assessment:                                                                                

12:30 General: Appears distressed, uncomfortable, ill, Behavior is cooperative, appropriate   bp  

      for age, anxious. Pain: Complains of pain in chest. EENT: No deficits noted. Neuro:         

      Level of Consciousness is awake, alert, obeys commands, Oriented to Appropriate for         

      age. Cardiovascular: Rhythm is sinus tachycardia. Respiratory: Airway is patent             

      Respiratory effort is labored, Breath sounds with crackles Breath sounds with rales.        

      GI: Reports nausea. : No signs and/or symptoms were reported regarding the                

      genitourinary system. Derm: No deficits noted. Musculoskeletal: No deficits noted.          

                                                                                                  

Historical:                                                                                       

- Allergies:                                                                                      

12:38 No Known Allergies;                                                                     aa5 

- PMHx:                                                                                           

12:38 COPD; Emphysema;                                                                        aa5 

- PSHx:                                                                                           

12:38 Mastoidectomy; Appendectomy;                                                            aa5 

                                                                                                  

- Immunization history:: Adult Immunizations up to date.                                          

- Social history:: Smoking status: Patient reports the use of cigarette tobacco                   

  products, unknown amount.                                                                       

                                                                                                  

                                                                                                  

Screenin:45 Abuse screen: Denies threats or abuse. Denies injuries from another. Nutritional        bp  

      screening: No deficits noted. Tuberculosis screening: No symptoms or risk factors           

      identified. Fall Risk None identified.                                                      

                                                                                                  

Assessment:                                                                                       

12:45 General: SEE TRIAGE NOTE.                                                               bp  

13:00 Reassessment: PT VOMITING. LMP INFORMED.                                                bp  

14:10 Reassessment: Patient and/or family updated on plan of care and expected duration. Pain bp  

      level reassessed. Patient is alert, oriented x 3, equal unlabored respirations, skin        

      warm/dry/pink. Patient states symptoms have improved.                                       

16:00 Reassessment: No changes from previously documented assessment. Patient and/or family   bp  

      updated on plan of care and expected duration. Pain level reassessed. Patient is alert,     

      oriented x 3, equal unlabored respirations, skin warm/dry/pink. ADMIT IN PROCESS.           

17:09 Reassessment: REPORT TO JANELLE CALDWELL FOR .                                           bp  

                                                                                                  

Vital Signs:                                                                                      

12:23  / 72; Pulse 117; Resp 32 S; Temp 101.9(O); Pulse Ox 94% on 6 lpm NC; Weight      aa5 

      74.84 kg (R); Height 5 ft. 11 in. (180.34 cm) (R); Pain 3/10;                               

14:00  / 74; Pulse 86; Resp 20; Pulse Ox 97% on 7 lpm NC;                               kg  

15:00  / 61; Pulse 104; Resp 17; Pulse Ox 96% ;                                         bp  

16:00  / 63; Pulse 97; Resp 20; Pulse Ox 97% ;                                          bp  

17:00 BP 91 / 52; Pulse 91; Resp 20; Temp 99.5; Pulse Ox 99% ;                                bp  

12:23 Body Mass Index 23.01 (74.84 kg, 180.34 cm)                                             aa5 

                                                                                                  

ED Course:                                                                                        

12:18 Patient arrived in ED.                                                                  ds1 

12:23 Arm band placed on.                                                                     aa5 

12:25 Alvarez Lennon NP is PHCP.                                                           pm1 

12:25 Renzo Ferreira MD is Attending Physician.                                             pm1 

12:26 Shayne Christopher, JAVI is Primary Nurse.                                                    bp  

12:37 Triage completed.                                                                       aa5 

12:37 EKG done, by ED staff, reviewed by Renzo Ferreira MD.                                   aa5 

12:45 Patient has correct armband on for positive identification. Bed in low position. Call   bp  

      light in reach. Side rails up X2. Adult w/ patient.                                         

13:00 Inserted saline lock: 20 gauge in left forearm, using aseptic technique. Blood          bp  

      collected.                                                                                  

13:26 Chest Single View In Process Unspecified.                                               EDMS

14:07 Naina Brand MD is Hospitalizing Provider.                                           pm1 

17:12 No provider procedures requiring assistance completed. Patient admitted, IV remains in  bp  

      place.                                                                                      

                                                                                                  

Administered Medications:                                                                         

13:00 Drug: Acetaminophen 1000 mg Route: PO;                                                  bp  

17:14 Follow up: Response: Temperature is decreased                                           bp  

13:20 Drug: NS 0.9% (30 ml/kg) 30 ml/kg Route: IV; Rate: bolus; Site: left antecubital;       bp  

17:15 Follow up: IV Status: Completed infusion; IV Intake: 2000ml                             bp  

13:20 Drug: Rocephin (cefTRIAXone) 50 mg/kg Route: IM; Site: affected area;                   bp  

17:14 Follow up: Response: No adverse reaction                                                bp  

13:20 Drug: Albuterol - atroVENT (ipratropium) (3:1) (2.5 mg - 0.5 mg) 3 ml Route: Nebulizer; bp  

17:14 Follow up: Response: Marked relief of symptoms                                          bp  

13:20 Drug: SOLU-Medrol (methylPrednisoLONE) 125 mg Route: IVP; Site: left forearm;           bp  

17:13 Follow up: Response: No adverse reaction                                                bp  

13:20 Drug: Zofran (Ondansetron) 4 mg Route: IVP; Site: left forearm;                         bp  

17:13 Follow up: Response: No adverse reaction                                                bp  

13:20 Drug: morphine 2 mg Route: IVP; Site: left forearm;                                     bp  

17:13 Follow up: Response: Pain is decreased                                                  bp  

14:30 Drug: AZITHromycin 500 mg Route: IVPB; Infused Over: 1 hrs; Site: left forearm;         bp  

17:13 Follow up: IV Status: Completed infusion; IV Intake: 250ml                              bp  

                                                                                                  

                                                                                                  

Intake:                                                                                           

17:13 IV: 250ml; Total: 250ml.                                                                bp  

17:15 IV: 2000ml; Total: 2250ml.                                                              bp  

                                                                                                  

Outcome:                                                                                          

14:07 Decision to Hospitalize by Provider.                                                    pm1 

17:12 Admitted to Med/surg accompanied by tech, via stretcher, room 404, with chart, Report   bp  

      called to  JANELLE CALDWELL                                                                        

17:12 Condition: stable                                                                           

17:12 Instructed on the need for admit.                                                           

18:14 Patient left the ED.                                                                    bp  

                                                                                                  

Signatures:                                                                                       

Dispatcher [employer]                           EDMS                                                 

GonzalesFoxi                                ds1                                                  

Anayeli Olivarez RN                     RN   aa5                                                  

Alvarez Lennon, ELLEN                    NP   pm1                                                  

Shayne Christopher RN                      RN   bp                                                   

Nathalia Monteiro RN                     RN   kg                                                   

                                                                                                  

**************************************************************************************************

## 2021-06-06 NOTE — EDPHYS
Physician Documentation                                                                           

 [facility]                                                                 

Name: Jaxon Taylor                                                                             

Age: 61 yrs                                                                                       

Sex: Male                                                                                         

: 1960                                                                                   

MRN: [id number]                                                                                   

Arrival Date: 2021                                                                          

Time: 12:18                                                                                       

Account#: [id number]                                                                            

Bed 3                                                                                             

Private MD:                                                                                       

ED Physician Renzo Ferreira                                                                      

HPI:                                                                                              

                                                                                             

12:39 This 61 yrs old  Male presents to ER via Wheelchair with complaints of Lung    pm1 

      Pain, Fever.                                                                                

12:39 The patient or guardian reports cough, with productive sputum, that is green,           pm1 

      difficulty breathing. Onset: The symptoms/episode began/occurred 3 day(s) ago. Severity     

      of symptoms: in the emergency department the symptoms are actually worse. Modifying         

      factors: The symptoms are alleviated by inhaler, albuterol. Associated signs and            

      symptoms: Pertinent positives: chest pain, with cough, with breathing, right rib cage       

      pain, fever, vomiting, Pertinent negatives: diarrhea, sore throat, abdominal pain. The      

      patient has experienced similar episodes in the past, a few times. The patient has not      

      recently seen a physician, the patient's primary care provider is Dr. Santos.                 

      Pulmonologist: Dr. Ha.                                                                

                                                                                                  

Historical:                                                                                       

- Allergies:                                                                                      

12:38 No Known Allergies;                                                                     aa5 

- PMHx:                                                                                           

12:38 COPD; Emphysema;                                                                        aa5 

- PSHx:                                                                                           

12:38 Mastoidectomy; Appendectomy;                                                            aa5 

                                                                                                  

- Immunization history:: Adult Immunizations up to date.                                          

- Social history:: Smoking status: Patient reports the use of cigarette tobacco                   

  products, unknown amount.                                                                       

                                                                                                  

                                                                                                  

ROS:                                                                                              

12:39 Eyes: Negative for injury, pain, redness, and discharge, ENT: Negative for injury,      pm1 

      pain, and discharge, Neck: Negative for injury, pain, and swelling.                         

12:39 Back: Negative for injury and pain, MS/Extremity: Negative for injury and deformity,        

      Skin: Negative for injury, rash, and discoloration, Neuro: Negative for headache,           

      weakness, numbness, tingling, and seizure.                                                  

12:39 Constitutional: Positive for body aches, chills, fever.                                     

12:39 Cardiovascular: Positive for chest pain, with cough, with movement, of the right            

      breast, Negative for edema, palpitations.                                                   

12:39 Respiratory: Positive for cough, shortness of breath.                                       

12:39 Abdomen/GI: Positive for nausea and vomiting, Negative for abdominal pain, diarrhea,        

      constipation.                                                                               

                                                                                                  

Exam:                                                                                             

12:39 Constitutional:  This is a well developed, well nourished patient who is awake, alert,  pm1 

      and in no acute distress. Head/Face:  Normocephalic, atraumatic.                            

12:39 Back:  No spinal tenderness.  No costovertebral tenderness.  Full range of motion.          

      Skin:  Warm, dry with normal turgor.  Normal color with no rashes, no lesions, and no       

      evidence of cellulitis. MS/ Extremity:  Pulses equal, no cyanosis.  Neurovascular           

      intact.  Full, normal range of motion.                                                      

12:39 Chest/axilla: Palpation: tenderness, that is mild, of the  right breast, that totally       

      reproduces the patient's complaints.                                                        

12:39 Cardiovascular: Rate: tachycardic, Rhythm: regular, Pulses: no pulse deficits are           

      appreciated.                                                                                

12:39 Respiratory: mild respiratory distress is noted,  Respirations: tachypnea, that is          

      mild, Breath sounds: rhonchi, that are mild, are heard diffusely.                           

12:39 Abdomen/GI: Inspection: abdomen appears normal, Palpation: abdomen is soft and              

      non-tender, in all quadrants.                                                               

12:39 Neuro: Exam negative for acute changes, Orientation: is normal, Mentation: is normal,       

      Motor: is normal, strength is normal, Sensation: is normal, no obvious gross deficits.      

                                                                                                  

Vital Signs:                                                                                      

12:23  / 72; Pulse 117; Resp 32 S; Temp 101.9(O); Pulse Ox 94% on 6 lpm NC; Weight      aa5 

      74.84 kg (R); Height 5 ft. 11 in. (180.34 cm) (R); Pain 3/10;                               

14:00  / 74; Pulse 86; Resp 20; Pulse Ox 97% on 7 lpm NC;                               kg  

15:00  / 61; Pulse 104; Resp 17; Pulse Ox 96% ;                                         bp  

16:00  / 63; Pulse 97; Resp 20; Pulse Ox 97% ;                                          bp  

17:00 BP 91 / 52; Pulse 91; Resp 20; Temp 99.5; Pulse Ox 99% ;                                bp  

12:23 Body Mass Index 23.01 (74.84 kg, 180.34 cm)                                             aa5 

                                                                                                  

MDM:                                                                                              

12:30 Patient medically screened.                                                             pm1 

12:46 Data interpreted: Pulse oximetry: on 6L(s) per nasal canula, is 94 %. Interpretation:   pm1 

      acceptable, Plan: will initiate a nebulizer treatment.                                      

13:53 Data reviewed: vital signs.                                                             pm1 

14:06 Counseling: I had a detailed discussion with the patient and/or guardian regarding: the pm1 

      historical points, exam findings, and any diagnostic results supporting the                 

      discharge/admit diagnosis, lab results, radiology results, the need for further work-up     

      and treatment in the hospital.                                                              

14:30 Physician consultation: Naina Brand MD was called at 14:30, was contacted at 14:30,   pm1 

      regarding admission, patient's condition, and will see patient.                             

                                                                                                  

                                                                                             

12:34 Order name: Urine Microscopic Only                                                      pm1 

                                                                                             

12:56 Order name: Basic Metabolic Panel; Complete Time: 13:26                                 EDMS

                                                                                             

12:56 Order name: Liver (Hepatic) Function; Complete Time: 13:26                              EDMS

                                                                                             

12:56 Order name: Creatine Phosphokinase; Complete Time: 13:26                                EDMS

                                                                                             

12:56 Order name: CKMB Creatine Kinase MB; Complete Time: 13:26                               EDMS

                                                                                             

12:56 Order name: Troponin I; Complete Time: 13:26                                            EDMS

                                                                                             

12:56 Order name: Amylase; Complete Time: 13:26                                               EDMS

                                                                                             

12:56 Order name: Lipase; Complete Time: 13:26                                                EDMS

                                                                                             

12:56 Order name: Lactate; Complete Time: 13:26                                               EDMS

                                                                                             

12:56 Order name: Procalcitonin; Complete Time: 13:27                                         EDMS

                                                                                             

12:56 Order name: CBC with Automated Diff; Complete Time: 14:07                               EDMS

                                                                                             

12:56 Order name: Protime (+INR); Complete Time: 13:26                                        EDMS

                                                                                             

12:56 Order name: PTT, Activated Partial Thromb; Complete Time: 13:26                         EDMS

                                                                                             

12:57 Order name: Blood Culture                                                               [facility]

                                                                                             

12:34 Order name: Accucheck; Complete Time: 12:48                                             pm1 

                                                                                             

12:34 Order name: Cardiac monitoring; Complete Time: 12:47                                    pm1 

                                                                                             

12:56 Order name: Chest Single View; Complete Time: 13:51                                     EDMS

                                                                                             

13:31 Order name: Blood Culture                                                               [facility]

                                                                                             

13:49 Order name: Influenza Screen (A ; Complete Time: 15:54                                  EDMS

                                                                                             

13:49 Order name: Group A Streptococcus Rapid Sc; Complete Time: 14:28                        EDMS

                                                                                             

14:05 Order name: CBC Smear Scan                                                              EDMS

                                                                                             

14:14 Order name: Throat Culture                                                              EDMS

                                                                                             

14:58 Order name: SARS-COV-2 RT PCR; Complete Time: 15:00                                     EDMS

                                                                                             

16:50 Order name: Basic Metabolic Panel                                                       EDMS

                                                                                             

16:50 Order name: Basic Metabolic Panel                                                       EDMS

                                                                                             

16:50 Order name: Lipid Profile                                                               EDMS

                                                                                             

16:50 Order name: Lipid Profile                                                               EDMS

                                                                                             

16:50 Order name: CONS Physician Consult                                                      EDMS

                                                                                             

16:50 Order name: Heart Healthy                                                               EDMS

                                                                                             

16:50 Order name: CBC with Automated Diff                                                     EDMS

                                                                                             

16:50 Order name: CBC with Automated Diff                                                     EDMS

                                                                                             

16:50 Order name: Magnesium                                                                   EDMS

                                                                                             

16:50 Order name: Magnesium                                                                   EDMS

                                                                                             

16:50 Order name: Phosphorus                                                                  EDMS

                                                                                             

16:50 Order name: Phosphorus                                                                  EDMS

                                                                                             

16:50 Order name: Procalcitonin                                                               EDMS

                                                                                             

16:50 Order name: Procalcitonin; Complete Time: 18:03                                         EDMS

                                                                                             

12:34 Order name: EKG - Nurse/Tech; Complete Time: 12:43                                      pm1 

                                                                                             

12:34 Order name: IV Saline Lock - Large Bore; Complete Time: 12:47                           pm1 

                                                                                             

12:34 Order name: Labs collected and sent; Complete Time: 12:47                               pm1 

                                                                                             

12:34 Order name: O2 Per Protocol; Complete Time: 12:43                                       pm1 

                                                                                             

12:34 Order name: O2 Sat Monitoring; Complete Time: 12:43                                     pm1 

                                                                                                  

Administered Medications:                                                                         

13:00 Drug: Acetaminophen 1000 mg Route: PO;                                                  bp  

17:14 Follow up: Response: Temperature is decreased                                           bp  

13:20 Drug: NS 0.9% (30 ml/kg) 30 ml/kg Route: IV; Rate: bolus; Site: left antecubital;       bp  

17:15 Follow up: IV Status: Completed infusion; IV Intake: 2000ml                             bp  

13:20 Drug: Rocephin (cefTRIAXone) 50 mg/kg Route: IM; Site: affected area;                   bp  

17:14 Follow up: Response: No adverse reaction                                                bp  

13:20 Drug: Albuterol - atroVENT (ipratropium) (3:1) (2.5 mg - 0.5 mg) 3 ml Route: Nebulizer; bp  

17:14 Follow up: Response: Marked relief of symptoms                                          bp  

13:20 Drug: SOLU-Medrol (methylPrednisoLONE) 125 mg Route: IVP; Site: left forearm;           bp  

17:13 Follow up: Response: No adverse reaction                                                bp  

13:20 Drug: Zofran (Ondansetron) 4 mg Route: IVP; Site: left forearm;                         bp  

17:13 Follow up: Response: No adverse reaction                                                bp  

13:20 Drug: morphine 2 mg Route: IVP; Site: left forearm;                                     bp  

17:13 Follow up: Response: Pain is decreased                                                  bp  

14:30 Drug: AZITHromycin 500 mg Route: IVPB; Infused Over: 1 hrs; Site: left forearm;         bp  

17:13 Follow up: IV Status: Completed infusion; IV Intake: 250ml                              bp  

                                                                                                  

                                                                                                  

Disposition:                                                                                      

21 14:07 Hospitalization ordered by Naina Brand for Inpatient Admission. Preliminary     

  diagnosis is Pneumonia, unspecified organism.                                                   

- Bed requested for Telemetry/MedSurg (Inpatient).                                                

- Status is Inpatient Admission.                                                              bp  

- Condition is Stable.                                                                            

- Problem is new.                                                                                 

- Symptoms have improved.                                                                         

                                                                                                  

                                                                                                  

                                                                                                  

Addendum:                                                                                         

2021                                                                                        

     07:48 Co-signature as Attending Physician, Renzo Ferreira MD I agree with the assessment and  c
ha

           plan of care.                                                                          

                                                                                                  

Signatures:                                                                                       

Dispatcher [employer]                           [facility]                                                 

Renzo Ferreira MD MD cha Calderon, Audri, RN                     RN   aa5                                                  

Alvarez Lennon, ELLEN                    NP   pm1                                                  

Shayne Christopher, RN                      RN   bp                                                   

Jeane Ellis                           eb                                                   

                                                                                                  

Corrections: (The following items were deleted from the chart)                                    

                                                                                             

15:00 14:50 Chest Single View+RAD.RAD.BRZ ordered. [facility]

17:03 14:07 Hospitalization Ordered by Naina Brand MD for Inpatient Admission. Preliminary  eb  

      diagnosis is Pneumonia, unspecified organism. Bed requested for Telemetry/MedSurg           

      (Inpatient). Status is Inpatient Admission. Condition is Stable. Problem is new.            

      Symptoms have improved. pm1                                                                 

17:05 14:50 CBC+H.LAB.BRZ ordered. EDMS                                                       EDMS

17:05 14:50 PCT+C.LAB.BRZ ordered. EDMS                                                       EDMS

17:05 14:50 PROTIME (+INR)+COAG.LAB.BRZ ordered. EDMS                                         EDMS

17:05 14:50 PTT, ACTIVATED+COAG.LAB.BRZ ordered. EDMS                                         EDMS

17:05 16:50 Procalcitonin ordered. EDMS                                                       EDMS

17:06 14:50 AMYLASE, SERUM+C.LAB.BRZ ordered. EDMS                                            EDMS

17:06 14:50 BASIC METABOLIC PANEL+C.LAB.BRZ ordered. EDMS                                     EDMS

17:06 14:50 CKMB+C.LAB.BRZ ordered. EDMS                                                      EDMS

17:06 14:50 CREATINE PHOSPHOKINASE+C.LAB.BRZ ordered. EDMS                                    EDMS

17:06 14:50 LACTATE+C.LAB.BRZ ordered. EDMS                                                   EDMS

17:06 14:50 HEPATIC FUNCTION+C.LAB.BRZ ordered. EDMS                                          EDMS

17:06 14:50 LIPASE+C.LAB.BRZ ordered. EDMS                                                    EDMS

17:06 14:50 TROPONIN (EMERG DEPT USE ONLY)+C.LAB.BRZ ordered. EDMS                            EDMS

17:06 14:51 CORONAVIRUS+MR.LAB.BRZ ordered. EDMS                                              EDMS

17:07 14:50 BLOOD CULTURE*+BA.LAB.BRZ ordered. EDMS                                           EDMS

17:07 14:51 Influenza Screen (A \T\ B)+BA.LAB.BRZ ordered. EDMS                                 EDMS

17:07 14:51 Group A Streptococcus Rapid Sc+[id number] ordered. EDMS                           EDMS

18:14 17:03 2021 14:07 Hospitalization Ordered by Naina Brand MD for Inpatient        bp  

      Admission. Preliminary diagnosis is Pneumonia, unspecified organism. Bed requested for      

      Telemetry/MedSurg (Inpatient). Status is Inpatient Admission. Condition is Stable.          

      Problem is new. Symptoms have improved. eb                                                  

                                                                                                  

**************************************************************************************************

## 2021-06-06 NOTE — XMS REPORT
Continuity of Care Document

                             Created on:2021



Patient:FLASH AGUILERA

Sex:Male

:1960

External Reference #:[id number]





Demographics







                          Address                   [address] [place] [address]

 

                          Home Phone                [phone]

 

                          Mobile Phone              [phone]

 

                          Email Address             [email or web address]

[email or web address]

 

                          Preferred Language        English

 

                          Marital Status            Unknown

 

                          Mandaen Affiliation     Unknown

 

                          Race                      Unknown

 

                          Additional Race(s)        Unavailable



                                                    White

 

                          Ethnic Group              Unknown









Author







                          Organization              [facility]

t

 

                          Address                   [address] [place] [address] [address]



                                                    [place] [address]

 

                          Phone                     [phone]









Support







                Name            Relationship    Address         Phone

 

                Contact         Other           Unavailable     [phone]









Care Team Providers







                    Name                Role                Phone

 

                    Ac CALDWELL,  T       Attending Clinician Unavailable

 

                    Lab,  Fam Pob I     Attending Clinician Unavailable









Problems

This patient has no known problems.



Allergies, Adverse Reactions, Alerts

This patient has no known allergies or adverse reactions.



Medications

This patient has no known medications.



Procedures

This patient has no known procedures.



Encounters







        Start   End     Encounter Admission Attending Care    Care    Encounter 

Source



        Date/Time Date/Time Type    Type    Clinicians Facility Department ID   

   

 

        2020 Letter          DEB Shen    [id number]

52 



        00:00:00 00:00:00 (Out)           Madina DIXON   [id number]         



                                                [facility] [id number]         



                                                        [phone]         



                                                        019             

 

        2020 Laboratory         Lab, [facility]    [id number]

[id number] 



        13:13:24 13:34:21 Only            Fam Pob I [facility]  [id number]         



                                                [place] [id number]         



                                                [facility] [phone]         



                                                nal     044             



                                                Office                  



                                                Building                 



                                                One                     







Results

This patient has no known results.

## 2021-06-07 VITALS — OXYGEN SATURATION: 92 %

## 2021-06-07 VITALS — DIASTOLIC BLOOD PRESSURE: 55 MMHG | TEMPERATURE: 97.2 F | SYSTOLIC BLOOD PRESSURE: 92 MMHG

## 2021-06-07 LAB
BUN BLD-MCNC: 9 MG/DL (ref 7–18)
GLUCOSE SERPLBLD-MCNC: 132 MG/DL (ref 74–106)
HCT VFR BLD CALC: 36.8 % (ref 39.6–49)
HDLC SERPL-MCNC: 45 MG/DL (ref 40–60)
LDLC SERPL CALC-MCNC: 54 MG/DL (ref ?–130)
LYMPHOCYTES # SPEC AUTO: 0.5 K/UL (ref 0.7–4.9)
MAGNESIUM SERPL-MCNC: 2.1 MG/DL (ref 1.8–2.4)
PMV BLD: 9.1 FL (ref 7.6–11.3)
POTASSIUM SERPL-SCNC: 4.6 MMOL/L (ref 3.5–5.1)
RBC # BLD: 4.15 M/UL (ref 4.33–5.43)

## 2021-06-07 RX ADMIN — IPRATROPIUM BROMIDE SCH MG: 0.5 SOLUTION RESPIRATORY (INHALATION) at 01:20

## 2021-06-07 RX ADMIN — METHYLPREDNISOLONE SODIUM SUCCINATE SCH MG: 125 INJECTION, POWDER, FOR SOLUTION INTRAMUSCULAR; INTRAVENOUS at 05:44

## 2021-06-07 RX ADMIN — ENOXAPARIN SODIUM SCH MG: 40 INJECTION SUBCUTANEOUS at 09:00

## 2021-06-07 RX ADMIN — POTASSIUM & SODIUM PHOSPHATES POWDER PACK 280-160-250 MG SCH PKT: 280-160-250 PACK at 09:15

## 2021-06-07 RX ADMIN — ALBUTEROL SULFATE SCH MG: 2.5 SOLUTION RESPIRATORY (INHALATION) at 01:20

## 2021-06-07 RX ADMIN — METHYLPREDNISOLONE SODIUM SUCCINATE SCH MG: 125 INJECTION, POWDER, FOR SOLUTION INTRAMUSCULAR; INTRAVENOUS at 01:17

## 2021-06-07 RX ADMIN — Medication SCH ML: at 09:15

## 2021-06-07 RX ADMIN — POTASSIUM & SODIUM PHOSPHATES POWDER PACK 280-160-250 MG SCH: 280-160-250 PACK at 10:37

## 2021-06-07 RX ADMIN — ENOXAPARIN SODIUM SCH: 40 INJECTION SUBCUTANEOUS at 09:00

## 2021-06-07 NOTE — RAD REPORT
EXAM DESCRIPTION:  CT - Chest Angio - 6/7/2021 8:56 am

 

CLINICAL HISTORY:   Chest pain

 

COMPARISON:  2018

 

TECHNIQUE:  Dynamically enhanced axial 3 mm thick images of the chest were obtained during administra
tion of <100> mL Isovue 370 IV contrast. Coronal and oblique reconstruction images were generated and
 reviewed. Exam utilizes a protocol for optimal evaluation of pulmonary arterial tree.

 

Maximum intensity projections 3D imaging was utilized

 

All CT scans are performed using dose optimization technique as appropriate and may include automated
 exposure control or mA/KV adjustment according to patient size.

 

FINDINGS:  A pulmonary embolus is not seen.

 

A thoracic aortic aneurysm is not noted.

 

Minimal right pleural effusion. A pericardial effusion is not seen.

 

Bullous and paraseptal emphysema. Mild patchy lateral right middle lobe opacities. Mild bilateral shirley
e-in-bud opacities bilaterally.

 

IMPRESSION:  Negative for a pulmonary embolism.

 

COPD

 

Mild bilateral tree-in-bud opacities probably an atypical pneumonia.

 

Mild right middle lobe opacity likely additional pneumonia

## 2021-06-07 NOTE — P.CNS
Date of Consult: 06/07/21


Reason for Consult: COPD exacerbation


Chief Complaint:  COPD exacerbation


History of Present Illness: 





 patient is 61 years of age well known to me history of severe COPD recurrent 

exacerbation maximum bronchodilator therapy came in started complaining of yobany

re onset of right-sided chest discomfort worsening dyspnea is not back at his 

baseline doing much better pain has resolved is no evidence of pulmonary 

embolism minimal inflammatory changes on the CT scan denies any fever chills or 

coughing up any productive phlegm


Allergies





No Known Allergies Allergy (Verified 10/02/20 01:41)


   





Home Medications: 








predniSONE [Deltasone*] 1 tab PO DAILY 10/02/20 


Fluticasone/Umeclidin/Vilanter [Trelegy Ellipta 100-62.5-25] 1 each IH DAILY 

01/04/21 


Theophylline [Hitesh-Dur*] 300 mg PO DAILY 06/06/21 








- Past Medical/Surgical History


Diabetic: No


-: COPD - emphysema on home oxygen and chronic steroids


-: History of tobacco abuse


-: esophageal stricture/dysphagia


-: mastoidectomy


-: appendectomy


Psychosocial/ Personal History: Patient lives at home with his wife and 2 

daughters and is currently working at [employer].





- Family History


  ** Mother


Medical History: Heart disease, Hypertension





  ** Father


Medical History: Heart disease, Hypertension, Stroke





- Social History


Smoking Status: Current every day smoker


Alcohol use: No


CD- Drugs: No


Caffeine use: Yes


Place of Residence: Home





Review of Systems


General: Weakness


Respiratory: Cough, Shortness of Breath





Physical Examination














Temp Pulse Resp BP Pulse Ox


 


 98.1 F   83   20   95/56 L  95 


 


 06/07/21 08:00  06/07/21 08:00  06/07/21 08:00  06/07/21 08:00  06/07/21 08:00








General: Alert, Oriented x3


Respiratory: Clear to auscultation bilaterally, Expiratory wheezes


Cardiovascular: No edema, Regular rate/rhythm


Laboratory Data (last 24 hrs)





06/06/21 12:43: PT 15.0 H, INR 1.30, APTT 27.9


06/06/21 12:43: WBC 19.50 H, Hgb 14.5, Hct 45.2, Plt Count 312


06/06/21 12:43: Sodium 135 L, Potassium 3.9, BUN 10, Creatinine 0.90, Glucose 

192 H, Total Bilirubin 1.6 H, AST 13 L, ALT 18, Alkaline Phosphatase 57, 

Troponin I < 0.02, Amylase 27, Lipase 17 L


06/06/21 12:34: PT Cancelled, INR Cancelled, APTT Cancelled


06/06/21 12:34: WBC Cancelled, Hgb Cancelled, Hct Cancelled, Plt Count Cancelled


06/06/21 12:34: Sodium Cancelled, Potassium Cancelled, BUN Cancelled, Creatinine

Cancelled, Glucose Cancelled, Total Bilirubin Cancelled, AST Cancelled, ALT 

Cancelled, Alkaline Phosphatase Cancelled, Amylase Cancelled, Lipase Cancelled








- Problems


(1) COPD with acute exacerbation


Current Visit: Yes   Status: Acute   


Plan: 


 patient is 61 years of age has a history of severe COPD recurrent exacerbations

compliant with this therapy there is no evidence of thromboembolism minimal and 

inflammatory changes on a CT scan plan to discharge patient has a Pseudomonas 

lung infection discharge on prednisone 10 b.i.d. can use a levofloxacin if 

needed labs reviewed white count is mildly elevated vital signs all reviewed 

plan to discharge

## 2021-06-07 NOTE — P.HP
Certification for Inpatient


Patient admitted to: Inpatient


With expected LOS: >2 Midnights


Patient will require the following post-hospital care: None


Practitioner: I am a practitioner with admitting privileges, knowledge of 

patient current condition, hospital course, and medical plan of care.


Services: Services provided to patient in accordance with Admission requirements

found in Title 42 Section 412.3 of the Code of Federal Regulations





Patient History


Date of Service: 06/06/21


Reason for admission: RUL pneumonia


History of Present Illness: 





Patient is a 61-year-old gentleman who came to the hospital with difficulty 

breathing.  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 

work.  While patient was at the home working he became short of breath.  He 

normally has significant COPD and he has had chronic Pseudomonas infection 

according to the patient.  He takes nebulizer treatments every 8 hr and is on 3-

4 L of oxygen.  In the emergency room he was on 8-9 L of oxygen.  Decision was 

made to admit the patient to the hospital for further evaluation.  Will go ahead

and start patient on antibiotic therapy as well as treatment for COPD 

exacerbation.


Allergies





No Known Allergies Allergy (Verified 10/02/20 01:41)


   





Home Medications: 








predniSONE [Deltasone*] 1 tab PO DAILY 10/02/20 


Fluticasone/Umeclidin/Vilanter [Trelegy Ellipta 100-62.5-25] 1 each IH DAILY 

01/04/21 


Theophylline [Hitesh-Dur*] 300 mg PO DAILY 06/06/21 








- Past Medical/Surgical History


Has patient received pneumonia vaccine in the past: No


Diabetic: No


-: COPD - emphysema on home oxygen and chronic steroids


-: History of tobacco abuse


-: esophageal stricture/dysphagia


-: mastoidectomy


-: appendectomy


Psychosocial/ Personal History: Patient lives at home with his wife and 2 

daughters and is currently working at [employer].





- Family History


  ** Mother


Medical History: Heart disease, Hypertension





  ** Father


Medical History: Heart disease, Hypertension, Stroke





- Social History


Smoking Status: Former smoker


Alcohol use: No


CD- Drugs: No


Caffeine use: Yes


Place of Residence: Home





Review of Systems


10-point ROS is otherwise unremarkable





Physical Examination





- Vital Signs


Temperature: 97 F


Blood Pressure: 130/52


Pulse: 88


Respirations: 19


Pulse Ox (%): 92





- Physical Exam


General: Alert, In no apparent distress, Oriented x3


HEENT: Atraumatic, PERRLA, Mucous membr. moist/pink, EOMI, Sclerae nonicteric


Neck: Supple, 2+ carotid pulse no bruit, No LAD, Without JVD or thyroid 

abnormality


Respiratory: Diminished, Expiratory wheezes, Rhonchi/gurgles


Cardiovascular: Regular rate/rhythm, Normal S1 S2


Gastrointestinal: Normal bowel sounds, Soft and benign, Non-distended, No 

tenderness


Musculoskeletal: No clubbing, No swelling, No tenderness


Integumentary: No rashes


Neurological: Normal gait, Normal speech, Normal strength at 5/5 x4 extr, Normal

tone, Sensation intact, Cranial nerves 3-12 intact, Normal affect


Lymphatics: No axilla or inguinal lymphadenopathy





- Studies


Laboratory Data (last 24 hrs)





06/06/21 12:43: PT 15.0 H, INR 1.30, APTT 27.9


06/06/21 12:43: WBC 19.50 H, Hgb 14.5, Hct 45.2, Plt Count 312


06/06/21 12:43: Sodium 135 L, Potassium 3.9, BUN 10, Creatinine 0.90, Glucose 

192 H, Total Bilirubin 1.6 H, AST 13 L, ALT 18, Alkaline Phosphatase 57, 

Troponin I < 0.02, Amylase 27, Lipase 17 L


06/06/21 12:34: PT Cancelled, INR Cancelled, APTT Cancelled


06/06/21 12:34: WBC Cancelled, Hgb Cancelled, Hct Cancelled, Plt Count Cancelled


06/06/21 12:34: Sodium Cancelled, Potassium Cancelled, BUN Cancelled, Creatinine

Cancelled, Glucose Cancelled, Total Bilirubin Cancelled, AST Cancelled, ALT 

Cancelled, Alkaline Phosphatase Cancelled, Amylase Cancelled, Lipase Cancelled





Microbiology Data (last 24 hrs): 








06/06/21 13:20   Nasopharnyx   Influenza Type A Antigen Screen - Final


06/06/21 13:20   Nasopharnyx   Influenza Type B Antigen Screen - Final


06/06/21 13:20   Throat   Group A Streptococcus Rapid Screen - Final








Assessment & Plan





- Problems (Diagnosis)


(1) Right upper lobe pneumonia


Current Visit: Yes   Status: Acute   





(2) COPD with acute exacerbation


Current Visit: Yes   Status: Acute   





(3) Fever


Current Visit: Yes   Status: Acute   





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


Current Visit: Yes   Status: Acute   





- Plan





1. Continue with IV antibiotics


2. Awaiting sputum and blood culture


3. Repeat chest x-ray


4. Will proceed with CT scan of the chest if pneumonia is not improved to 

evaluate for postobstructive pneumonia


5. Pulmonary consultation


6. Continue with nebs Q 6 hr and continue with IV steroids


7. O2 per protocol


8. Continue with gentle hydration


9. Repeat labs including CBC and renal function in a.m.; continue with proc

alcitonin level


10. GI and DVT prophylaxis


Discharge Plan: Home


Plan to discharge in: Greater than 2 days





- Advance Directives


Does patient have a Living Will: No


Does patient have a Durable POA for Healthcare: No





- Code Status/Comfort Care


Code Status Assessed: Yes


Code Status: Full Code


Critical Care: No


Time Spent Managing PTS Care (In Minutes): 45

## 2021-06-07 NOTE — P.DS
Admission Date: 06/06/21


Discharge Date: 06/07/21


Primary Care Provider: Dr. Santos; Pulmonary-Dr. Ha


Disposition: ROUTINE DISCHARGE


Discharge Condition: GOOD


Reason for Admission: RUL pneumonia


Consultations: 





Pulmonary-Dr. Ha





Procedures: 





COVID: 


Negative





CT scan: 





Medical Problem List: 


Dyspnea secondary to COPD exacerbation with bronchiectasis on chronic steroids 

and oxygen


Brief History of Present Illness: 





61-year-old  male with history of COPD on chronic steroids and oxygen. 

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  While 

patient was at the home working he became short of breath.  He normally has 

significant COPD and he has had chronic Pseudomonas infection according to the 

patient.  He takes nebulizer treatments every 8 hr and is on 3-4 L of oxygen.  

In the emergency room he was on 8-9 L of oxygen.  Patient was admitted for 

further evaluation and treatment.


Hospital Course: 





Patient presented with shortness of breath secondary to COPD exacerbation with 

bronchiectasis.  Patient with history of Pseudomonas in the sputum.  Patient on 

chronic oxygen and steroids.  Patient recently helped a friend with 

construction.  This likely exacerbated his COPD.  No evidence of pulmonary 

embolism on CT scan.  Patient improved with IV steroids and treatment.  Patient 

seen and evaluated by pulmonology.  At discharge patient will continue with home

oxygen to maintain sats above 93%.  Patient currently on 3 L per nasal cannula. 

Patient will continue with prednisone 10 mg 1 pill twice daily for 7 days then 

continue with his regular regimen of 10 mg daily.  Patient may continue with 

Levaquin 750 mg once daily for 7 days.  Patient will continue with his other 

COPD medication including Trelegy 1 puff daily and Theodur 300 mg daily.  

Pulmonology reports patient also takes tobramycin nebs.  Patient may continue 

with this at home.  Recommend follow up with pulmonology in 1 week to follow up 

this hospitalization.  Sputum culture obtained.  This can be followed up as an 

outpatient.


Vital Signs/Physical Exam: 














Temp Pulse Resp BP Pulse Ox


 


 98.1 F   83   20   95/56 L  95 


 


 06/07/21 08:00  06/07/21 08:00  06/07/21 08:00  06/07/21 08:00  06/07/21 08:00








General: Alert, In no apparent distress, Oriented x3, Cooperative


HEENT: Atraumatic


Neck: Supple


Respiratory: Clear to auscultation bilaterally, Normal air movement


Cardiovascular: Normal pulses, Regular rate/rhythm


Gastrointestinal: Normal bowel sounds, No masses, No rebound, No guarding


Musculoskeletal: No tenderness


Integumentary: No tenderness/swelling


Neurological: Normal speech, Normal strength at 5/5 x4 extr, Normal tone, Normal

affect


Laboratory Data at Discharge: 














WBC  16.10 K/uL (4.3-10.9)  H D 06/07/21  03:22    


 


Hgb  12.2 g/dL (13.6-17.9)  L  06/07/21  03:22    


 


Hct  36.8 % (39.6-49.0)  L D 06/07/21  03:22    


 


Plt Count  251 K/uL (152-406)   06/07/21  03:22    


 


PT  15.0 SECONDS (9.5-12.5)  H  06/06/21  12:43    


 


INR  1.30   06/06/21  12:43    


 


APTT  27.9 SECONDS (24.3-36.9)   06/06/21  12:43    


 


Sodium  140 mmol/L (136-145)   06/07/21  03:22    


 


Potassium  4.6 mmol/L (3.5-5.1)   06/07/21  03:22    


 


BUN  9 mg/dL (7-18)   06/07/21  03:22    


 


Creatinine  0.53 mg/dL (0.55-1.3)  L  06/07/21  03:22    


 


Glucose  132 mg/dL ()  H  06/07/21  03:22    


 


Phosphorus  2.1 mg/dL (2.5-4.9)  L  06/07/21  03:22    


 


Magnesium  2.1 mg/dL (1.8-2.4)   06/07/21  03:22    


 


Total Bilirubin  1.6 mg/dL (0.2-1.0)  H  06/06/21  12:43    


 


AST  13 U/L (15-37)  L  06/06/21  12:43    


 


ALT  18 U/L (12-78)   06/06/21  12:43    


 


Alkaline Phosphatase  57 U/L ()   06/06/21  12:43    


 


Troponin I  < 0.02 ng/mL (0.0-0.045)   06/06/21  12:43    


 


Triglycerides  40 mg/dL (<150)   06/07/21  03:22    


 


Cholesterol  107 mg/dL (<200)   06/07/21  03:22    


 


HDL Cholesterol  45 mg/dL (40-60)   06/07/21  03:22    


 


Cholesterol/HDL Ratio  2.38   06/07/21  03:22    


 


Amylase  27 U/L ()   06/06/21  12:43    


 


Lipase  17 U/L ()  L  06/06/21  12:43    








Home Medications: 








Fluticasone/Umeclidin/Vilanter [Trelegy Ellipta 100-62.5-25] 1 each IH DAILY 

01/04/21 


Theophylline [Hitesh-Dur*] 300 mg PO DAILY 06/06/21 


levoFLOXacin [Levaquin*] 750 mg PO DAILY #7 tab 06/07/21 


predniSONE [Deltasone*] 1 tab PO SEECOM #21 tab 06/07/21 





New Medications: 


predniSONE [Deltasone*] 1 tab PO SEECOM #21 tab


levoFLOXacin [Levaquin*] 750 mg PO DAILY #7 tab


Physician Discharge Instructions: 


Patient presented with shortness of breath secondary to COPD exacerbation with 

bronchiectasis.  Patient with history of Pseudomonas in the sputum.  Patient on 

chronic oxygen and steroids.  Patient recently helped a friend with 

construction.  This likely exacerbated his COPD.  No evidence of pulmonary 

embolism on CT scan.  Patient improved with IV steroids and treatment.  Patient 

seen and evaluated by pulmonology.  At discharge patient will continue with home

 oxygen to maintain sats above 93%.  Patient currently on 3 L per nasal cannula.

  Patient will continue with prednisone 10 mg 1 pill twice daily for 7 days then

 continue with his regular regimen of 10 mg daily.  Patient may continue with 

Levaquin 750 mg once daily for 7 days.  Patient will continue with his other 

COPD medication including Trelegy 1 puff daily and Theodur 300 mg daily.  Pul

monology reports patient also takes tobramycin nebs.  Patient may continue with 

this at home.  Recommend follow up with pulmonology in 1 week to follow up this 

hospitalization.  Sputum culture obtained.  This can be followed up as an 

outpatient.


Diet: Regular


Activity: Ad radha


Followup: 


Jackie Santos MD [Primary Care Provider] - 


Time spent managing pt's care (in minutes): 55

## 2021-06-20 ENCOUNTER — HOSPITAL ENCOUNTER (EMERGENCY)
Dept: HOSPITAL 97 - ER | Age: 61
Discharge: HOME | End: 2021-06-20
Payer: COMMERCIAL

## 2021-06-20 VITALS — OXYGEN SATURATION: 96 % | DIASTOLIC BLOOD PRESSURE: 70 MMHG | SYSTOLIC BLOOD PRESSURE: 122 MMHG

## 2021-06-20 VITALS — TEMPERATURE: 99 F

## 2021-06-20 DIAGNOSIS — M54.5: Primary | ICD-10-CM

## 2021-06-20 PROCEDURE — 99283 EMERGENCY DEPT VISIT LOW MDM: CPT

## 2021-06-20 PROCEDURE — 96372 THER/PROPH/DIAG INJ SC/IM: CPT

## 2021-06-20 PROCEDURE — 72100 X-RAY EXAM L-S SPINE 2/3 VWS: CPT

## 2021-06-20 NOTE — XMS REPORT
Continuity of Care Document

                            Created on:2021



Patient:FLASH AGUILERA

Sex:Male

:1960

External Reference #:[id number]





Demographics







                          Address                   [address] [place] [address]

 

                          Home Phone                [phone]

 

                          Mobile Phone              [phone]

 

                          Email Address             [email or web address]

[email or web address]

 

                          Preferred Language        English

 

                          Marital Status            Unknown

 

                          Pentecostal Affiliation     Unknown

 

                          Race                      Unknown

 

                          Additional Race(s)        Unavailable



                                                    White

 

                          Ethnic Group              Unknown









Author







                          Organization              [facility]

t

 

                          Address                   [address] [place] [address] [address]



                                                    [place] [address]

 

                          Phone                     [phone]









Support







                Name            Relationship    Address         Phone

 

                Contact         Other           Unavailable     [phone]









Care Team Providers







                    Name                Role                Phone

 

                    Ac CALDWELL,  T       Attending Clinician Unavailable

 

                    Lab,  Fam Pob I     Attending Clinician Unavailable









Problems

This patient has no known problems.



Allergies, Adverse Reactions, Alerts

This patient has no known allergies or adverse reactions.



Medications

This patient has no known medications.



Procedures

This patient has no known procedures.



Encounters







        Start   End     Encounter Admission Attending Care    Care    Encounter 

Source



        Date/Time Date/Time Type    Type    Clinicians Facility Department ID   

   

 

        2020 Letter          DEB Shen    [id number]

52 



        00:00:00 00:00:00 (Out)           Madina DIXON   [id number]         



                                                [facility] [id number]         



                                                        [phone]         



                                                        019             

 

        2020 Laboratory         Lab, [facility]    [id number]

[id number] 



        13:13:24 13:34:21 Only            Fam Pob I [facility]  [id number]         



                                                [place] [id number]         



                                                [facility] [phone]         



                                                nal     [id number]             



                                                Office                  



                                                Building                 



                                                One                     







Results

This patient has no known results.

## 2021-06-20 NOTE — ER
Nurse's Notes                                                                                     

 [facility]                                                                 

Name: Jaxon Taylor                                                                             

Age: 61 yrs                                                                                       

Sex: Male                                                                                         

: 1960                                                                                   

MRN: [id number]                                                                                   

Arrival Date: 2021                                                                          

Time: 12:30                                                                                       

Account#: [id number]                                                                            

Bed 16                                                                                            

Private MD:                                                                                       

Diagnosis: Low back pain                                                                          

                                                                                                  

Presentation:                                                                                     

                                                                                             

12:39 Chief complaint: Patient states: "I am having really bad back pain. I was seen at my    jd3 

      doctor, but they didn't really help. It is spasms pretty bad and I have to move very        

      slow to prevent it from spasms.". Coronavirus screen: At this time, the client does not     

      indicate any symptoms associated with coronavirus-19. Ebola Screen: Patient negative        

      for fever greater than or equal to 101.5 degrees Fahrenheit, and additional compatible      

      Ebola Virus Disease symptoms. Initial Sepsis Screen: Does the patient meet any 2            

      criteria? No. Patient's initial sepsis screen is negative. Does the patient have a          

      suspected source of infection? No. Patient's initial sepsis screen is negative. Risk        

      Assessment: Do you want to hurt yourself or someone else? Patient reports no desire to      

      harm self or others. Onset of symptoms was 2021.                                   

12:39 Method Of Arrival: Wheelchair                                                           jd3 

12:39 Acuity: FROILAN 3                                                                           jd3 

                                                                                                  

Historical:                                                                                       

- Allergies:                                                                                      

12:42 No Known Allergies;                                                                     jd3 

- PMHx:                                                                                           

12:42 COPD; Emphysema;                                                                        jd3 

- PSHx:                                                                                           

12:42 Appendectomy; Mastoidectomy;                                                            jd3 

                                                                                                  

- Immunization history:: Adult Immunizations up to date.                                          

- Social history:: Smoking status: Patient denies any tobacco usage or history of.                

- Family history:: not pertinent.                                                                 

                                                                                                  

                                                                                                  

Screenin:47 Abuse screen: Denies threats or abuse. Nutritional screening: No deficits noted.        vg1 

      Tuberculosis screening: No symptoms or risk factors identified. Fall Risk No fall in        

      past 12 months (0 pts). No secondary diagnosis (0 pts). No IV (0 pts). Ambulatory Aid-      

      Crutches/Cane/Walker (15 pts). Gait- Impaired (20 pts.). Mental Status- Oriented to own     

      ability (0 pts). Total Pa Fall Scale indicates Low Risk Score (25-44 pts). Fall          

      prevention measures have been instituted. Side Rails Up X 2 Placed close to Nursing         

      Station.                                                                                    

                                                                                                  

Assessment:                                                                                       

12:44 General: Appears in no apparent distress. uncomfortable, Behavior is calm, cooperative. vg1 

      Pain: Complains of pain in back Pain currently is 10 out of 10 on a pain scale. Quality     

      of pain is described as dull, Noted to be grimacing, moaning. Neuro: Level of               

      Consciousness is awake, alert, obeys commands, Oriented to person, place, time,             

      situation. Cardiovascular: Patient's skin is warm and dry. Respiratory: Airway is           

      patent Respiratory effort is even, unlabored. GI: No signs and/or symptoms were             

      reported involving the gastrointestinal system. : No signs and/or symptoms were           

      reported regarding the genitourinary system. EENT: No signs and/or symptoms were            

      reported regarding the EENT system. Derm: Skin is intact, Skin is pink, warm \T\ dry.       

      Musculoskeletal: Circulation, motion, and sensation intact.                                 

14:06 Reassessment: Patient appears in no apparent distress at this time. No changes from     vg1 

      previously documented assessment. Patient and/or family updated on plan of care and         

      expected duration. Pain level reassessed. Patient is alert, oriented x 3, equal             

      unlabored respirations, skin warm/dry/pink.                                                 

                                                                                                  

Vital Signs:                                                                                      

12:42 Pulse 64; Resp 22 S; Temp 99.0(TE); Weight 86.18 kg (R); Height 5 ft. 11 in. (180.34    jd3 

      cm) (R); Pain 10/10;                                                                        

12:44  / 70; Pulse 99; Resp 26; Pulse Ox 96% on 4 lpm NC;                               vg1 

13:10  / 80; Pulse 92; Resp 22; Pulse Ox 96% on 4 lpm NC;                               vg1 

12:42 Body Mass Index 26.50 (86.18 kg, 180.34 cm)                                             jd3 

                                                                                                  

ED Course:                                                                                        

12:30 Patient arrived in ED.                                                                  mr  

12:37 Jolie eVlarde, JAVI is Primary Nurse.                                                  vg1 

12:40 Naina Olmedo MD is Attending Physician.                                           ma2 

12:41 Triage completed.                                                                       jd3 

12:42 Arm band placed on.                                                                     jd3 

12:48 Patient has correct armband on for positive identification. Bed in low position. Call   vg1 

      light in reach. Side rails up X2.                                                           

14:27 Lumbar Spine (3 Views) XRAY In Process Unspecified.                                     EDMS

14:45 No provider procedures requiring assistance completed. Patient did not have IV access   ss  

      during this emergency room visit.                                                           

                                                                                                  

Administered Medications:                                                                         

13:20 Drug: morphine 4 mg Route: IM; Site: right deltoid;                                     vg1 

14:41 Follow up: Response: No adverse reaction; Pain is unchanged, physician notified         vg1 

13:22 Drug: Valium (diazepam) 5 mg Route: IM; Site: left deltoid;                             vg1 

14:47 Follow up: Response: No adverse reaction                                                vg1 

                                                                                                  

                                                                                                  

Outcome:                                                                                          

14:42 Discharge ordered by MD.                                                                michelle 

14:47 Discharged to home ambulatory.                                                          vg1 

14:47 Condition: stable                                                                           

14:47 Discharge instructions given to patient, Instructed on discharge instructions, follow       

      up and referral plans. medication usage, Demonstrated understanding of instructions,        

      follow-up care, medications, Prescriptions given X 2.                                       

14:48 Patient left the ED.                                                                    vg1 

                                                                                                  

Signatures:                                                                                       

Dispatcher [employer]                           EDMS                                                 

ElvisRita Shelby, RN RN ss Davies, Jonathon, RN RN jd3 Alzahri, Mohammad, MD MD ma2 Garcia, Victoria, RN                    RN   vg1                                                  

                                                                                                  

**************************************************************************************************

## 2021-06-20 NOTE — RAD REPORT
EXAM DESCRIPTION:  RAD - Lumbar Spine 3 Views - 6/20/2021 2:28 pm

 

CLINICAL HISTORY:  PAIN

 

COMPARISON:  No comparisons

 

FINDINGS:  A three-view lumbar spine examination was performed.

 

Lumbar bodies are normal in height and alignment. No fracture or acute bony process seen. L5-S1 disc 
space narrowing noted. Bones appear osteopenic for age. No pars defects identified.

 

Large amount of stool is present filling but not dilating the colon. The colon is not fully evaluated
 on the lumbar examination.

 

IMPRESSION:  L5-S1 disc space narrowing. No compression fracture or acute finding.

 

Concerns for disc herniation, central canal abnormality or occult bone process can be addressed with 
follow-up outpatient MR imaging.

 

Constipation pattern in a partially evaluated colon. Large stool volume fills the visualized portions
 of colon.

## 2021-06-20 NOTE — EDPHYS
Physician Documentation                                                                           

 [facility]                                                                 

Name: Jaxon Taylor                                                                             

Age: 61 yrs                                                                                       

Sex: Male                                                                                         

: 1960                                                                                   

MRN: [id number]                                                                                   

Arrival Date: 2021                                                                          

Time: 12:30                                                                                       

Account#: [id number]                                                                            

Bed 16                                                                                            

Private MD:                                                                                       

ED Physician Naina Olmedo                                                                    

HPI:                                                                                              

                                                                                             

13:10 This 61 yrs old  Male presents to ER via Wheelchair with complaints of Back    ma2 

      Pain.                                                                                       

13:10 The patient presents with pain that is chronic. Onset: The symptoms/episode             ma2 

      began/occurred gradually, 6 day(s) ago. Associated signs and symptoms: Pertinent            

      negatives: chest pain, fever, hematuria, incontinence. Severity of symptoms: At their       

      worst the symptoms were moderate, in the emergency department the symptoms are              

      unchanged. The patient has experienced similar episodes in the past.                        

                                                                                                  

Historical:                                                                                       

- Allergies:                                                                                      

12:42 No Known Allergies;                                                                     jd3 

- PMHx:                                                                                           

12:42 COPD; Emphysema;                                                                        jd3 

- PSHx:                                                                                           

12:42 Appendectomy; Mastoidectomy;                                                            jd3 

                                                                                                  

- Immunization history:: Adult Immunizations up to date.                                          

- Social history:: Smoking status: Patient denies any tobacco usage or history of.                

- Family history:: not pertinent.                                                                 

                                                                                                  

                                                                                                  

ROS:                                                                                              

13:10 Constitutional: Negative for fever, chills, and weight loss.                            ma2 

13:10 All other systems are negative.                                                             

                                                                                                  

Exam:                                                                                             

13:10 Constitutional:  This is a well developed, well nourished patient who is awake, alert,  ma2 

      and in no acute distress. Chest/axilla:  Normal chest wall appearance and motion.           

      Nontender with no deformity.  No lesions are appreciated. Cardiovascular:  Regular rate     

      and rhythm with a normal S1 and S2.  No gallops, murmurs, or rubs.  Normal PMI, no JVD.     

       No pulse deficits. Respiratory:  Lungs have equal breath sounds bilaterally, clear to      

      auscultation and percussion.  No rales, rhonchi or wheezes noted.  No increased work of     

      breathing, no retractions or nasal flaring. Abdomen/GI:  Soft, non-tender, with normal      

      bowel sounds.  No distension or tympany.  No guarding or rebound.  No evidence of           

      tenderness throughout. Back:  No spinal tenderness.  No costovertebral tenderness.          

      Full range of motion. MS/ Extremity:  Pulses equal, no cyanosis.  Neurovascular intact.     

       Full, normal range of motion. Neuro:  Awake and alert, GCS 15, oriented to person,         

      place, time, and situation.  Cranial nerves II-XII grossly intact.  Motor strength 5/5      

      in all extremities.  Sensory grossly intact.  Cerebellar exam normal.  Normal gait.         

                                                                                                  

Vital Signs:                                                                                      

12:42 Pulse 64; Resp 22 S; Temp 99.0(TE); Weight 86.18 kg (R); Height 5 ft. 11 in. (180.34    jd3 

      cm) (R); Pain 10/10;                                                                        

12:44  / 70; Pulse 99; Resp 26; Pulse Ox 96% on 4 lpm NC;                               vg1 

13:10  / 80; Pulse 92; Resp 22; Pulse Ox 96% on 4 lpm NC;                               vg1 

12:42 Body Mass Index 26.50 (86.18 kg, 180.34 cm)                                             jd3 

                                                                                                  

MDM:                                                                                              

12:40 Patient medically screened.                                                             [facility] 

13:10 Differential diagnosis: Joint Injury Osteoarthritis sprain, vertebral fracture.         [facility] 

14:41 Data reviewed: vital signs, nurses notes. Counseling: I had a detailed discussion with  ma2 

      the patient and/or guardian regarding: the historical points, exam findings, and any        

      diagnostic results supporting the discharge/admit diagnosis, the presence of at least       

      one elevated blood pressure reading (>120/80) during this emergency department visit,       

      the need for outpatient follow up. Response to treatment: the patient's symptoms have       

      markedly improved after treatment.                                                          

                                                                                                  

                                                                                             

13:05 Order name: Lumbar Spine (3 Views) XRAY                                                 [facility] 

                                                                                                  

Administered Medications:                                                                         

13:20 Drug: morphine 4 mg Route: IM; Site: right deltoid;                                     vg1 

14:41 Follow up: Response: No adverse reaction; Pain is unchanged, physician notified         vg1 

13:22 Drug: Valium (diazepam) 5 mg Route: IM; Site: left deltoid;                             vg1 

14:47 Follow up: Response: No adverse reaction                                                vg1 

                                                                                                  

                                                                                                  

Disposition:                                                                                      

21 14:42 Discharged to Home. Impression: Low back pain.                                     

- Condition is Stable.                                                                            

- Discharge Instructions: Back Pain, Adult.                                                       

- Prescriptions for Cyclobenzaprine 10 mg Oral Tablet - take 1 tablet by ORAL route               

  every 8 hours As needed; 30 tablet. Diclofenac Sodium 75 mg Oral Tablet Sustained               

  Release - take 1 tablet by ORAL route 2 times per day; 30 tablet.                               

- Medication Reconciliation Form, Thank You Letter, Antibiotic Education, Prescription            

  Opioid Use form.                                                                                

- Follow up: Private Physician; When: Tomorrow; Reason: If symptoms return, Continuance           

  of care.                                                                                        

                                                                                                  

                                                                                                  

                                                                                                  

Signatures:                                                                                       

Dispatcher [employer]                           Vin Lynn RN                    RN   jd3                                                  

Naina Olmedo MD MD   ma2                                                  

Jolie Velarde RN                    RN   vg1                                                  

                                                                                                  

Corrections: (The following items were deleted from the chart)                                    

14:48 14:42 2021 14:42 Discharged to Home. Impression: Low back pain. Condition is      vg1 

      Stable. Prescriptions for Cyclobenzaprine 10 mg Oral Tablet - take 1 tablet by ORAL         

      route every 8 hours As needed; 30 tablet, Diclofenac Sodium 75 mg Oral Tablet Sustained     

      Release - take 1 tablet by ORAL route 2 times per day; 30 tablet. and Forms are             

      Medication Reconciliation Form, Thank You Letter, Antibiotic Education, Prescription        

      Opioid Use. Follow up: Private Physician; When: Tomorrow; Reason: If symptoms return,       

      Continuance of care. ma2                                                                    

                                                                                                  

**************************************************************************************************

## 2022-11-18 ENCOUNTER — HOSPITAL ENCOUNTER (INPATIENT)
Dept: HOSPITAL 97 - ER | Age: 62
LOS: 2 days | Discharge: HOME | DRG: 871 | End: 2022-11-20
Attending: HOSPITALIST | Admitting: HOSPITALIST
Payer: COMMERCIAL

## 2022-11-18 VITALS — BODY MASS INDEX: 22.3 KG/M2

## 2022-11-18 DIAGNOSIS — J18.9: ICD-10-CM

## 2022-11-18 DIAGNOSIS — Z79.51: ICD-10-CM

## 2022-11-18 DIAGNOSIS — Z90.49: ICD-10-CM

## 2022-11-18 DIAGNOSIS — J96.21: ICD-10-CM

## 2022-11-18 DIAGNOSIS — Z99.81: ICD-10-CM

## 2022-11-18 DIAGNOSIS — R65.20: ICD-10-CM

## 2022-11-18 DIAGNOSIS — Z28.310: ICD-10-CM

## 2022-11-18 DIAGNOSIS — A41.9: Primary | ICD-10-CM

## 2022-11-18 DIAGNOSIS — Z79.52: ICD-10-CM

## 2022-11-18 DIAGNOSIS — J43.9: ICD-10-CM

## 2022-11-18 DIAGNOSIS — Z79.899: ICD-10-CM

## 2022-11-18 DIAGNOSIS — F17.210: ICD-10-CM

## 2022-11-18 DIAGNOSIS — Z20.822: ICD-10-CM

## 2022-11-18 LAB
ALBUMIN SERPL BCP-MCNC: 3.5 G/DL (ref 3.4–5)
ALP SERPL-CCNC: 70 U/L (ref 45–117)
ALT SERPL W P-5'-P-CCNC: 21 U/L (ref 12–78)
AST SERPL W P-5'-P-CCNC: 9 U/L (ref 15–37)
BUN BLD-MCNC: 12 MG/DL (ref 7–18)
COHGB MFR BLDA: 0.8 % (ref 0–1.5)
GLUCOSE SERPLBLD-MCNC: 99 MG/DL (ref 74–106)
HCT VFR BLD CALC: 45.3 % (ref 39.6–49)
INR BLD: 0.99
LYMPHOCYTES # SPEC AUTO: 1.8 K/UL (ref 0.7–4.9)
MAGNESIUM SERPL-MCNC: 2.2 MG/DL (ref 1.8–2.4)
MCV RBC: 88.1 FL (ref 80–100)
NT-PROBNP SERPL-MCNC: 61 PG/ML (ref ?–125)
OXYHGB MFR BLDA: 26.6 % (ref 94–97)
PMV BLD: 8.4 FL (ref 7.6–11.3)
POTASSIUM SERPL-SCNC: 3.9 MMOL/L (ref 3.5–5.1)
RBC # BLD: 5.14 M/UL (ref 4.33–5.43)
SAO2 % BLDA: 27.2 % (ref 92–98.5)
SARS-COV-2 RNA RESP QL NAA+PROBE: NEGATIVE
TROPONIN I SERPL HS-MCNC: 6.1 PG/ML (ref ?–58.9)

## 2022-11-18 PROCEDURE — 36415 COLL VENOUS BLD VENIPUNCTURE: CPT

## 2022-11-18 PROCEDURE — 83880 ASSAY OF NATRIURETIC PEPTIDE: CPT

## 2022-11-18 PROCEDURE — 85610 PROTHROMBIN TIME: CPT

## 2022-11-18 PROCEDURE — 80076 HEPATIC FUNCTION PANEL: CPT

## 2022-11-18 PROCEDURE — 94640 AIRWAY INHALATION TREATMENT: CPT

## 2022-11-18 PROCEDURE — 96374 THER/PROPH/DIAG INJ IV PUSH: CPT

## 2022-11-18 PROCEDURE — 99285 EMERGENCY DEPT VISIT HI MDM: CPT

## 2022-11-18 PROCEDURE — 87070 CULTURE OTHR SPECIMN AEROBIC: CPT

## 2022-11-18 PROCEDURE — 80053 COMPREHEN METABOLIC PANEL: CPT

## 2022-11-18 PROCEDURE — 83605 ASSAY OF LACTIC ACID: CPT

## 2022-11-18 PROCEDURE — 0240U: CPT

## 2022-11-18 PROCEDURE — 85025 COMPLETE CBC W/AUTO DIFF WBC: CPT

## 2022-11-18 PROCEDURE — 87040 BLOOD CULTURE FOR BACTERIA: CPT

## 2022-11-18 PROCEDURE — 5A09457 ASSISTANCE WITH RESPIRATORY VENTILATION, 24-96 CONSECUTIVE HOURS, CONTINUOUS POSITIVE AIRWAY PRESSURE: ICD-10-PCS

## 2022-11-18 PROCEDURE — 94760 N-INVAS EAR/PLS OXIMETRY 1: CPT

## 2022-11-18 PROCEDURE — 96375 TX/PRO/DX INJ NEW DRUG ADDON: CPT

## 2022-11-18 PROCEDURE — 83735 ASSAY OF MAGNESIUM: CPT

## 2022-11-18 PROCEDURE — 87205 SMEAR GRAM STAIN: CPT

## 2022-11-18 PROCEDURE — 71045 X-RAY EXAM CHEST 1 VIEW: CPT

## 2022-11-18 PROCEDURE — 82805 BLOOD GASES W/O2 SATURATION: CPT

## 2022-11-18 PROCEDURE — 80048 BASIC METABOLIC PNL TOTAL CA: CPT

## 2022-11-18 PROCEDURE — 93005 ELECTROCARDIOGRAM TRACING: CPT

## 2022-11-18 PROCEDURE — 94660 CPAP INITIATION&MGMT: CPT

## 2022-11-18 PROCEDURE — 84484 ASSAY OF TROPONIN QUANT: CPT

## 2022-11-18 NOTE — XMS REPORT
Continuity of Care Document

                          Created on:2022



Patient:FLASH AGUILERA

Sex:Male

:1960

External Reference #:[id number]





Demographics







                          Address                   [address] [place] [address]

 

                          Home Phone                [phone]

 

                          Work Phone                [phone]

 

                          Mobile Phone              [phone]

 

                          Email Address             [email or web address]

[email or web address]

 

                          Preferred Language        English

 

                          Marital Status            Unknown

 

                          Gnosticism Affiliation     Unknown

 

                          Race                      Unknown

 

                          Additional Race(s)        Unavailable



                                                    White

 

                          Ethnic Group              Unknown









Author







                          Organization              [facility]

t

 

                          Address                   [address]



                                                    [place] [address]

 

                          Phone                     [phone]









Support







                Name            Relationship    Address         Phone

 

                Contact, No     Other           Unavailable     [phone]









Care Team Providers







                    Name                Role                Phone

 

                    Speedy               Attending Clinician Unavailable

 

                    IRENE FLORES      Attending Clinician Unavailable

 

                    MARJ     Attending Clinician Unavailable

 

                    JAKOB EMERSON        Attending Clinician Unavailable

 

                    Madina Shen RN  Attending Clinician Unavailable

 

                    Lab, [facility]  Attending Clinician Unavailable

 

                    Antoinetet Fung  Attending Clinician [phone]

 

                    ANTOINETTE HAYES      Attending Clinician Unavailable

 

                    Speedy               Admitting Clinician Unavailable

 

                    IRENE FLORES      Admitting Clinician Unavailable

 

                    MARJ     Admitting Clinician Unavailable









Payers







           Payer Name Policy Type Policy Number Effective Date Expiration Date S

parish

 

           [place]            [id number] 2022 00:00:00       

     



           [place]                                               







Problems

This patient has no known problems.



Allergies, Adverse Reactions, Alerts







       Allergy Allergy Status Severity Reaction(s) Onset  Inactive Treating Comm

ents 

Source



       Name   Type                        Date   Date   Clinician        

 

       NO KNOWN Drug   Active                                           [facility]



       ALLERGIE Class                                                   ity of



       [facility]







Social History







           Social Habit Start Date Stop Date  Quantity   Comments   Source

 

           Sex Ass[facility] At Birth                                             [facility]

 

           [facility]2                       Yes                   [facility]

[facility]



           (event)                                                [facility]









                Smoking Status  Start Date      Stop Date       Source

 

                Former Smoker                                   [facility]

l Group

 

                Unknown if ever smoked                                 [facility]







Medications







       Ordered Filled Start  Stop   Current Ordering Indication Dosage Frequency

 Signature

                    Comments            Components          Source



     Medication Medication Date Date Medication? Clinician                (SIG) 

          



     Name Name                                                   

 

     acetylcyste acetylcyste           No                       acetylcyst      

     [facility]



     ine 200 ine 200                                    eine 200           da



     mg/mL (20 mg/mL (20                                    mg/mL (20           

Medical



     %) solution %) solution                                    %)             G

roup



     USE 1 VIAL USE 1 VIAL                                    solution          

 



     BY   BY                                      USE 1 VIAL           



     NEBULIZER NEBULIZER                                    BY             



     EVERY 6 EVERY 6                                    NEBULIZER           



     HOURS AS HOURS AS                                    EVERY 6           



     NEEDED FOR NEEDED FOR                                    HOURS AS          

 



     7 DAYS 7 DAYS                                    NEEDED FOR           



                                                  7 DAYS           

 

     acetylcyste acetylcyste           No                       acetylcyst      

     [facility]



     ine powder ine powder                                    eine           da



                                                  powder           Medical



                                                                 Group

 

     albuterol albuterol           No                       albuterol           

[facility]



     sulfate HFA sulfate HFA                                    sulfate         

  da



     90   90                                      HFA 90           Medical



     mcg/actuati mcg/actuati                                    mcg/actuat      

     Group



     on aerosol on aerosol                                    ion            



     inhaler inhaler                                    aerosol           



     INHALE 2 INHALE 2                                    inhaler           



     PUFFS BY PUFFS BY                                    INHALE 2           



     MOUTH 4 MOUTH 4                                    PUFFS BY           



     TIMES DAILY TIMES DAILY                                    MOUTH 4         

  



     AS NEEDED AS NEEDED                                    TIMES           



                                                  DAILY AS           



                                                  NEEDED           

 

     ergocalcife ergocalcife           No                       ergocalcif      

     [facility]



     rol  rol                                     mariah           da



     (vitamin (vitamin                                    (vitamin           Med

ical



     D2) 1,250 D2) 1,250                                    D2) 1,250           

Group



     mcg (50,000 mcg (50,000                                    mcg            



     unit) unit)                                    (50,000           



     capsule capsule                                    unit)           



                                                  capsule           

 

     ipratropium ipratropium           No                       ipratropiu      

     [facility]



     0.5  0.5                                     m 0.5           da



     mg-albutero mg-albutero                                    mg-albuter      

     Medical



     l 3 mg (2.5 l 3 mg (2.5                                    ol 3 mg         

  Group



     mg base)/3 mg base)/3                                    (2.5 mg           



     mL   mL                                      base)/3 mL           



     nebulizatio nebulizatio                                    nebulizati      

     



     n soln USE n soln USE                                    on soln           



     1 VIAL IN 1 VIAL IN                                    USE 1 VIAL          

 



     NEBULIZER 4 NEBULIZER 4                                    IN             



     TIMES DAILY TIMES DAILY                                    NEBULIZER       

    



     AS NEEDED AS NEEDED                                    4 TIMES           



                                                  DAILY AS           



                                                  NEEDED           

 

     theophyllin theophyllin           No                       theophylli      

     [facility]



     e  mg e  mg                                    ne        

    da



     tablet,exte tablet,exte                                    mg             M

edical



     nded nded                                    tablet,ext           Group



     release,12 release,12                                    ended           



     hr TAKE 1 hr TAKE 1                                    release,12          

 



     TABLET BY TABLET BY                                    hr TAKE 1           



     MOUTH ONCE MOUTH ONCE                                    TABLET BY         

  



     DAILY DAILY                                    MOUTH ONCE           



                                                  DAILY           

 

     Trelegy Trelegy           No                       Trelegy           [facility]

r



     Ellipta 100 Ellipta 100                                    Ellipta         

  da



     mcg-62.5 mcg-62.5                                    100            Medical



     mcg-25 mcg mcg-25 mcg                                    mcg-62.5          

 Group



     powder for powder for                                    mcg-25 mcg        

   



     inhalation inhalation                                    powder for        

   



     INHALE 1 INHALE 1                                    inhalation           



     PUFF BY PUFF BY                                    INHALE 1           



     MOUTH ONCE MOUTH ONCE                                    PUFF BY           



     DAILY DAILY                                    MOUTH ONCE           



                                                  DAILY           

 

     zinc zinc           No                       zinc           [facility]



     sulfate 50 sulfate 50                                    sulfate 50        

   da



     mg zinc mg zinc                                    mg zinc           Medica

l



     (220 mg) (220 mg)                                    (220 mg)           Andrew

up



     capsule capsule                                    capsule           



     TAKE 1 TAKE 1                                    TAKE 1           



     CAPSULE BY CAPSULE BY                                    CAPSULE BY        

   



     MOUTH ONCE MOUTH ONCE                                    MOUTH ONCE        

   



     DAILY DAILY                                    DAILY           







Vital Signs







             Vital Name   Observation Time Observation Value Comments     Source

 

             BP Diastolic 2022 00:00:00 70 mm[Hg]                 [facility]

[facility]



                                                                 Group

 

             Height       2022 00:00:00 71 [in_i]                 [facility]

a [facility]



                                                                 Group

 

             BMI (Body Mass 2022 00:00:00 22 kg/m2                  [facility]



             Index)                                              Group

 

             BP Systolic  2022 00:00:00 121 mm[Hg]                [facility]

a [facility]



                                                                 Group

 

             Body Weight  2022 00:00:00 158 [lb_av]               [facility]

[facility]



                                                                 Group

 

             BP Diastolic 2022 00:00:00 88 mm[Hg]                 [facility]

a [facility]



                                                                 Group

 

             Height       2022 00:00:00 71 [in_i]                 [facility]

a [facility]



                                                                 Group

 

             BMI (Body Mass 2022 00:00:00 22.1 kg/m2                [facility]



             Index)                                              Group

 

             BP Systolic  2022 00:00:00 142 mm[Hg]                [facility]

a [facility]



                                                                 Group

 

             Body Weight  2022 00:00:00 158.6 [lb_av]              [facility]

da Medical



                                                                 Group







Procedures







                Procedure       Date / Time Performed Performing Clinician Sourc

e

 

                unlisted imaging order 2022 00:00:00                 Mercedes

[facility]



                                                                Group







Encounters







        Start   End     Encounter Admission Attending Care    Care    Encounter 

Source



        Date/Time Date/Time Type    Type    Clinicians Facility Department ID   

   

 

        2022-10-14 2022-10-14 Outpatient         Yan_W   [facility]     [id number]

022 [place]



        00:00:00 00:00:00                                         1014    da



                                                                        Medical



                                                                        Group

 

        2022 Outpatient         Yan_W   [facility]     [id number]

022 [facility]



        00:00:00 00:00:00                                         0911    da



                                                                        Medical



                                                                        Group

 

        2022 Outpatient         MARK, [facility]     021     [id number]

[address]



        00:00:00 00:00:00                 IRENE                  373     Method

i



                                                                        st

 

        2022 Outpatient         MARK [facility]     [id number]

[address]



        00:00:00 00:00:00                 IRENE                  825     [facility]

i



                                                                        st

 

        2022 Outpatient         Yan_W   [facility]     [facility]     [id number]

022 [place]



        00:00:00 00:00:00                                         0805    



                                                                        Medical



                                                                        Group

 

        2022 Outpatient         AMBRE[place] [address]

 [facility]



        00:00:00 00:00:00                 REYNA                    0728    [facility]



                                                                        Program

 

        2022 Outpatient         Yan_W   [facility]     [facility]     [id number]

022 [facility]



        05:23:00 05:23:00                                         0702    



                                                                        Medical



                                                                        Group

 

        2022 Outpatient         Yan_W   [facility]     [facility]     [id number]

022 [facility]



        04:47:00 04:47:00                                         0616    



                                                                        Medical



                                                                        Group

 

        2022 KATHRYN Concepcion     [place] -    [id number]

6 [facility]



        00:00:00 00:00:00 MD: Ramirez Humphreys         [facility],                         Network         Group



                        [address],                         [facility],                         Otolaryngol         



                        [facility]         



                        [address]                                         



                        , Ph.                                           



                        [phone]                                         

 

        2022-06-10 2022-06-10 Outpatient         Yan_W   [facility]     [facility]     [id number]

022 [place]



        04:57:00 04:57:00                                         0610    



                                                                        Medical



                                                                        Group

 

        2022 Outpatient         Yan_W   MM[facility]     MMG     [id number]

022 [place]



        04:54:00 04:54:00                                         0602    



                                                                        Medical



                                                                        Group

 

        2022 Outpatient         Yan_W   MM[facility]     MMG     [id number]

022 [place]



        04:54:00 04:54:00                                         0601    [facility]

 

        2022 Luis Felipe Cowart,                 [facility]     [place] -    [id number]

1 [place]



        00:00:00 00:00:00 MD: Ramirez Humphreys         [facility],                         Network         Group



                        [address],                         [facility],                         Otolaryngol         



                        [place]                              [place]         



                        [address]                                         



                        , Ph.                                           



                        [phone]                                         

 

        2022 Outpatient         Supa_W   [facility]     [id number]

022 [place]



        03:05:00 03:05:00                                         0526    [facility]

 

        2021 Outpatient         SUN,    [facility]     [id number]

[address]



        00:00:00 00:00:00                 JAKOB                 861     Method

i



                                                                        st

 

        2021 Outpatient         SUN,    [facility]     [id number]

[address]



        00:00:00 00:00:00                 JAKOB                 779     [facility]

i



                                                                        st

 

        2021 Outpatient         SUN,    [facility]     [id number]

[address]



        00:00:00 00:00:00                 JAKOB                 103     Method

i



                                                                        st

 

        2021 Outpatient         SUN,    [facility]     [id number]

[address]



        00:00:00 00:00:00                 JAKOB                 563     Method

i



                                                                        st

 

        2020 DEB Sorto    [id number]

52 



        00:00:00 00:00:00 (Out)           Madina DIXON   350.1.13.10         



                                                [facility] 4.2.7.2.686         



                                                        [phone]         



                                                        019             

 

        2020 DEB [place]    [id number]

52 [facility]



        00:00:00 00:00:00 (Out)           Madina DIXON   350.1.13.10         [facility] 4.2.7.2.686         Dylan

as



                                                        [phone]         [address]

 

        2020 Laboratory         Lab, [facility]    [id number]

[id number] 



        13:13:24 13:34:21 Only            Fam [facility] I [facility]  350.1.13.10         



                                                [place] 4.2.7.2.686         



                                                [facility] [phone]         



                                                nal     044             



                                                Office                  



                                                Building                 



                                                One                     

 

        2020 Laboratory         Lab, [facility] I [facility]    1.2.

[id number] 

Univers



        13:13:24 13:34:21 Only            Antoinette Hayes  350.1.13.10    

     ity of



                                                [place] 4.2.7.2.686         Dylan

as



                                                Professio [phone]         Me

dical



                                                nal     [address]             [address]



                                                Office                  



                                                Building                 



                                                One                     

 

        2020 Outpatient R       ABIGAIL  [facility]    [id number]

757 [facility]



        13:00:00 13:00:00                 ANTOINETTE                         rhonanikki [facility]







Results







           Test Description Test Time  Test Comments Results    Result Comments 

Source









                          SARS-CoV-2 (COVID-19) RNA [Presence] in Respiratory sp

ecimen by 2022 

21:22:50                                



                    JUANI with probe detection                     









                      Test Item  Value      Reference Range Interpretation Comme

nts









             SARS-CoV-2 (COVID-19) RNA [Presence] in Respiratory specimen by Not

 detected                           



             JUANI with probe detection (test code = 67936-1)                     

                   

 

             Whether patient is employed in a healthcare setting (test code = Un

known                                



             16333-8)                                            

 

             Whether the patient has symptoms related to condition of interest U

nknown                                



             (test code = 86761-1)                                        

 

             Whether the patient was hospitalized for condition of interest Unkn

own                                



             (test code = 04532-5)                                        

 

             Whether the patient was admitted to intensive care unit (ICU) for U

nknown                                



             condition of interest (test code = 07994-2)                        

                

 

             Whether patient resides in a congregate care setting (test code = U

nknown                                



             74224-3)                                            

 

             Pregnancy status (test code = 82810-3) Unknown                     

           

 

             Date and time of symptom onset (test code = 33163-4) Unknown       

                         



RAMIREZ [employer] [facility]

## 2022-11-18 NOTE — EDPHYS
Physician Documentation                                                                           

 [facility]                                                                 

Name: Jaxon Taylor                                                                             

Age: 62 yrs                                                                                       

Sex: Male                                                                                         

: 1960                                                                                   

MRN: [id number]                                                                                   

Arrival Date: 2022                                                                          

Time: 15:01                                                                                       

Account#: [id number]                                                                            

Bed 23                                                                                            

Private MD: Jackie Santos Atiq                                                                  

ED Physician Renzo Ferreira                                                                      

HPI:                                                                                              

                                                                                             

15:08 This 62 yrs old Male presents to ER via Wheelchair with complaints of Breathing         jmm 

      Difficulty.                                                                                 

15:08 The patient has shortness of breath at rest. Onset: The symptoms/episode began/occurred jmm 

      gradually. The patient's shortness of breath has no apparent modifying factors. This is     

      a 62 year old male with a history of copd, emphysema that presents to the ED with           

      complaints of shortness of breath worsening over the past week. patient attempted an        

      oral course of steroids and antibiotics without relief. .                                   

                                                                                                  

Historical:                                                                                       

- Allergies:                                                                                      

15:13 No Known Allergies;                                                                     vg1 

- Home Meds:                                                                                      

15:13 Albuterol Inhl [Active]; ProAir HFA inhalation [Active]; Symbicort inhalation [Active]; vg1 

- PMHx:                                                                                           

15:13 COPD; Emphysema;                                                                        vg1 

                                                                                                  

- Immunization history:: Client reports having NOT received the Covid vaccine.                    

- Social history:: Smoking status: Patient reports the use of cigarette tobacco                   

  products, smokes one pack cigarettes per day.                                                   

                                                                                                  

                                                                                                  

ROS:                                                                                              

15:08 Constitutional: Negative for fever, chills, and weight loss, Cardiovascular: Negative   jmm 

      for chest pain, palpitations, and edema.                                                    

15:08 Respiratory: Positive for shortness of breath.                                              

15:08 All other systems are negative.                                                             

                                                                                                  

Exam:                                                                                             

15:08 Constitutional:  This is a well developed, well nourished patient who is awake, alert,  jmm 

      and in no acute distress. Head/Face:  atraumatic. Eyes:  EOMI, no conjunctival erythema     

      appreciated ENT:  Moist Mucus Membranes Neck:  Trachea midline, Supple Chest/axilla:        

      Normal chest wall appearance and motion.   Cardiovascular:  Regular rate and rhythm.        

      No edema appreciated                                                                        

15:08 Back:  Normal ROM Skin:  General appearance color normal MS/ Extremity:  Moves all          

      extremities, no obvious deformities appreciated, no edema noted to the lower                

      extremities  Neuro:  Awake and alert Psych:  Behavior is normal, Mood is normal,            

      Patient is cooperative and pleasant                                                         

15:08 Respiratory: moderate respiratory distress is noted,  Respirations: labored breathing,      

      that is moderate, Breath sounds: wheezing: that is severe, is heard diffusely.              

                                                                                                  

Vital Signs:                                                                                      

15:11  / 87; Pulse 114; Resp 30; Temp 98; Pulse Ox 97% on 4 lpm NC; Weight 74.84 kg;    bp  

      Height 5 ft. 11 in. (180.34 cm);                                                            

16:00  / 75; Pulse 104; Resp 19; Pulse Ox 100% on 4 lpm NC;                             bp  

17:50  / 75; Pulse 102; Resp 16; Pulse Ox 99% ;                                         bp  

18:36  / 105; Pulse 100; Resp 24; Pulse Ox 100% ;                                       bp  

19:21 BP 96 / 81; Pulse 95; Resp 16; Pulse Ox 99% on 40% BiPAP;                               aa5 

20:32  / 82; Pulse 103; Resp 19; Pulse Ox 98% on 6 lpm NC;                              hb  

15:11 Body Mass Index 23.01 (74.84 kg, 180.34 cm)                                             bp  

                                                                                                  

MDM:                                                                                              

15:08 Patient medically screened.                                                             [facility] 

18:24 Data reviewed: vital signs, nurses notes. Counseling: I had a detailed discussion with  jody 

      the patient and/or guardian regarding: the historical points, exam findings, and any        

      diagnostic results supporting the discharge/admit diagnosis, lab results, radiology         

      results, the need for further work-up and treatment in the hospital. ED course: I           

      discussed the patient with Ricky Mcginnis whom accepted the patient to Dr. Briceno's             

      service. .                                                                                  

                                                                                                  

                                                                                             

15:09 Order name: Basic Metabolic Panel; Complete Time: 16:40                                 [facility] 

                                                                                             

15:09 Order name: CBC with Diff; Complete Time: 16:21                                         [facility] 

                                                                                             

15:09 Order name: LFT's; Complete Time: 16:40                                                 [facility] 

                                                                                             

15:09 Order name: Magnesium; Complete Time: 16:40                                             [facility] 

                                                                                             

15:09 Order name: NT PRO-BNP; Complete Time: 16:40                                            [facility] 

                                                                                             

15:09 Order name: PT-INR; Complete Time: 16:19                                                [facility] 

                                                                                             

15:09 Order name: Troponin HS; Complete Time: 16:40                                           [facility] 

                                                                                             

15:09 Order name: XRAY Chest (1 view); Complete Time: 16:06                                   [facility] 

                                                                                             

15:09 Order name: COVID-19/FLU A+B; Complete Time: 16:45                                      [facility] 

                                                                                             

15:10 Order name: Blood Culture Adult (2)                                                     [facility] 

                                                                                             

15:10 Order name: Lactate w/ 2H reflex if indic.; Complete Time: 16:32                        [facility] 

                                                                                             

16:40 Order name: BIPAP                                                                       [facility] 

                                                                                             

18:35 Order name: ABG: VBG; Complete Time: 20:15                                              la1 

                                                                                             

19:36 Order name: Lactate Sepsis 2 HR Follow-up; Complete Time: 19:40                         [facility]

                                                                                             

15:09 Order name: EKG; Complete Time: 15:11                                                   [facility] 

                                                                                             

15:09 Order name: Cardiac monitoring; Complete Time: 15:44                                    [facility] 

                                                                                             

15:09 Order name: EKG - Nurse/Tech; Complete Time: 15:44                                      [facility] 

                                                                                             

15:09 Order name: IV Saline Lock; Complete Time: 15:44                                        [facility] 

                                                                                             

15:09 Order name: Labs collected and sent; Complete Time: 15:44                               [facility] 

                                                                                             

15:09 Order name: O2 Per Protocol; Complete Time: 15:10                                       [facility] 

                                                                                             

15:09 Order name: O2 Sat Monitoring; Complete Time: 15:10                                     [facility] 

                                                                                                  

Administered Medications:                                                                         

15:30 Drug: Magnesium Sulfate 1 grams Route: IVPB; Infused Over: 1 hrs; Site: left            bp  

      antecubital;                                                                                

15:30 Drug: Xopenex (levalbuterol) (3) 1.25 mg Route: Inhalation;                             bp  

15:30 Drug: SOLU-Medrol (methylPrednisoLONE) 125 mg Route: IVP; Site: left antecubital;       bp  

19:21 Drug: LevaQUIN (levofloxacin) 750 mg Volume: 150 ml; Route: IVPB; Infused Over: 90      aa5 

      mins; Site: left antecubital;                                                               

                                                                                                  

                                                                                                  

Disposition Summary:                                                                              

22 18:27                                                                                    

Hospitalization Ordered                                                                           

      Hospitalization Status: Inpatient Admission                                             jody 

      Provider: Tony Briceno 

      Location: Telemetry/MedSurg (Inpatient)                                                 jm 

      Condition: Stable                                                                       jmm 

      Problem: new                                                                            jmm 

      Symptoms: have improved                                                                 jmm 

      Bed/Room Type: Standard                                                                 [facility] 

      Room Assignment: 228(22 20:31)                                                      

      Diagnosis                                                                                   

        - COPD/ Chronic obstructive pulmonary disease with (acute) exacerbation               jmm 

      Forms:                                                                                      

        - Medication Reconciliation Form                                                      jmm 

        - SBAR form                                                                           [facility] 

Addendum:                                                                                         

2022                                                                                        

     13:41 Co-signature as Attending Physician, Renzo Ferreira MD I agree with the assessment and  c
ha

           plan of care.                                                                          

                                                                                                  

Signatures:                                                                                       

Dispatcher [employer]                           EDRenzo Espinosa MD MD cha Mickail, Joel, PA PA jmm Calderon, Audri, JAVI                     RN   aa5                                                  

Ricky Mcginnis, FNP-C                      FNP-Cla1                                                  

Elana Velarde RN                       RN   Shayne Cooper RN RN bp Garcia, Victoria, RN                    RN   1                                                  

Des Ibanez                                9                                                  

                                                                                                  

Corrections: (The following items were deleted from the chart)                                    

                                                                                             

20:31 18:27 naseem                                                                                 

                                                                                                  

**************************************************************************************************

## 2022-11-18 NOTE — RAD REPORT
EXAM DESCRIPTION:  RAD - Chest Single View - 11/18/2022 3:46 pm

 

CLINICAL HISTORY:  SOB

 

COMPARISON:  Chest Single View dated 6/6/2021; Chest Single View dated 1/3/2021; Chest Single View da
geovany 10/1/2020; Chest Pa And Lat (2 Views) dated 6/17/2019; Chest Angio dated 6/7/2021

 

FINDINGS:  Lines: None.

Lungs: Scattered irregular reticulonodular opacities are present bilaterally. No consolidation or shiloh
ma.

Pleural: No significant pleural effusions or pneumothorax.

Cardiac: The heart size is within normal limits.

Mediastinum: Within normal limits.

Bones: No acute fractures.

Other: None

 

IMPRESSION:  Scattered nonspecific reticulonodular opacities that could reflect pneumonia. Background
 of advanced emphysema.

## 2022-11-18 NOTE — ER
Nurse's Notes                                                                                     

 [facility]                                                                 

Name: Jaxon Taylor                                                                             

Age: 62 yrs                                                                                       

Sex: Male                                                                                         

: 1960                                                                                   

MRN: [id number]                                                                                   

Arrival Date: 2022                                                                          

Time: 15:01                                                                                       

Account#: [id number]                                                                            

Bed 23                                                                                            

Private MD: Jackie Santos Atiq                                                                  

Diagnosis: COPD/ Chronic obstructive pulmonary disease with (acute) exacerbation                  

                                                                                                  

Presentation:                                                                                     

                                                                                             

15:11 Chief complaint: Patient states: SOB/ difficulty breathing x 2 weeks. Uses O2 at home 6 vg1 

      L NC, stated "If i dont have it at this rate I will drop into the 80s". Coronavirus         

      screen: Vaccine status: Patient reports being unvaccinated. Client denies travel out of     

      the [place] in the last 14 days. Ebola Screen: Patient negative for fever greater than or      

      equal to 101.5 degrees Fahrenheit, and additional compatible Ebola Virus Disease            

      symptoms. Initial Sepsis Screen: Does the patient meet any 2 criteria? RR > 20 per min.     

      HR > 90 bpm. Yes Does the patient have a suspected source of infection? No. Patient's       

      initial sepsis screen is negative. Risk Assessment: Do you want to hurt yourself or         

      someone else? Patient reports no desire to harm self or others. Onset of symptoms was       

      2022.                                                                          

15:11 Method Of Arrival: Wheelchair                                                           vg1 

15:11 Acuity: FROILAN 2                                                                           vg1 

                                                                                                  

Triage Assessment:                                                                                

15:13 General: Appears uncomfortable, Behavior is cooperative. Respiratory: Reports shortness vg1 

      of breath at rest on exertion air hunger Airway is patent Respiratory effort is even,       

      labored, gasping, Respiratory pattern is tachypnea pt appears to be grunting Onset: The     

      symptoms/episode began/occurred x 2 weeks, the patient has moderate shortness of breath.    

                                                                                                  

Historical:                                                                                       

- Allergies:                                                                                      

15:13 No Known Allergies;                                                                     vg1 

- Home Meds:                                                                                      

15:13 Albuterol Inhl [Active]; ProAir HFA inhalation [Active]; Symbicort inhalation [Active]; vg1 

- PMHx:                                                                                           

15:13 COPD; Emphysema;                                                                        vg1 

                                                                                                  

- Immunization history:: Client reports having NOT received the Covid vaccine.                    

- Social history:: Smoking status: Patient reports the use of cigarette tobacco                   

  products, smokes one pack cigarettes per day.                                                   

                                                                                                  

                                                                                                  

Screening:                                                                                        

15:30 Abuse screen: Denies threats or abuse. Denies injuries from another. Nutritional        bp  

      screening: No deficits noted. Tuberculosis screening: No symptoms or risk factors           

      identified. Fall Risk None identified.                                                      

                                                                                                  

Assessment:                                                                                       

15:15 General: SEE TRIAGE NOTE.                                                               bp  

16:00 Pain: Denies pain. Cardiovascular: Rhythm is sinus tachycardia. Respiratory: Airway is  bp  

      patent Respiratory effort is labored, Breath sounds with wheezes bilaterally.               

17:54 Reassessment: PT PLACED ON BIPAP.                                                       bp  

18:37 Reassessment: ADMIT INITIATED. HOSPITALIST AT B/S.                                      bp  

19:21 General: Reports "This damn mask thing isn't helping much. I am just going to take it   aa5 

      off here in a second." Patient advised against said action. Neuro: Level of                 

      Consciousness is awake, alert, obeys commands, Oriented to person, place, time,             

      situation.                                                                                  

20:30 Reassessment: Patient and/or family updated on plan of care and expected duration. Pain hb  

      level reassessed. Pt awake and alert sitting up in stretcher using cell phone, SpO2 98%     

      on 6LNC. Awaiting admission orders and bed assignment at this time.                         

                                                                                                  

Vital Signs:                                                                                      

15:11  / 87; Pulse 114; Resp 30; Temp 98; Pulse Ox 97% on 4 lpm NC; Weight 74.84 kg;    bp  

      Height 5 ft. 11 in. (180.34 cm);                                                            

16:00  / 75; Pulse 104; Resp 19; Pulse Ox 100% on 4 lpm NC;                             bp  

17:50  / 75; Pulse 102; Resp 16; Pulse Ox 99% ;                                         bp  

18:36  / 105; Pulse 100; Resp 24; Pulse Ox 100% ;                                       bp  

19:21 BP 96 / 81; Pulse 95; Resp 16; Pulse Ox 99% on 40% BiPAP;                               aa5 

20:32  / 82; Pulse 103; Resp 19; Pulse Ox 98% on 6 lpm NC;                              hb  

15:11 Body Mass Index 23.01 (74.84 kg, 180.34 cm)                                             bp  

                                                                                                  

ED Course:                                                                                        

15:01 Patient arrived in ED.                                                                  mr  

15:01 Jackie Santos MD is Private Physician.                                                mr  

15:06 Maico Pacheco PA is PHCP.                                                              [facility] 

15:06 Renzo Ferreira MD is Attending Physician.                                             [facility] 

15:08 Shayne Christopher, JAVI is Primary Nurse.                                                    bp  

15:13 Triage completed.                                                                       vg1 

15:13 Arm band placed on.                                                                     vg1 

15:30 Patient has correct armband on for positive identification. Bed in low position. Call   bp  

      light in reach. Side rails up X2.                                                           

15:45 Inserted saline lock: 20 gauge in left antecubital area, using aseptic technique. Blood bp  

      collected.                                                                                  

15:48 XRAY Chest (1 view) In Process Unspecified.                                             EDMS

18:27 Tony Briceno is Hospitalizing Provider.                                               [facility] 

19:21 Awaiting bed assignment.                                                                aa5 

19:21 Client placed on continuous cardiac and pulse oximetry monitoring. NIBP monitoring      aa5 

      applied. Door closed. Noise minimized. Moved to private room. Warm blanket given.           

      Verbal reassurance given.                                                                   

19:21 Blood Culture Adult (2) Sent.                                                           aa5 

19:47 PHCP role handed off by Maico Pacheco PA                                               jl9 

19:47 Des Ibanez is PHCP.                                                                  jl9 

20:56 Primary Nurse role handed off by Shayne Christopher RN                                       

                                                                                                  

Administered Medications:                                                                         

15:30 Drug: Magnesium Sulfate 1 grams Route: IVPB; Infused Over: 1 hrs; Site: left            bp  

      antecubital;                                                                                

15:30 Drug: Xopenex (levalbuterol) (3) 1.25 mg Route: Inhalation;                             bp  

15:30 Drug: SOLU-Medrol (methylPrednisoLONE) 125 mg Route: IVP; Site: left antecubital;       bp  

19:21 Drug: LevaQUIN (levofloxacin) 750 mg Volume: 150 ml; Route: IVPB; Infused Over: 90      aa5 

      mins; Site: left antecubital;                                                               

                                                                                                  

                                                                                                  

Medication:                                                                                       

20:32 VIS not applicable for this client.                                                       

                                                                                                  

Outcome:                                                                                          

18:27 Decision to Hospitalize by Provider.                                                    [facility] 

21:57 Patient left the ED.                                                                    bb  

                                                                                                  

Signatures:                                                                                       

Dispatcher [employer]                           EDMS                                                 

Maico Pacheco PA PA jmm                                                  

Rita Leal                                                   

Chantel Mandujano RN                     RN   bb                                                   

Anayeli Olivarez, RN                     RN   aa5                                                  

Grace Daniel RN RN hb Peltier, Brian, RN                      RN   Jolie Cuevas, RN                    RN   vg1                                                  

Heather Pedersen                                                                                    

Des Ibanez                                jl9                                                  

                                                                                                  

Corrections: (The following items were deleted from the chart)                                    

15:44 15:11  / 87; Pulse 114bpm; Resp 30bpm; Pulse Ox 97% 4 lpm Nasal Cannula; 74.84    bp  

      kg; Height 5 ft. 11 in.; BMI: 23.0; vg1                                                     

18:37 18:37 Reassessment: ADMIT INITIATED bp                                                  bp  

                                                                                                  

**************************************************************************************************

## 2022-11-18 NOTE — P.HP
Certification for Inpatient


Patient admitted to: Inpatient


With expected LOS: >2 Midnights


Patient will require the following post-hospital care: None


Practitioner: I am a practitioner with admitting privileges, knowledge of 

patient current condition, hospital course, and medical plan of care.


Services: Services provided to patient in accordance with Admission requirements

found in Title 42 Section 412.3 of the Code of Federal Regulations





Patient History


Date of Service: 11/18/22


Reason for admission:  COPD exacerbation, pneumonia


History of Present Illness: 





62-year-old male with history of COPD, emphysema on chronic home O2, chronic 

steroids presents the emergency department for worsening shortness of breath.  

He reports 1 week of worsening dyspnea, gray sputum.  At home he is on oxygen 2 

L per nasal cannula which he has had increased to 6 L today to maintain 

saturations greater than 90, prednisone 10 mg daily, Trelegy, theophylline, 

nebs.  He was given IV steroids, IV magnesium, placed on BiPAP in ED.  His labs 

were significant for leukocytosis with white blood cell count of 15.7 bicarb 33 

on chemistry lactic acid 2.1 initially chest x-ray showed scattered nonspecific 

reticulonodular opacities that could reflect pneumonia.  Background of advanced 

emphysema.  SIRS criteria present including tachycardia, tachypnea, leukocytosis

with source infection identified on chest x-ray and lactic acid greater than 2 

meets criteria for severe sepsis also needs BiPAP.  Will admit for further 

evaluation and management of severe sepsis, pneumonia, COPD exacerbation.


Allergies





No Known Allergies Allergy (Verified 10/02/20 01:41)


   





Home Medications: 








Fluticasone/Umeclidin/Vilanter [Trelegy Ellipta 100-62.5-25] 1 each IH DAILY 

01/04/21 


Theophylline [Hitesh-Dur*] 300 mg PO DAILY 06/06/21 


levoFLOXacin [Levaquin*] 750 mg PO DAILY #7 tab 06/07/21 


predniSONE [Deltasone*] 1 tab PO SEECOM #21 tab 06/07/21 








- Past Medical/Surgical History


Diabetic: No


-: COPD - emphysema on home oxygen and chronic steroids


-: History of tobacco abuse


-: esophageal stricture/dysphagia


-: mastoidectomy


-: appendectomy


Psychosocial/ Personal History: Patient lives at home with his wife and 2 

daughters and is currently working at [employer].





- Family History


  ** Mother


-: Heart disease, Hypertension





  ** Father


-: Heart disease, Hypertension, Stroke





- Social History


Smoking Status: Former smoker


Alcohol use: No


CD- Drugs: No


Caffeine use: Yes


Place of Residence: Home





Review of Systems


10-point ROS is otherwise unremarkable


Respiratory: Cough, Shortness of Breath, SOB with Excertion, Sputum, Wheezing, 

As per HPI





Physical Examination





- Physical Exam


General: Alert, In no apparent distress, Oriented x3


HEENT: Atraumatic, PERRLA, Mucous membr. moist/pink, EOMI, Sclerae nonicteric


Neck: Supple, 2+ carotid pulse no bruit, No LAD, Without JVD or thyroid 

abnormality


Respiratory: Diminished, Expiratory wheezes


Cardiovascular: Regular rate/rhythm, Normal S1 S2


Capillary refill: <2 Seconds


Gastrointestinal: Normal bowel sounds, No tenderness


Musculoskeletal: No tenderness


Integumentary: No rashes


Neurological: Normal gait, Normal speech, Normal strength at 5/5 x4 extr, Normal

 tone, Normal affect


Lymphatics: No axilla or inguinal lymphadenopathy





- Studies


Laboratory Data (last 24 hrs)





11/18/22 16:00: PT 10.9, INR 0.99


11/18/22 16:00: WBC 15.70 H, Hgb 14.5, Hct 45.3, Plt Count 352


11/18/22 16:00: Sodium 138, Potassium 3.9, BUN 12, Creatinine 0.81, Glucose 99, 

Magnesium 2.2, Total Bilirubin 0.4, AST 9 L, ALT 21, Alkaline Phosphatase 70








Assessment and Plan





- Plan


Assessment:


Acute on chronic hypoxic respiratory failure


COPD with exacerbation on chronic, O2/chronic steroids


Severe sepsis secondary to pneumonia











Plan:


Acute on chronic hypoxic respiratory failure: Continue supplemental oxygen as 

needed, BiPAP as needed, VBG pending to assess for hypercapnia, bicarb elevated 

on chemistry.  Continue IV steroids, as needed nebulizer treatments, pulmonology

 consult.  Pneumonia likely contributing, Levaquin ordered, sputum culture 

ordered.


COPD with exacerbation on chronic, O2/chronic steroids: Continue as above


Severe sepsis secondary to pneumonia: SIRS criteria present including 

tachycardia, tachypnea, leukocytosis source of infection identified on chest x-

ray with pneumonia.  Initial lactic acid two-point 1 repeat pending.  We will 

trend lactate until downtrending.  Lactic acidosis likely multifactorial from 

sepsis, hypoxia, type B from albuterol nebulizer treatments.  Appreciate further

 input from pulmonology.  Will obtain sputum culture.





DVT PPX: Lovenox


Code status: Full


Discharge Plan: Home


Plan to discharge in: 72 Hours





- Advance Directives


Does patient have a Living Will: No


Does patient have a Durable POA for Healthcare: No





- Code Status/Comfort Care


Code Status Assessed: Yes (Full code)


Critical Care: No


Time Spent Managing Pts Care (In Minutes): 70

## 2022-11-19 LAB
ALBUMIN SERPL BCP-MCNC: 3.2 G/DL (ref 3.4–5)
ALP SERPL-CCNC: 67 U/L (ref 45–117)
ALT SERPL W P-5'-P-CCNC: 21 U/L (ref 12–78)
AST SERPL W P-5'-P-CCNC: 11 U/L (ref 15–37)
BUN BLD-MCNC: 15 MG/DL (ref 7–18)
GLUCOSE SERPLBLD-MCNC: 145 MG/DL (ref 74–106)
HCT VFR BLD CALC: 42.8 % (ref 39.6–49)
LYMPHOCYTES # SPEC AUTO: 0.6 K/UL (ref 0.7–4.9)
MAGNESIUM SERPL-MCNC: 2.4 MG/DL (ref 1.8–2.4)
MCV RBC: 88.8 FL (ref 80–100)
PMV BLD: 8 FL (ref 7.6–11.3)
POTASSIUM SERPL-SCNC: 4.8 MMOL/L (ref 3.5–5.1)
RBC # BLD: 4.82 M/UL (ref 4.33–5.43)

## 2022-11-19 RX ADMIN — ENOXAPARIN SODIUM SCH: 40 INJECTION SUBCUTANEOUS at 08:01

## 2022-11-19 RX ADMIN — METHYLPREDNISOLONE SODIUM SUCCINATE SCH MG: 40 INJECTION, POWDER, FOR SOLUTION INTRAMUSCULAR; INTRAVENOUS at 08:01

## 2022-11-19 RX ADMIN — METHYLPREDNISOLONE SODIUM SUCCINATE SCH MG: 40 INJECTION, POWDER, FOR SOLUTION INTRAMUSCULAR; INTRAVENOUS at 16:27

## 2022-11-19 RX ADMIN — ALBUTEROL SULFATE PRN MG: 2.5 SOLUTION RESPIRATORY (INHALATION) at 00:35

## 2022-11-19 RX ADMIN — METHYLPREDNISOLONE SODIUM SUCCINATE SCH MG: 40 INJECTION, POWDER, FOR SOLUTION INTRAMUSCULAR; INTRAVENOUS at 00:22

## 2022-11-19 RX ADMIN — IPRATROPIUM BROMIDE PRN MG: 0.5 SOLUTION RESPIRATORY (INHALATION) at 00:35

## 2022-11-19 RX ADMIN — ALBUTEROL SULFATE PRN MG: 2.5 SOLUTION RESPIRATORY (INHALATION) at 06:00

## 2022-11-19 RX ADMIN — Medication SCH ML: at 08:01

## 2022-11-19 RX ADMIN — Medication SCH ML: at 00:22

## 2022-11-19 RX ADMIN — Medication SCH ML: at 21:00

## 2022-11-19 RX ADMIN — IPRATROPIUM BROMIDE PRN MG: 0.5 SOLUTION RESPIRATORY (INHALATION) at 06:00
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
## 2022-11-19 NOTE — P.PN
Subjective


Date of Service: 11/19/22


Chief Complaint:  COPD exacerbation, pneumonia


Patient states he feels better today but not back to baseline.


He was upset with heart healthy diet.








Physical Examination





- Vital Signs


Temperature: 98.6 F


Blood Pressure: 118/66


Pulse: 89


Respirations: 18


Pulse Ox (%): 100





- Studies


Laboratory Data (last 24 hrs)





11/18/22 16:00: PT 10.9, INR 0.99


11/18/22 16:00: WBC 15.70 H, Hgb 14.5, Hct 45.3, Plt Count 352


11/18/22 16:00: Sodium 138, Potassium 3.9, BUN 12, Creatinine 0.81, Glucose 99, 

Magnesium 2.2, Total Bilirubin 0.4, AST 9 L, ALT 21, Alkaline Phosphatase 70








Assessment And Plan





- Current Problems (Diagnosis)


(1) COPD with acute exacerbation


Current Visit: No   Status: Acute   





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


Current Visit: No   Status: Acute   





(3) Acute respiratory failure with hypoxia


Current Visit: Yes   Status: Acute   





(4) Pneumonia


Current Visit: Yes   Status: Acute   





- Plan


Scheduled bronchodilators.


Oral prednisone


Levaquin 760 mg daily.


Pulmonary input appreciated.


Overall clinically improved.

## 2022-11-20 VITALS — SYSTOLIC BLOOD PRESSURE: 109 MMHG | DIASTOLIC BLOOD PRESSURE: 53 MMHG | TEMPERATURE: 98.7 F

## 2022-11-20 VITALS — OXYGEN SATURATION: 100 %

## 2022-11-20 LAB
ALBUMIN SERPL BCP-MCNC: 3.2 G/DL (ref 3.4–5)
ALP SERPL-CCNC: 67 U/L (ref 45–117)
ALT SERPL W P-5'-P-CCNC: 22 U/L (ref 12–78)
AST SERPL W P-5'-P-CCNC: 8 U/L (ref 15–37)
BUN BLD-MCNC: 16 MG/DL (ref 7–18)
GLUCOSE SERPLBLD-MCNC: 140 MG/DL (ref 74–106)
HCT VFR BLD CALC: 43.9 % (ref 39.6–49)
LYMPHOCYTES # SPEC AUTO: 0.8 K/UL (ref 0.7–4.9)
MAGNESIUM SERPL-MCNC: 2.4 MG/DL (ref 1.8–2.4)
MCV RBC: 88.7 FL (ref 80–100)
PMV BLD: 8.6 FL (ref 7.6–11.3)
POTASSIUM SERPL-SCNC: 4.5 MMOL/L (ref 3.5–5.1)
RBC # BLD: 4.95 M/UL (ref 4.33–5.43)

## 2022-11-20 RX ADMIN — METHYLPREDNISOLONE SODIUM SUCCINATE SCH MG: 40 INJECTION, POWDER, FOR SOLUTION INTRAMUSCULAR; INTRAVENOUS at 00:58

## 2022-11-20 RX ADMIN — METHYLPREDNISOLONE SODIUM SUCCINATE SCH MG: 40 INJECTION, POWDER, FOR SOLUTION INTRAMUSCULAR; INTRAVENOUS at 07:54

## 2022-11-20 RX ADMIN — Medication SCH ML: at 07:54

## 2022-11-20 RX ADMIN — ENOXAPARIN SODIUM SCH: 40 INJECTION SUBCUTANEOUS at 07:54

## 2022-11-20 NOTE — P.DS
Admission Date: 11/18/22


Discharge Date: 11/20/22


Disposition: ROUTINE DISCHARGE


Discharge Condition: FAIR


Reason for Admission:  COPD exacerbation, pneumonia





- Problems


(1) COPD with acute exacerbation


Status: Acute   





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


Status: Acute   





(3) Acute respiratory failure with hypoxia


Status: Acute   





(4) Pneumonia


Status: Acute   


Brief History of Present Illness: 


62-year-old male with history of COPD, emphysema on chronic home O2, chronic 

steroids presents the emergency department for worsening shortness of breath.  

He reports 1 week of worsening dyspnea, gray sputum.  At home he is on oxygen 2 

L per nasal cannula which he increased to 6 L today to maintain saturations 

greater than 90. He is on prednisone 10 mg daily, Trelegy, theophylline, nebs at

home.  He was given IV steroids, IV magnesium, placed on BiPAP in ED.  His labs 

were significant for leukocytosis with white blood cell count of 15.7 bicarb 33 

on chemistry lactic acid 2.1 initially chest x-ray showed scattered nonspecific 

reticulonodular opacities that could reflect pneumonia.  Background of advanced 

emphysema.  SIRS criteria present including tachycardia, tachypnea, leukocytosis

with source infection identified on chest x-ray and lactic acid greater than 2 

meets criteria for severe sepsis.  Patient admitted for further management.





Hospital Course: 


Patient admitted to the medical floor and treated for COPD exacerbation with 

steroids, nebs, bronchodilators and Levaquin.  He clinically improved with 

treatment.  Patient was weaned off BiPAP to oxygen by nasal and later weaned to 

baseline oxygen.  Wheezing has resolved, patient states he is feeling better.  

He was already dressed up to go home this morning.  He is discharged with an 

extra dose of steroid-20 mg daily daily x7 days, then continue with his usual 

prednisone 10 mg daily.  He is also prescribed Levaquin to continue treatment 

for infection bronchitis and possible pneumonia.





Vital Signs/Physical Exam: 














Temp Pulse Resp BP Pulse Ox


 


 98.1 F   74   15   111/56 L  100 


 


 11/20/22 04:00  11/20/22 04:00  11/20/22 04:00  11/20/22 04:00  11/20/22 04:00








General: Alert, In no apparent distress


HEENT: Mucous membr. moist/pink


Neck: Supple, JVD not distended


Respiratory: Clear to auscultation bilaterally, Normal air movement


Cardiovascular: No edema, Regular rate/rhythm, Normal S1 S2


Gastrointestinal: Normal bowel sounds, Soft and benign, Non-distended, No 

tenderness


Musculoskeletal: No swelling


Integumentary: No rashes


Neurological: Normal strength at 5/5 x4 extr


Laboratory Data at Discharge: 














WBC  19.90 K/uL (4.3-10.9)  H  11/20/22  03:21    


 


Hgb  14.1 g/dL (13.6-17.9)   11/20/22  03:21    


 


Hct  43.9 % (39.6-49.0)   11/20/22  03:21    


 


Plt Count  380 K/uL (152-406)   11/20/22  03:21    


 


PT  10.9 SECONDS (9.5-12.5)   11/18/22  16:00    


 


INR  0.99   11/18/22  16:00    


 


Sodium  134 mmol/L (136-145)  L  11/20/22  03:21    


 


Potassium  4.5 mmol/L (3.5-5.1)   11/20/22  03:21    


 


BUN  16 mg/dL (7-18)   11/20/22  03:21    


 


Creatinine  0.87 mg/dL (0.55-1.3)   11/20/22  03:21    


 


Glucose  140 mg/dL ()  H  11/20/22  03:21    


 


Magnesium  2.4 mg/dL (1.8-2.4)   11/20/22  03:21    


 


Total Bilirubin  0.5 mg/dL (0.2-1.0)   11/20/22  03:21    


 


AST  8 U/L (15-37)  L  11/20/22  03:21    


 


ALT  22 U/L (12-78)   11/20/22  03:21    


 


Alkaline Phosphatase  67 U/L ()   11/20/22  03:21    








Home Medications: 








Fluticasone/Umeclidin/Vilanter [Trelegy Ellipta 100-62.5-25] 1 each IH DAILY 

01/04/21 


Theophylline [Hitesh-Dur*] 300 mg PO BEDTIME 06/06/21 


Benzonatate [Tessalon Perle*] 100 mg PO TID PRN #30 cap 11/20/22 


levoFLOXacin [Levaquin*] 750 mg PO DAILY #6 tab 11/20/22 


predniSONE [Deltasone*] 10 mg PO DAILY #30 tab 11/20/22 





New Medications: 


predniSONE [Deltasone*] 10 mg PO DAILY #30 tab


levoFLOXacin [Levaquin*] 750 mg PO DAILY #6 tab


Benzonatate [Tessalon Perle*] 100 mg PO TID PRN #30 cap


 PRN Reason: Cough


Diet: AHA


Activity: Ad radha


Followup: 


Jackie Santos MD [Primary Care Provider] - 1-2 Weeks


Time spent managing pt's care (in minutes): 33

## 2022-11-21 NOTE — EKG
Test Date:    2022-11-18               Test Time:    15:14:40

Technician:   BP                                     

                                                     

MEASUREMENT RESULTS:                                       

Intervals:                                           

Rate:         107                                    

MN:           144                                    

QRSD:         108                                    

QT:           334                                    

QTc:          445                                    

Axis:                                                

P:            72                                     

MN:           144                                    

QRS:          117                                    

T:            64                                     

                                                     

INTERPRETIVE STATEMENTS:                                       

                                                     

Sinus tachycardia with frequent and consecutive premature ventricular

complexes

Right bundle branch block

Abnormal ECG

Compared to ECG 06/06/2021 14:25:35

Ventricular premature complex(es) now present



Electronically Signed On 11-21-22 15:31:46 CST by Emmanuel Rios

## 2023-02-16 ENCOUNTER — HOSPITAL ENCOUNTER (EMERGENCY)
Dept: HOSPITAL 97 - ER | Age: 63
Discharge: HOME | End: 2023-02-16
Payer: COMMERCIAL

## 2023-02-16 VITALS — DIASTOLIC BLOOD PRESSURE: 85 MMHG | OXYGEN SATURATION: 95 % | SYSTOLIC BLOOD PRESSURE: 160 MMHG

## 2023-02-16 DIAGNOSIS — Z20.822: ICD-10-CM

## 2023-02-16 DIAGNOSIS — Z72.0: ICD-10-CM

## 2023-02-16 DIAGNOSIS — J44.1: Primary | ICD-10-CM

## 2023-02-16 LAB
ALBUMIN SERPL BCP-MCNC: 3.4 G/DL (ref 3.4–5)
ALP SERPL-CCNC: 62 U/L (ref 45–117)
ALT SERPL W P-5'-P-CCNC: 13 U/L (ref 16–61)
AST SERPL W P-5'-P-CCNC: 10 U/L (ref 15–37)
BUN BLD-MCNC: 13 MG/DL (ref 7–18)
GLUCOSE SERPLBLD-MCNC: 102 MG/DL (ref 74–106)
HCT VFR BLD CALC: 43.4 % (ref 39.6–49)
INR BLD: 1.03
LYMPHOCYTES # SPEC AUTO: 2.2 K/UL (ref 0.7–4.9)
MAGNESIUM SERPL-MCNC: 2.1 MG/DL (ref 1.6–2.4)
MCV RBC: 87.8 FL (ref 80–100)
NT-PROBNP SERPL-MCNC: 34 PG/ML (ref ?–125)
PMV BLD: 7.4 FL (ref 7.6–11.3)
POTASSIUM SERPL-SCNC: 4 MMOL/L (ref 3.5–5.1)
RBC # BLD: 4.94 M/UL (ref 4.33–5.43)
SARS-COV-2 RNA RESP QL NAA+PROBE: NEGATIVE
TROPONIN I SERPL HS-MCNC: 4.5 PG/ML (ref ?–58.9)

## 2023-02-16 PROCEDURE — 71045 X-RAY EXAM CHEST 1 VIEW: CPT

## 2023-02-16 PROCEDURE — 85610 PROTHROMBIN TIME: CPT

## 2023-02-16 PROCEDURE — 80076 HEPATIC FUNCTION PANEL: CPT

## 2023-02-16 PROCEDURE — 96374 THER/PROPH/DIAG INJ IV PUSH: CPT

## 2023-02-16 PROCEDURE — 84484 ASSAY OF TROPONIN QUANT: CPT

## 2023-02-16 PROCEDURE — 83880 ASSAY OF NATRIURETIC PEPTIDE: CPT

## 2023-02-16 PROCEDURE — 83735 ASSAY OF MAGNESIUM: CPT

## 2023-02-16 PROCEDURE — 36415 COLL VENOUS BLD VENIPUNCTURE: CPT

## 2023-02-16 PROCEDURE — 93005 ELECTROCARDIOGRAM TRACING: CPT

## 2023-02-16 PROCEDURE — 80048 BASIC METABOLIC PNL TOTAL CA: CPT

## 2023-02-16 PROCEDURE — 99285 EMERGENCY DEPT VISIT HI MDM: CPT

## 2023-02-16 PROCEDURE — 0240U: CPT

## 2023-02-16 PROCEDURE — 85025 COMPLETE CBC W/AUTO DIFF WBC: CPT

## 2023-02-16 PROCEDURE — 96375 TX/PRO/DX INJ NEW DRUG ADDON: CPT

## 2023-02-16 NOTE — XMS REPORT
Continuity of Care Document

                          Created on:2023



Patient:FLASH TAYLOR

Sex:Male

:1960

External Reference #:[id number]





Demographics







                          Address                   [address] [place] [address]

 

                          Home Phone                [phone]

 

                          Work Phone                [phone]

 

                          Mobile Phone              [phone]

 

                          Email Address             [email or web address]

M

 

                          Preferred Language        English

 

                          Marital Status            Unknown

 

                          Spiritism Affiliation     Unknown

 

                          Race                      Unknown

 

                          Additional Race(s)        Unavailable



                                                    White

 

                          Ethnic Group              Unknown









Author







                          Organization              [facility]

t

 

                          Address                   [address]



                                                    [place] [address]

 

                          Phone                     [phone]









Support







                Name            Relationship    Address         Phone

 

                Contact, No     Other           Unavailable     [phone]

 

                Rebeca Taylor  Spouse          Unavailable     [phone]









Care Team Providers







                    Name                Role                Phone

 

                    Pcp, Patient Does Not Have A Primary Care Physician [phone]



 

                    Thais Wiggins RN Attending Clinician Unavailable

 

                    Speedy               Attending Clinician Unavailable

 

                    Irene Reyez MD Attending Clinician [phone]

 

                    Jennifer Grace MD       Attending Clinician [phone]

 

                    Julián Faria CRNA       Attending Clinician [phone]

 

                    MARJ     Attending Clinician Unavailable

 

                    JAKOB EMERSON        Attending Clinician Unavailable

 

                    Madina Shen RN  Attending Clinician Unavailable

 

                    Lab, Adc Fam Pob I  Attending Clinician Unavailable

 

                    Antoinette Fung  Attending Clinician [phone]

 

                    ANTOINETTE HAYES      Attending Clinician Unavailable

 

                    Speedy               Admitting Clinician Unavailable

 

                    IRENE REYEZ      Admitting Clinician Unavailable

 

                    MARJ     Admitting Clinician Unavailable









Payers







           Payer Name Policy Type Policy Number Effective Date Expiration Date S

parish

 

           [place]            [id number] 2022 00:00:00       

     



           [place]                                               







Problems

This patient has no known problems.



Allergies, Adverse Reactions, Alerts







       Allergy Allergy Status Severity Reaction(s) Onset  Inactive Treating Comm

ents 

Source



       Name   Type                        Date   Date   Clinician        

 

       NO KNOWN Drug   Active                                           [facility]



       ALLERGIE Class                                                   ity of



       S                                                              [facility]







Family History







           Family Member Diagnosis  Comments   Start Date Stop Date  Source

 

           Natural mother Heart disease                                  [facility]

 

           [facility] Hypertension                                  [facility]

 

           [facility] father Heart disease                                  [facility]

 

           Natural father Hypertension                                  [facility]







Social History







           Social Habit Start Date Stop Date  Quantity   Comments   Source

 

           History SDOH                                             [facility]



           Alcohol Std Drinks                                             Hospit

al

 

           History SDOH                                             [facility]



           Alcohol Binge                                             [facility]ospital

 

           Exposure to                       Yes                   [facility]



           SARS-CoV-2 (event)                                             [facility]

 

           Alcohol intake 2022 Lifetime              [facility]



                      00:00:00   00:00:00   non-drinker            Hospital



                                            (finding)             

 

           Cigarettes smoked 2022                       [facility]

st



           current (pack per 00:00:00   00:00:00                         Hospita

l



           day) - Reported                                             

 

           Cigarette  2022                       [facility]



           pack-years 00:00:00   00:00:00                         Hospital

 

           Tobacco use and 2022 Smokeless             [facility]



           exposure   00:00:00   00:00:00   tobacco non-user            Hospital

 

           History SDOH 2021 1                     [facility]



           Alcohol Frequency 00:00:00   00:00:00                         Hospita

l

 

           History of tobacco 1988 Current smoker            [facility]

[facility]



           use        00:00:00   00:00:00                         Hospital

 

           Sex Assigned At 1960 M                     [facility]



           Birth      00:00:00   00:00:00                         Hospital









                Smoking Status  Start Date      Stop Date       Source

 

                Tob[facility] smoking                                 [facility]



                consumption unknown                                 [facility]

[facility]

 

                Ex-smoker       2022 00:00:00 2022      [facility]

[facility]



                                                00:00:00        







Medications







       Ordered Filled Start  Stop   Current Ordering Indication Dosage Frequency

 Signature

                    Comments            Components          Source



     Medication Medication Date Date Medication? Clinician                (SIG) 

          



     Name Name                                                   

 

     tiotropium      2022- No             18ug      Place 18           Me

thodi



     (SPIRIVA)                                mcg into           st



     18 mcg per      14:50: 00:00                          inhaler           Hos

anju



     inhalation      38   :00                           and            l



     capsule                                         inhale.           

 

     ciprofloxac      2022- No             500mg Q.5D Take 1           Me

thodi



     in (CIPRO)                                tablet           st



     500 MG      00:00: 00:00                          (500 mg           Hospita



     tablet      00   :00                           total) by           l



                                                  mouth 2           



                                                  (two)           



                                                  times a           



                                                  day.           

 

     cholecalcif            Yes            2000U QD   Take 1           Met

hodi



     mariah,      1-04                               capsule           st



     vitamin D3,      00:00:                               (2,000           Hosp

tina



     50 mcg      00                                 Units           l



     (2,000                                         total) by           



     unit)                                         mouth           



     capsule                                         daily.           



     capsule                                                        

 

     ascorbic      2023- No             1000mg QD   Take 1           Meth

que



     acid,      04 01-05                          tablet           st



     vitamin C,      00:00: 05:59                          (1,000 mg           H

ospita



     (VITAMIN C)      00   :00                           total) by           l



     1000 MG                                         mouth           



     tablet                                         daily.           

 

     calcium       No             2{tbl} Q.5D Take 2           [facility]

di



     carbonate-v       07-31                          tablets by           s

t



     itamin D3      00:00: 04:59                          mouth 2           Hosp

tina



     500 mg-200      00   :00                           (two)           l



     unit per                                         times a           



     tablet                                         day with           



                                                  meals.           

 

     methocarbam       No             500mg      Take 500           

[facility]



     oL        6-18 09-01                          mg by           st



     (ROBAXIN)      00:00: 00:00                          mouth.           Hospi

ta



     500 MG      00   :00                                          l



     tablet                                                        

 

     theophyllin            Yes            400mg      Take 400           M

[facility]



     e 400 mg 24      4-30                               mg by           st



     hr tablet      00:00:                               mouth.           Hospit

a



               00                                                l

 

     fluticasone            Yes                      INHALE 1           Me

thodi



     -umeclidin-      3-16                               PUFF ONCE           st



     vilanter      00:00:                               DAILY FOR           Hosp

tina



     (Trelegy      00                                 30 DAYS           l



     Ellipta)                                                        



     100-62.5-25                                                        



     mcg blister                                                        



     with device                                                        



     powder for                                                        



     inhalation                                                        

 

     predniSONE            Yes                      1 (one)           Meth

que



     (DELTASONE)      6-17                               time each           st



     10 mg      00:00:                               day            Hospita



     tablet      00                                                l

 

     albuterol            Yes            2{puff} Q4H  2 puffs           Me

thodi



     (PROAIR      6-13                               every 4           st



     HFA) 90      00:00:                               (four)           Hospita



     mcg/actuati      00                                 hours as           l



     on inhaler                                         needed.           

 

     ipratropium      0      Yes                 Q.25D Inhale 4           M

ethodi



     (ATROVENT)      6-12                               (four)           st



     0.02 %      00:00:                               times a           Hospita



     nebulizer      00                                 day.           l



     solution                                                        

 

     acetylcyste acetylcyste           No                       acetylcyst      

     [facility]



     ine 200 ine 200                                    eine 200           da



     mg/mL (20 mg/mL (20                                    mg/mL (20           

Medical



     %) solution %) solution                                    %)             G

roup



     USE 1 VIAL USE 1 VIAL                                    solution          

 



     BY   BY                                      USE 1 VIAL           



     NEBULIZER NEBULIZER                                    BY             



     EVERY 6 EVERY 6                                    NEBULIZER           



     HOURS AS HOURS AS                                    EVERY 6           



     NEEDED FOR NEEDED FOR                                    HOURS AS          

 



     7 DAYS 7 DAYS                                    NEEDED FOR           



                                                  7 DAYS           

 

     acetylcyste acetylcyste           No                       acetylcyst      

     [facility]



     ine powder ine powder                                    eine           da



                                                  powder           Medical



                                                                 Group

 

     albuterol albuterol           No                       albuterol           

[facility]



     sulfate HFA sulfate HFA                                    sulfate         

  da



     90   90                                      HFA 90           Medical



     mcg/actuati mcg/actuati                                    mcg/actuat      

     Group



     on aerosol on aerosol                                    ion            



     inhaler inhaler                                    aerosol           



     INHALE 2 INHALE 2                                    inhaler           



     PUFFS BY PUFFS BY                                    INHALE 2           



     MOUTH 4 MOUTH 4                                    PUFFS BY           



     TIMES DAILY TIMES DAILY                                    MOUTH 4         

  



     AS NEEDED AS NEEDED                                    TIMES           



                                                  DAILY AS           



                                                  NEEDED           

 

     ergocalcife ergocalcife           No                       ergocalcif      

     [facility]



     rol  rol                                     mariah           da



     (vitamin (vitamin                                    (vitamin           Med

ical



     D2) 1,250 D2) 1,250                                    D2) 1,250           

Group



     mcg (50,000 mcg (50,000                                    mcg            



     unit) unit)                                    (50,000           



     capsule capsule                                    unit)           



                                                  capsule           

 

     ipratropium ipratropium           No                       ipratropiu      

     [facility]



     0.5  0.5                                     m 0.5           da



     mg-albutero mg-albutero                                    mg-albuter      

     Medical



     l 3 mg (2.5 l 3 mg (2.5                                    ol 3 mg         

  Group



     mg base)/3 mg base)/3                                    (2.5 mg           



     mL   mL                                      base)/3 mL           



     nebulizatio nebulizatio                                    nebulizati      

     



     n soln USE n soln USE                                    on soln           



     1 VIAL IN 1 VIAL IN                                    USE 1 VIAL          

 



     NEBULIZER 4 NEBULIZER 4                                    IN             



     TIMES DAILY TIMES DAILY                                    NEBULIZER       

    



     AS NEEDED AS NEEDED                                    4 TIMES           



                                                  DAILY AS           



                                                  NEEDED           

 

     theophyllin theophyllin           No                       theophylli      

     [facility]



     e  mg e  mg                                    ne        

    da



     tablet,exte tablet,exte                                    mg             M

edical



     nded nded                                    tablet,ext           Group



     release,12 release,12                                    ended           



     hr TAKE 1 hr TAKE 1                                    release,12          

 



     TABLET BY TABLET BY                                    hr TAKE 1           



     MOUTH ONCE MOUTH ONCE                                    TABLET BY         

  



     DAILY DAILY                                    MOUTH ONCE           



                                                  DAILY           

 

     Trelegy Trelegy           No                       Trelegy           [facility]

r



     Ellipta 100 Ellipta 100                                    Ellipta         

  da



     mcg-62.5 mcg-62.5                                    100            Medical



     mcg-25 mcg mcg-25 mcg                                    mcg-62.5          

 Group



     powder for powder for                                    mcg-25 mcg        

   



     inhalation inhalation                                    powder for        

   



     INHALE 1 INHALE 1                                    inhalation           



     PUFF BY PUFF BY                                    INHALE 1           



     MOUTH ONCE MOUTH ONCE                                    PUFF BY           



     DAILY DAILY                                    MOUTH ONCE           



                                                  DAILY           

 

     zinc zinc           No                       zinc           [facility]



     sulfate 50 sulfate 50                                    sulfate 50        

   da



     mg zinc mg zinc                                    mg zinc           Medica

l



     (220 mg) (220 mg)                                    (220 mg)           [facility]

up



     capsule capsule                                    capsule           



     TAKE 1 TAKE 1                                    TAKE 1           



     CAPSULE BY CAPSULE BY                                    CAPSULE BY        

   



     MOUTH ONCE MOUTH ONCE                                    MOUTH ONCE        

   



     DAILY DAILY                                    DAILY           







Vital Signs







             Vital Name   Observation Time Observation Value Comments     Source

 

             BP Diastolic 2022 00:00:00 70 mm[facility]Hg]                 [facility]

[facility]



                                                                 Group

 

             Height       2022 00:00:00 71 [in_i]                 [facility]

[facility]



                                                                 Group

 

             BMI (Body Mass 2022 00:00:00 22 kg/m2                  [facility]



             Index)                                              Group

 

             BP Systolic  2022 00:00:00 121 mm[Hg]                [facility]

[facility]



                                                                 Group

 

             Body Weight  2022 00:00:00 158 [lb_av]               [facility]

[facility]



                                                                 Group

 

             BP Diastolic 2022 00:00:00 88 mm[Hg]                 [facility]

[facility]



                                                                 Group

 

             Height       2022 00:00:00 71 [in_i]                 [facility]

[facility]



                                                                 Group

 

             BMI (Body Mass 2022 00:00:00 22.1 kg/m2                [facility]



             Index)                                              Group

 

             BP Systolic  2022 00:00:00 142 mm[Hg]                [facility]

[facility]



                                                                 Group

 

             Body Weight  2022 00:00:00 158.6 [lb_av]              [facility]

da [facility]



                                                                 Group

 

             Systolic blood 2022 20:22:00 116 mm[Hg]                [facility]



             pressure                                            

 

             Diastolic blood 2022 20:22:00 59 mm[Hg]                 [facility]



             pressure                                            

 

             Heart rate   2022 20:22:00 80 /min                   [facility]

 

             Body temperature 2022 20:22:00 36.89 Rosi                 [facility]

 

             Respiratory rate 2022 20:22:00 22 /min                   [facility]

 

             Oxygen saturation in 2022 20:22:00 98 /min                   

[facility]



             Arterial blood by                                        



             Pulse oximetry                                        

 

             Body height  2022 15:01:00 180.3 cm                  [facility]

 

             Body weight  2022 15:01:00 71.4 kg                   [facility]

 

             BMI          2022 15:01:00 21.95 kg/m2               [facility]







Procedures







                Procedure       Date / Time     Performing Clinician Source



                                Performed                       

 

                ANAEROBIC CULTURE 2022 16:32:00 [facility]

 

                FUNGUS CULTURE  2022 16:32:00 [facility]

 

                AEROBIC CULTURE 2022 16:32:00 [facility]

 

                FUNGUS SMEAR    2022 16:32:00 [facility]

 

                [place] AN ELECTIVE  2022 16:06:00 Dario Schneider [facility]



                ENDOTRACHEAL AIRWAY                                 

 

                SINUS SURGERY,  2022 15:53:00 [facility]



                ENDOSCOPIC                                      

 

                [facility]



                [facility]                                         

 

                COVID-19 QUALITATIVE 2022 22:00:00 [facility]

[facility]



                RT-PCR                                          

 

                unlisted imaging order 2022 00:00:00                 [facility]



                                                                Group







Plan of Care







             Planned Activity Planned Date Details      Comments     Source

 

             Future Scheduled 2022   COVID-19 VACCINE (#1)              [facility]



             Test         21:07:58     [code = COVID-19              



                                       VACCINE (#1)]              

 

             Future Scheduled 2022   Pneumococcal Vaccine:              [facility]



             Test         21:07:58     Pediatrics (0 to 5              



                                       Years) and At-Risk              



                                       Patients (6 to 64              



                                       Years) (1 - PCV) [code              



                                       = Pneumococcal              



                                       Vaccine: Pediatrics (0              



                                       to 5 Years) and              



                                       At-Risk Patients (6 to              



                                       64 Years) (1 - PCV)]              

 

             Future Scheduled 2022   Hepatitis C screening              [facility]



             Test         21:07:58     (procedure) [code =              



                                       209915568]                

 

             Future Scheduled 2022   COLONOSCOPY SCREENING              [facility]



             Test         21:07:58     [code = COLONOSCOPY              



                                       SCREENING]                

 

             Future Scheduled 2022   SHINGLES VACCINES (1              Met

[facility]



             Test         21:07:58     of 2) [code = SHINGLES              



                                       VACCINES (1 of 2)]              

 

             Future Scheduled 2022   INFLUENZA VACCINE              Method

[facility]



             Test         21:07:58     [code = INFLUENZA              



                                       VACCINE]                  







Encounters







        Start   End     Encounter Admission Attending Care    Care    Encounter 

Source



        Date/Time Date/Time Type    Type    Clinicians Facility Department ID   

   

 

        2023-02-15 2023-02-15 Nurse           DEB Wiggins    1.2.840.114 [id number]

[address] [facility]



        00:00:00 00:00:00 Triage          Thais DIXON   350.1.13.10        

 ity [facility] 4.2.7.2.686         Dylan

as



                                                        [phone]         Medi

hussain



                                                        019             Branch

 

        2023 Outpatient         Yan_W   MMG     MM     [id number]

[address] [place]



        00:00:00 00:00:00                                         0101    da



                                                                        Medical



                                                                        Group

 

        2022-10-14 2022-10-14 Outpatient         Yan_W   MMG     MMG     [id number]

[address] [place]



        00:00:00 00:00:00                                         1014    da



                                                                        Medical



                                                                        Group

 

        2022 Outpatient         Yan_W   MMG     MMG     [id number]

[address] [place]



        00:00:00 00:00:00                                         0911    da



                                                                        Medical



                                                                        Group

 

        2022 [facility] 1.2.840.1 [id number]

82945 Methodi



        08:50:00 23:59:00 Encounter         Irene Wiggins 28952.1.1         [address]   

  st



                                                3.430.2.7                 Hospit

a



                                                .3.585598                 l



                                                .8                      

 

        2022 Surgery         [place], 1.2.840.1 [id number]

1429 Methodi



        11:00:00 15:05:00                 Irene Wiggins 65104.1.1         217     

st



                                                3.430.2.7                 Hospit

a



                                                .3.195271                 l



                                                .8                      

 

        2022 Anesthesia         Jennifer Grace 1.2.840.1 [id number] 

[id number] 

Methodi



        10:53:00 13:57:00 Event           Julián Faria 20108.1.1         013     st



                                                3.430.2.7                 Hospit

a



                                                .3.921924                 l



                                                .8                      

 

        2022 Travel                  1.2.840.1 1.2.397.438 [id number]

[id number] Methodi



        00:00:00 00:00:00                         51013.1.1 350.1.13.43 801     

st



                                                3.430.2.7 0.2.7.3.698         Ho

spita



                                                .3.729611 084.8           l



                                                .8                      

 

        2022 Outpatient         [facility]     021     [id number]

[address] [place]



        00:00:00 00:00:00                 IRENE                  373     Method

i



                                                                        st

 

        2022 Pre-Admiss         [place], 1.2.840.1 [id number]

[id number] Methodi



        14:00:00 15:00:00 ion             Irene Wiggins 71407.1.1         825     

st



                        Testing                 3.430.2.7                 Hospit

a



                                                .3.358796                 l



                                                .8                      

 

        2022 Travel                  1.2.840.1 1.2.027.932 5642

068003 Methodi



        00:00:00 00:00:00                         42473.1.1 350.1.13.43 234     

st



                                                3.430.2.7 0.2.7.3.698         Ho

spita



                                                .3.015687 084.8           l



                                                .8                      

 

        2022 Outpatient         [facility]     [id number]

[address] [place]



        00:00:00 00:00:00                 IRENE                  825     Method

i



                                                                        st

 

        2022 Out[facility]         [facility]     [id number]

022 [place]



        00:00:00 00:00:00                                         0805    da



                                                                        Medical



                                                                        Group

 

        2022 Outpatient         TREREEN_FAR [facility]   957

 [place]



        00:00:00 00:00:00                 REYNA                    07Yoselyn    [facility]



                                                                        Program

 

        2022 Outpatient         [facility]     [id number]

022 [place]



        05:23:00 05:23:00                                         0702    da



                                                                        Medical



                                                                        Group

 

        2022 Outpatient         Yan_W   MMG     MMG     [id number]

022 [place]



        04:47:00 04:47:00                                         0616    



                                                                        Medical



                                                                        Group

 

        2022 KATHRYN Concepcion     [place] -    [id number]

6 [place]



        00:00:00 00:00:00 MD: Ramirez                         [facility],                         Network         Group



                        [address],                         [facility],                         Otolaryngol         



                        [facility]         



                        [address]                                         



                        , Ph.                                           



                        ([phone])                                           



                        [phone]                                         

 

        2022-06-10 2022-06-10 Outpatient         Yan_W   MMG     MMG     [id number]

022 [place]



        04:57:00 04:57:00                                         0610    



                                                                        Medical



                                                                        Group

 

        2022 Outpatient         Yan_W   MMG     MMG     [id number]

022 [place]



        04:54:00 04:54:00                                         0602    



                                                                        Medical



                                                                        Group

 

        2022 Outpatient         Yan_W   MMG     MMG     [id number]

022 [place]



        04:54:00 04:54:00                                         0601    



                                                                        Medical



                                                                        Group

 

        2022 KATHRYN Concepcion     [place] -    [id number]

1 [place]



        00:00:00 00:00:00 MD: Ramirez                         [facility],                         [facility]         Group



                        [address],                         [facility],                         Otolaryngol         



                        [facility]         



                        [address]                                         



                        , Ph.                                           



                        ([phone])                                           



                        [phone]                                         

 

        2022 Outpatient         Yan_W   MMG     MMG     [id number]

022 [place]



        03:05:00 03:05:00                                         0526    



                                                                        Medical



                                                                        Group

 

        2021 Outpatient         SUN,    [facility]     [id number]

[address] [place]



        00:00:00 00:00:00                 JAKOB                 779     Method

i



                                                                        st

 

        2021 Outpatient         SUN,    [facility]     [id number]

[address] [place]



        00:00:00 00:00:00                 JAKOB                 861     Method

i



                                                                        st

 

        2021 Outpatient         SUN,    [facility]     [id number]

[address] [place]



        00:00:00 00:00:00                 JAKOB                 103     Method

i



                                                                        st

 

        2021 Outpatient         Cooter    [facility]     [id number]

[address] [place]



        00:00:00 00:00:00                 JAKOB                 563     Method

i



                                                                        st

 

        2020 Darvin          DEB Shen    1.2.840.114 [id number]

52 



        00:00:00 00:00:00 (Out)           Madina DIXON   350.1.13.10         



                                                [facility] 4.2.7.2.686         



                                                        [phone]         



                                                        019             

 

        2020 Darvin          DEB Shen    1.2.840.114 [id number]

52 [facility]



        00:00:00 00:00:00 (Out)           Madina DIXON   350.1.13.10         it

y of



                                                [facility] 4.2.7.2.686         Dylan

as



                                                        [phone]         [address]

 

        2020 Laboratory         Lab, [facility] Fam Pob I [facility] [[email or web address]]    1.2.

840.114 [id number] 

[facility]



        13:13:24 13:34:21 Only            Ninfa Antoinette Samuel  350.1.13.10    

     ity of



                                                [place] 4.2.7.2.686         Dylan

as



                                                [employer] [phone]         Me

dical



                                                [address]



                                                Office                  



                                                Building                 



                                                One                     

 

        2020 Laboratory         Lab, [facility]    1.2.840.114 [id number]

[id number] 



        13:13:24 13:34:21 Only            Fam Pob I Health  350.1.13.10         



                                                [place] 4.2.7.2.686         



                                                [employer] [phone]         



                                                [place] [address]             



                                                Office                  



                                                Building                 



                                                One                     

 

        2020 Outpatient R       NINFA  [facility]    [id number]

757 [facility]



        13:00:00 13:00:00                 ANTOINETTE                         ity [facility]







Results







           Test Description Test Time  Test Comments Results    Result Comments 

Source









                    Fungus culture      2022-10-05 00:15:00 









                      Test Item  Value      Reference Range Interpretation Comme

nts









             Fungus culture isolate No growth after 4 weeks of                  

         Specimen InformationSpecimen



             (test code = 580-1) incubation.                            Source: 

FluidSpecimen Site:



                                                                 Sinus: Right Et

hmoid Sinus



[facility] [id number] 13:04:00





             Test Item    Value        Reference Range Interpretation Comments

 

             Anaerobic    No anaerobic                           Specimen



             culture isolate organisms                              InformationS

pecimen



             (test code = isolated.                              Source: [facility]

ecimen



             38136-2)                                            Site: Sinus: Ri

t



                                                                 Ethmoid Sinus



[facility] [id number] 19:19:20





             Test Item    Value        Reference Range Interpretation Comments

 

             Case number (test code = [id number]                           



             [id number])                                            

 

             Surgical pathology See link below for                           



             report (test code = PDF Lab Report                           



             5377)                                               

 

             Result status (test code This is Final Report                      

     



             = [id number])   for [id number]                           



[id number] 01:46:00





             Test Item    Value        Reference    Interpretation Comments



                                       Range                     

 

             Aerobic culture Normal                    A            Specimen



             isolate (test code respiratory                            Informati

onSpecimen



             = 43935-8)   floraFew                               Source: [facility]

[facility]



                                                                 Site: Sinus: Ri

ght



                                                                 Ethmoid Sinus

 

             Lab Interpretation Abnormal                               



             (test code =                                        



             97622-8)                                            



[id number] 14:52:00





             Test Item    Value        Reference Range Interpretation Comments

 

             Fungus smear No fungi                               Specimen



             (test code = observed.                              InformationSpec

imen Source:



             1443)                                               FluidSpecimen S

ite: Sinus:



                                                                 Right Ethmoid S

inus



[facility] [id number] 04:08:00





             Test Item    Value        Reference Range Interpretation Comments

 

             Gram stain   No WBC's or                            Specimen



             isolate (test organisms seen.                           Information

Specimen



             code = 1469)                                        Source: [facility]

ecimen



                                                                 Site: Sinus: Ri

t



                                                                 Ethmoid Sinus



[facility] [Presence] in Respiratory specimen 
by JUANI with probe [id number] 21:22:50





             Test Item    Value        Reference Range Interpretation Comments

 

             SARS-CoV-2 (COVID-19) RNA Not detected                           



             [Presence] in Respiratory                                        



             specimen by JUANI with probe                                        



             detection (test code = 54024-1)                                    

    

 

             Whether patient is employed in a Unknown                           

     



             healthcare setting (test code =                                    

    



             04128-7)                                            

 

             Whether the patient has symptoms Unknown                           

     



             related to condition of interest                                   

     



             (test code = 35114-5)                                        

 

             Whether the patient was Unknown                                



             hospitalized for condition of                                      

  



             interest (test code = 65733-3)                                     

   

 

             Whether the patient was admitted Unknown                           

     



             to intensive care unit (ICU) for                                   

     



             condition of interest (test code                                   

     



             = 52644-4)                                          

 

             Whether patient resides in a Unknown                               

 



             congregate care setting (test                                      

  



             code = 84220-3)                                        

 

             Pregnancy status (test code = Unknown                              

  



             96560-4)                                            

 

             Date and time of symptom onset Unknown                             

   



             (test code = 09519-2)                                        



RAMIREZ [facility]

## 2023-02-16 NOTE — EDPHYS
Physician Documentation                                                                           

 [facility]                                                                 

Name: Jaxon Taylor                                                                             

Age: 62 yrs                                                                                       

Sex: Male                                                                                         

: 1960                                                                                   

MRN: [id number]                                                                                   

Arrival Date: 2023                                                                          

Time: 08:22                                                                                       

Account#: [id number]                                                                            

Bed 4                                                                                             

Private MD:                                                                                       

ED Physician Tabitha Kumar                                                                         

HPI:                                                                                              

                                                                                             

08:40 This 62 yrs old Male presents to ER via Wheelchair with complaints of Breathing         pm1 

      Difficulty, Urinary Problem.                                                                

08:40 The patient has shortness of breath at rest, and the patient has a history of COPD.     pm1 

      Onset: The symptoms/episode began/occurred 4 day(s) ago. Duration: The symptoms are         

      continuous, and are steadily getting worse. The patient's shortness of breath is            

      aggravated by nothing, is alleviated by increasing his supplemental oxygen from 2L to       

      6L. Patient's baseline 2L via NC. Patient has used albuterol treatments at home to help     

      but increased shortness of breath still continues from baseline. Albuterol times 2 at       

      0700 this AM. Associated signs and symptoms: Pertinent positives: productive cough,         

      fever, Pertinent negatives: chest pain. Severity of symptoms: in the emergency              

      department the symptoms are worse. The patient has experienced similar episodes in the      

      past, chronically. The patient has not recently seen a physician, the patient's primary     

      care provider is Dr. Santos. Pulmonologist Dilcia. Patient reports history of               

      difficulty urinating for many months. Patient was able to urinate this AM.                  

08:40 Patient reports pulse ox at home 79% yesterday and tachycardic. Patient resolved by     pm1 

      lying still and increasing his oxygen from 2L to 6L at that time.                           

                                                                                                  

Historical:                                                                                       

- Allergies:                                                                                      

08:29 No Known Allergies;                                                                     jl7 

- Home Meds:                                                                                      

08:29 Albuterol Inhl [Active]; ProAir HFA inhalation [Active]; Symbicort inhalation [Active]; jl7 

- PMHx:                                                                                           

08:29 COPD; Emphysema;                                                                        jl7 

                                                                                                  

- Immunization history:: Client reports receiving the 2nd dose of the Covid vaccine.              

- Social history:: Smoking status: Patient reports the use of cigarette tobacco                   

  products, unknown amount.                                                                       

                                                                                                  

                                                                                                  

ROS:                                                                                              

08:40 ENT: Negative for injury, pain, and discharge, Cardiovascular: Negative for chest pain, pm1 

      palpitations, and edema.                                                                    

08:40 Abdomen/GI: Negative for abdominal pain, nausea, vomiting, diarrhea, and constipation,      

      Back: Negative for injury and pain.                                                         

08:40 MS/Extremity: Negative for injury and deformity, Skin: Negative for injury, rash, and       

      discoloration, Neuro: Negative for headache, weakness, numbness, tingling, and seizure.     

08:40 Constitutional: Positive for fever, 2 days ago 100.9 that resolved.                         

08:40 Respiratory: Positive for cough, with green sputum, shortness of breath, at rest.           

      wheezing, expiratory.                                                                       

08:40 : Positive for difficulty urinating, urinated this AM prior to arrival.                   

08:40 All other systems are negative.                                                             

                                                                                                  

Exam:                                                                                             

08:40 Constitutional:  This is a well developed, well nourished patient who is awake, alert,  pm1 

      and in no acute distress. Head/Face:  Normocephalic, atraumatic.                            

08:40 Back:  No spinal tenderness.  No costovertebral tenderness.  Full range of motion.          

      Skin:  Warm, dry with normal turgor.  Normal color with no rashes, no lesions, and no       

      evidence of cellulitis. MS/ Extremity:  Pulses equal, no cyanosis.  Neurovascular           

      intact.  Full, normal range of motion.                                                      

08:40 Cardiovascular: Exam negative for  acute changes, Rate: normal, actual rate is  99 bpm,     

      Rhythm: regular, Pulses: no pulse deficits are appreciated, Heart sounds: normal,           

      normal S1and S2, Edema: is not appreciated.                                                 

08:40 Respiratory: mild respiratory distress is noted,  Respirations: tachypnea, 25 Breath        

      sounds: wheezing: expiratory is heard diffusely.                                            

08:40 Abdomen/GI: Inspection: abdomen appears normal, Palpation: abdomen is soft and              

      non-tender, in all quadrants.                                                               

08:40 Neuro: Exam negative for acute changes, Orientation: is normal, Mentation: is normal,       

      Motor: is normal, moves all fours.                                                          

                                                                                                  

Vital Signs:                                                                                      

08:26  / 76; Pulse 99; Resp 25; Pulse Ox 94% on 6 lpm NC;                               jl7 

09:08  / 75; Pulse 88; Resp 16; Pulse Ox 99% on 8% Nebulizer Mask; Pain 0/10;           ld1 

10:18  / 67; Pulse 97; Resp 18; Pulse Ox 96% on 4 lpm NC;                               ld1 

10:30  / 90; Pulse 88; Resp 18; Pulse Ox 98% on 4 lpm NC;                               ld1 

12:31  / 85; Pulse 92; Resp 18; Pulse Ox 95% on 3 lpm NC;                               ld1 

                                                                                                  

MDM:                                                                                              

08:26 Patient medically screened.                                                             pm1 

08:35 Data interpreted: Pulse oximetry: on 6L(s) per nasal canula, is 94 %.                   pm1 

08:44 Test considered but Not performed: CT: Patient without abdominal tenderness or bladder  pm1 

      distension on examination. Bladder scan volume 46 mL. Patient with chronic voiding          

      issue likely BPH and can be followed up on outpatient basis.                                

09:03 Differential diagnosis: CHF exacerbation, Chronic Obstructive Pulmonary Disease         pm1 

      Myocardial Infarction pneumonia, pulmonary edema, Pulmonary Embolism Unstable Angina.       

09:03 Data reviewed: vital signs.                                                             pm1 

10:01 ED course: Patient reports improvement in breathing and his NC has been weaned down to  pm1 

      4L.                                                                                         

10:05 Consideration of Admission/Observation Escalation of care including                     pm1 

      admission/observation considered.                                                           

10:05 Management of patient was discussed with the following: Hospitalist: Discussed with Dr. jorje Mccann regarding patient consideration for admission. Patient's labs and chest x-ray         

      within normal limits for history COPD. Patient has been weaned from 6 L nasal cannula       

      down to 4L. Patient would have been discharged home from observation admission if he        

      was admitted once he was lowered to 4L. Patient has oxygen at home, breathing               

      treatments. Will discharge patient home with prednisone 20 mg twice daily for 4 days        

      and follow-up with his pulmonologist.                                                       

11:05 Counseling: I had a detailed discussion with the patient and/or guardian regarding: the pm1 

      historical points, exam findings, and any diagnostic results supporting the                 

      discharge/admit diagnosis, lab results, radiology results, the need for outpatient          

      follow up, a pulmonologist, to return to the emergency department if symptoms worsen or     

      persist or if there are any questions or concerns that arise at home.                       

                                                                                                  

                                                                                             

08:34 Order name: Basic Metabolic Panel; Complete Time: 09:48                                 pm1 

                                                                                             

08:34 Order name: CBC with Diff; Complete Time: 09:18                                         pm1 

                                                                                             

08:34 Order name: LFT's; Complete Time: 09:48                                                 pm1 

                                                                                             

08:34 Order name: Magnesium; Complete Time: 09:48                                             pm1 

                                                                                             

08:34 Order name: NT PRO-BNP; Complete Time: 09:48                                            pm1 

                                                                                             

08:34 Order name: PT-INR; Complete Time: 09:18                                                pm1 

                                                                                             

08:34 Order name: Troponin HS; Complete Time: 09:48                                           pm1 

                                                                                             

08:34 Order name: XRAY Chest (1 view); Complete Time: 09:48                                   pm1 

                                                                                             

08:34 Order name: EKG; Complete Time: 08:35                                                   pm1 

                                                                                             

08:34 Order name: COVID-19/FLU A+B; Complete Time: 09:48                                      pm1 

                                                                                             

08:34 Order name: Cardiac monitoring; Complete Time: 08:49                                    pm1 

                                                                                             

08:34 Order name: EKG - Nurse/Tech; Complete Time: 08:49                                      pm1 

                                                                                             

08:34 Order name: IV Saline Lock; Complete Time: 08:51                                        pm1 

                                                                                             

08:34 Order name: Labs collected and sent; Complete Time: 08:51                               pm1 

                                                                                             

08:34 Order name: O2 Per Protocol; Complete Time: 08:37                                       pm1 

                                                                                             

08:34 Order name: O2 Sat Monitoring; Complete Time: 08:37                                     pm1 

                                                                                             

08:34 Order name: Bladder Scanner; Complete Time: 08:49                                       pm1 

                                                                                                  

EC:49 Rate is 90 beats/min. Rhythm is regular, Normal sinus rhythm with sinus arrhythmia with pm1 

      Right bundle branch block. QRS Axis is Normal. OK interval is normal. QRS interval is       

      normal. QT interval is normal. No Q waves. T waves are Normal. No ST changes noted.         

      Clinical impression: Normal sinus rhythm with sinus arrhythmia, right bundle branch         

      block.                                                                                      

                                                                                                  

Administered Medications:                                                                         

09:07 Drug: Xopenex (levalbuterol) (3) 1.25 mg Route: Inhalation;                             ld1 

10:28 Follow up: Response: No adverse reaction                                                ld1 

09:07 Drug: SOLU-Medrol (methylPrednisoLONE) 125 mg Route: IVP; Site: right forearm;          ld1 

10:28 Follow up: Response: No adverse reaction                                                ld1 

10:30 Drug: Rocephin (cefTRIAXone) 1 grams Route: IV; Rate: calculated rate; Site: right      ld1 

      forearm;                                                                                    

10:44 Follow up: Response: Nausea is increased; Vomiting increased; IV Status: Completed      ld1 

      infusion                                                                                    

10:45 Not Given (Patient Refused): Zofran (Ondansetron) 4 mg IVP once; over 2 minutes         ld1 

                                                                                                  

                                                                                                  

Disposition Summary:                                                                              

23 12:45                                                                                    

Discharge Ordered                                                                                 

      Location: Home                                                                          pm1 

      Problem: new                                                                            pm1 

      Symptoms: have improved                                                                 pm1 

      Condition: Stable                                                                       pm1 

      Diagnosis                                                                                   

        - COPD/ Chronic obstructive pulmonary disease with (acute) exacerbation               pm1 

      Followup:                                                                               pm1 

        - With:                                                                                    

        - When: 2 - 3 days                                                                         

        - Reason: Recheck today's complaints, Continuance of care, Re-evaluation by your           

      physician                                                                                   

      Discharge Instructions:                                                                     

        - Discharge Summary Sheet                                                             pm1 

        - Chronic Obstructive Pulmonary Disease Exacerbation                                  pm1 

      Forms:                                                                                      

        - Medication Reconciliation Form                                                      pm1 

        - Thank You Letter                                                                    pm1 

        - Antibiotic Education                                                                pm1 

        - Prescription Opioid Use                                                             pm1 

      Prescriptions:                                                                              

        - Prednisone 20 mg Oral Tablet                                                             

            - take 2 tablet by ORAL route once daily for 4 days; 8 tablet; Refills: 0,        pm1 

      Product Selection Permitted                                                                 

        - Albuterol Sulfate 2.5 mg /3 mL (0.083 %) Inhalation Solution for Nebulization            

            - inhale 1 unit by NEBULIZATION route every 6 hours As needed; 1 box; Refills: 0, pm1 

      Product Selection Permitted                                                                 

        - Zithromax Z-Shaka 250 mg Oral Tablet                                                       

            - take 1 tablet by ORAL route as directed for 5 days Day 1 - take two (2) tablets pm1 

      one time.  Day 2, 3, 4 , 5 take one (1) tablet once daily.; 6 tablet; Refills: 0,           

      Product Selection Permitted                                                                 

        - Guaifenesin AC  mg/5 mL Oral Liquid                                                

            - take 10 milliliters by ORAL route every 4 hours As needed; 240 milliliter;      pm1 

      Refills: 0, Product Selection Permitted                                                     

Signatures:                                                                                       

Dispatcher [employer]                           Alvarez Ramos NP                    NP   pm1                                                  

Lenore Samuels RN                        RN   jl7                                                  

Francesca Escalante RN                     RN   ld1                                                  

                                                                                                  

**************************************************************************************************

## 2023-02-16 NOTE — RAD REPORT
EXAM DESCRIPTION:  RAD - Chest Single View - 2/16/2023 9:36 am

 

CLINICAL HISTORY:  SOB

Chest pain.

 

COMPARISON:  Chest Single View dated 11/18/2022; Chest Single View dated 6/6/2021; Chest Single View 
dated 1/3/2021; Chest Single View dated 10/1/2020

 

FINDINGS:  Portable technique limits examination quality.

 

Prominent diffuse emphysema is noted. No focal infiltrate detected. The heart is normal in size. No d
isplaced fractures.Aortic atherosclerosis.

 

IMPRESSION:  Moderate diffuse COPD.

## 2023-02-16 NOTE — ER
Nurse's Notes                                                                                     

 [facility]                                                                 

Name: Jaxon Taylor                                                                             

Age: 62 yrs                                                                                       

Sex: Male                                                                                         

: 1960                                                                                   

MRN: [id number]                                                                                   

Arrival Date: 2023                                                                          

Time: 08:22                                                                                       

Account#: [id number]                                                                            

Bed 4                                                                                             

Private MD:                                                                                       

Diagnosis: COPD/ Chronic obstructive pulmonary disease with (acute) exacerbation                  

                                                                                                  

Presentation:                                                                                     

                                                                                             

08:26 Chief complaint: Patient states: Shortness of breath x 4 days, difficulty voiding x a   jl7 

      few months and worse in the last week. Coronavirus screen: cough unrelated to               

      allergies, difficulty breathing, Client presents with at least one sign or symptom that     

      may indicate coronavirus-19. Ebola Screen: No symptoms or risks identified at this          

      time. Initial Sepsis Screen: Does the patient meet any 2 criteria? No. Patient's            

      initial sepsis screen is negative. Does the patient have a suspected source of              

      infection? No. Patient's initial sepsis screen is negative. Risk Assessment: Do you         

      want to hurt yourself or someone else? Patient reports no desire to harm self or others.    

08:26 Method Of Arrival: Wheelchair                                                           jl7 

08:26 Acuity: FROILAN 2                                                                           jl7 

12:57 Onset of symptoms is unknown.                                                           ld1 

                                                                                                  

Triage Assessment:                                                                                

08:29 General: Appears in no apparent distress. uncomfortable, Behavior is calm, cooperative, jl7 

      appropriate for age. Pain: Denies pain. Cardiovascular: Patient's skin is warm and dry.     

      Respiratory: Reports shortness of breath at rest cough that is productive, Airway is        

      patent Respiratory effort is even, labored, Respiratory pattern is symmetrical,             

      tachypnea Onset: The symptoms/episode began/occurred gradually, the patient has             

      moderate shortness of breath.                                                               

                                                                                                  

Historical:                                                                                       

- Allergies:                                                                                      

08:29 No Known Allergies;                                                                     jl7 

- Home Meds:                                                                                      

08:29 Albuterol Inhl [Active]; ProAir HFA inhalation [Active]; Symbicort inhalation [Active]; jl7 

- PMHx:                                                                                           

08:29 COPD; Emphysema;                                                                        jl7 

                                                                                                  

- Immunization history:: Client reports receiving the 2nd dose of the Covid vaccine.              

- Social history:: Smoking status: Patient reports the use of cigarette tobacco                   

  products, unknown amount.                                                                       

                                                                                                  

                                                                                                  

Screenin:08 [facility] ED Fall Risk Assessment (Adult) History of falling in the last 3 months,       ld1 

      including since admission No falls in past 3 months (0 pts). Abuse screen: Denies           

      threats or abuse. Denies injuries from another. Nutritional screening: No deficits          

      noted. Tuberculosis screening: No symptoms or risk factors identified.                      

                                                                                                  

Assessment:                                                                                       

09:08 General: Appears in no apparent distress. comfortable, Behavior is calm, cooperative,   ld1 

      appropriate for age. Pain: Denies pain. Neuro: Level of Consciousness is awake, alert,      

      obeys commands, Oriented to person, place, time, situation, Appropriate for age.            

      Cardiovascular: Capillary refill < 3 seconds Patient's skin is warm and dry. Rhythm is      

      sinus rhythm. Respiratory: Reports shortness of breath at rest on exertion Airway is        

      patent Respiratory effort is even, labored, Breath sounds with crackles bilaterally.        

      the patient has mild shortness of breath. GI: Abdomen is flat, non-distended. : No        

      signs and/or symptoms were reported regarding the genitourinary system. EENT: No signs      

      and/or symptoms were reported regarding the EENT system. Derm: No signs and/or symptoms     

      reported regarding the dermatologic system. Musculoskeletal: No signs and/or symptoms       

      reported regarding the musculoskeletal system.                                              

10:18 Reassessment: Patient appears in no apparent distress at this time. Patient and/or      ld1 

      family updated on plan of care and expected duration. Pain level reassessed. Patient is     

      alert, oriented x 3, equal unlabored respirations, skin warm/dry/pink.                      

10:45 Reassessment: Pt complaining of nausea after receiving Rocephin - offered pt            ld1 

      Ondansetron. Pt refused, states "I need to throw up whatever is in my stomach." Pt          

      reports "If I go home I will just feel sick again at home alone." Pt undecided on if he     

      "Wants" to stay in hospital.                                                                

12:34 Reassessment: Patient appears in no apparent distress at this time. Patient and/or      ld1 

      family updated on plan of care and expected duration. Pain level reassessed. Patient is     

      alert, oriented x 3, equal unlabored respirations, skin warm/dry/pink. Patient states       

      symptoms have improved.                                                                     

                                                                                                  

Vital Signs:                                                                                      

08:26  / 76; Pulse 99; Resp 25; Pulse Ox 94% on 6 lpm NC;                               jl7 

09:08  / 75; Pulse 88; Resp 16; Pulse Ox 99% on 8% Nebulizer Mask; Pain 0/10;           ld1 

10:18  / 67; Pulse 97; Resp 18; Pulse Ox 96% on 4 lpm NC;                               ld1 

10:30  / 90; Pulse 88; Resp 18; Pulse Ox 98% on 4 lpm NC;                               ld1 

12:31  / 85; Pulse 92; Resp 18; Pulse Ox 95% on 3 lpm NC;                               ld1 

                                                                                                  

ED Course:                                                                                        

08:22 Patient arrived in ED.                                                                  mr  

08:26 Alvarez Lennon, ELLEN is PHCP.                                                           pm1 

08:26 Tabitha Kumar MD is Attending Physician.                                                pm1 

08:29 Triage completed.                                                                       jl7 

08:29 Arm band placed on right wrist.                                                         jl7 

08:58 Francesca Escalante, JAVI is Primary Nurse.                                                   ld1 

08:58 COVID-19/FLU A+B Sent.                                                                  ld1 

09:08 Patient has correct armband on for positive identification. Placed in gown. Bed in low  ld1 

      position. Call light in reach. Side rails up X2. Cardiac monitor on. Pulse ox on. NIBP      

      on. Door closed. Noise minimized. Warm blanket given.                                       

09:08 Inserted saline lock: 20 gauge in right forearm, using aseptic technique. Blood         ld1 

      collected.                                                                                  

09:08 No provider procedures requiring assistance completed.                                  ld1 

09:38 XRAY Chest (1 view) In Process Unspecified.                                             EDMS

12:45 Casey Ha MD is Referral Physician.                                             pm1 

12:57 IV discontinued, intact, bleeding controlled, No redness/swelling at site.              ld1 

                                                                                                  

Administered Medications:                                                                         

09:07 Drug: Xopenex (levalbuterol) (3) 1.25 mg Route: Inhalation;                             ld1 

10:28 Follow up: Response: No adverse reaction                                                ld1 

09:07 Drug: SOLU-Medrol (methylPrednisoLONE) 125 mg Route: IVP; Site: right forearm;          ld1 

10:28 Follow up: Response: No adverse reaction                                                ld1 

10:30 Drug: Rocephin (cefTRIAXone) 1 grams Route: IV; Rate: calculated rate; Site: right      ld1 

      forearm;                                                                                    

10:44 Follow up: Response: Nausea is increased; Vomiting increased; IV Status: Completed      ld1 

      infusion                                                                                    

10:45 Not Given (Patient Refused): Zofran (Ondansetron) 4 mg IVP once; over 2 minutes         ld1 

                                                                                                  

                                                                                                  

Medication:                                                                                       

12:57 VIS not applicable for this client.                                                     ld1 

                                                                                                  

Outcome:                                                                                          

12:45 Discharge ordered by MD.                                                                pm1 

12:56 Discharged to home ambulatory.                                                          ld1 

12:56 Condition: stable                                                                           

12:56 Discharge instructions given to patient, Instructed on discharge instructions, follow       

      up and referral plans. medication usage, Demonstrated understanding of instructions,        

      follow-up care, medications, Prescriptions given X 4.                                       

12:57 Patient left the ED.                                                                    ld1 

                                                                                                  

Signatures:                                                                                       

Dispatcher [employer]                           LIANGMS                                                 

[place]                                 mr LennonAlvarez, NP                    NP   pm1                                                  

Lenore Samuels RN                        RN   jl7                                                  

Francesca Escalante RN                     RN   ld1                                                  

                                                                                                  

Corrections: (The following items were deleted from the chart)                                    

08:31 08:26 Acuity: FROILAN 3 jl7                                                                 [email or web address] 

                                                                                                  

**************************************************************************************************

## 2023-02-17 NOTE — EKG
Test Date:    2023-02-16               Test Time:    08:46:35

Technician:   BP                                     

                                                     

MEASUREMENT RESULTS:                                       

Intervals:                                           

Rate:         90                                     

TX:           136                                    

QRSD:         112                                    

QT:           360                                    

QTc:          440                                    

Axis:                                                

P:            72                                     

TX:           136                                    

QRS:          92                                     

T:            64                                     

                                                     

INTERPRETIVE STATEMENTS:                                       

                                                     

Normal sinus rhythm with sinus arrhythmia

Right bundle branch block

Abnormal ECG

Compared to ECG 11/18/2022 15:14:40

Sinus tachycardia no longer present

Ventricular premature complex(es) no longer present



Electronically Signed On 02-17-23 16:00:49 CST by Emmanuel Rios

## 2023-05-18 ENCOUNTER — HOSPITAL ENCOUNTER (INPATIENT)
Dept: HOSPITAL 97 - ER | Age: 63
LOS: 2 days | Discharge: HOME HEALTH | DRG: 191 | End: 2023-05-20
Attending: INTERNAL MEDICINE | Admitting: INTERNAL MEDICINE
Payer: COMMERCIAL

## 2023-05-18 VITALS — BODY MASS INDEX: 22.8 KG/M2

## 2023-05-18 DIAGNOSIS — J44.1: Primary | ICD-10-CM

## 2023-05-18 DIAGNOSIS — Z79.899: ICD-10-CM

## 2023-05-18 DIAGNOSIS — Z28.310: ICD-10-CM

## 2023-05-18 DIAGNOSIS — Z87.891: ICD-10-CM

## 2023-05-18 DIAGNOSIS — Z16.23: ICD-10-CM

## 2023-05-18 DIAGNOSIS — Z90.49: ICD-10-CM

## 2023-05-18 DIAGNOSIS — Z79.52: ICD-10-CM

## 2023-05-18 DIAGNOSIS — B96.5: ICD-10-CM

## 2023-05-18 LAB
ALBUMIN SERPL BCP-MCNC: 3.4 G/DL (ref 3.4–5)
ALP SERPL-CCNC: 68 U/L (ref 45–117)
ALT SERPL W P-5'-P-CCNC: 17 U/L (ref 16–61)
AST SERPL W P-5'-P-CCNC: 12 U/L (ref 15–37)
BUN BLD-MCNC: 16 MG/DL (ref 7–18)
COHGB MFR BLDA: 1 % (ref 0–1.5)
GLUCOSE SERPLBLD-MCNC: 90 MG/DL (ref 74–106)
HCT VFR BLD CALC: 44.2 % (ref 39.6–49)
INR BLD: 0.94
LYMPHOCYTES # SPEC AUTO: 1.5 K/UL (ref 0.7–4.9)
MAGNESIUM SERPL-MCNC: 2.1 MG/DL (ref 1.6–2.4)
MCV RBC: 87.8 FL (ref 80–100)
NT-PROBNP SERPL-MCNC: 43 PG/ML (ref ?–125)
OXYHGB MFR BLDA: 94.2 % (ref 94–97)
PMV BLD: 8.2 FL (ref 7.6–11.3)
POTASSIUM SERPL-SCNC: 4.2 MEQ/L (ref 3.5–5.1)
RBC # BLD: 5.04 M/UL (ref 4.33–5.43)
SAO2 % BLDA: 96.5 % (ref 92–98.5)

## 2023-05-18 PROCEDURE — 94640 AIRWAY INHALATION TREATMENT: CPT

## 2023-05-18 PROCEDURE — 87186 SC STD MICRODIL/AGAR DIL: CPT

## 2023-05-18 PROCEDURE — 87205 SMEAR GRAM STAIN: CPT

## 2023-05-18 PROCEDURE — 85025 COMPLETE CBC W/AUTO DIFF WBC: CPT

## 2023-05-18 PROCEDURE — 84484 ASSAY OF TROPONIN QUANT: CPT

## 2023-05-18 PROCEDURE — 36415 COLL VENOUS BLD VENIPUNCTURE: CPT

## 2023-05-18 PROCEDURE — 71045 X-RAY EXAM CHEST 1 VIEW: CPT

## 2023-05-18 PROCEDURE — 94760 N-INVAS EAR/PLS OXIMETRY 1: CPT

## 2023-05-18 PROCEDURE — 83735 ASSAY OF MAGNESIUM: CPT

## 2023-05-18 PROCEDURE — 99285 EMERGENCY DEPT VISIT HI MDM: CPT

## 2023-05-18 PROCEDURE — 85730 THROMBOPLASTIN TIME PARTIAL: CPT

## 2023-05-18 PROCEDURE — 81003 URINALYSIS AUTO W/O SCOPE: CPT

## 2023-05-18 PROCEDURE — 83880 ASSAY OF NATRIURETIC PEPTIDE: CPT

## 2023-05-18 PROCEDURE — 87077 CULTURE AEROBIC IDENTIFY: CPT

## 2023-05-18 PROCEDURE — 36569 INSJ PICC 5 YR+ W/O IMAGING: CPT

## 2023-05-18 PROCEDURE — 87040 BLOOD CULTURE FOR BACTERIA: CPT

## 2023-05-18 PROCEDURE — 80053 COMPREHEN METABOLIC PANEL: CPT

## 2023-05-18 PROCEDURE — 82805 BLOOD GASES W/O2 SATURATION: CPT

## 2023-05-18 PROCEDURE — 93005 ELECTROCARDIOGRAM TRACING: CPT

## 2023-05-18 PROCEDURE — 87070 CULTURE OTHR SPECIMN AEROBIC: CPT

## 2023-05-18 PROCEDURE — 83605 ASSAY OF LACTIC ACID: CPT

## 2023-05-18 PROCEDURE — 85610 PROTHROMBIN TIME: CPT

## 2023-05-18 PROCEDURE — 80048 BASIC METABOLIC PNL TOTAL CA: CPT

## 2023-05-18 RX ADMIN — IPRATROPIUM BROMIDE SCH MG: 0.5 SOLUTION RESPIRATORY (INHALATION) at 21:32

## 2023-05-18 RX ADMIN — Medication SCH ML: at 21:00

## 2023-05-18 RX ADMIN — ALBUTEROL SULFATE SCH MG: 2.5 SOLUTION RESPIRATORY (INHALATION) at 21:32

## 2023-05-18 NOTE — XMS REPORT
Continuity of Care Document

                             Created on:May 18, 2023



Patient:FLASH AGUILERA

Sex:Male

:1960

External Reference #:[id number]





Demographics







                          Address                   [address] [place] [address]

 

                          Home Phone                [phone]

 

                          Work Phone                [phone]

 

                          Mobile Phone              [phone]

 

                          Email Address             [email or web address]

M

 

                          Preferred Language        English

 

                          Marital Status            Unknown

 

                          Yazidism Affiliation     Unknown

 

                          Race                      Unknown

 

                          Additional Race(s)        Unavailable



                                                    White

 

                          Ethnic Group              Unknown









Author







                          Organization              [facility]

t

 

                          Address                   [address] [facility] [address] [place] [address]

 

                          Phone                     [phone]









Support







                Name            Relationship    Address         Phone

 

                Contact, No     Other           Unavailable     [phone]

 

                Rebeca Aguilera  Spouse          Unavailable     [phone]









Care Team Providers







                    Name                Role                Phone

 

                    Pcp, Patient Does Not Have A Primary Care Physician [phone]



 

                    Thais Wiggins RN Attending Clinician Unavailable

 

                    Speedy               Attending Clinician Unavailable

 

                    Irene Flores MD Attending Clinician [phone]

 

                    Jennifer Grace MD       Attending Clinician [phone]

 

                    Julián Faria CRNA       Attending Clinician [phone]

 

                    MARJ     Attending Clinician Unavailable

 

                    JAKOB EMERSON        Attending Clinician Unavailable

 

                    Madina Shen RN  Attending Clinician Unavailable

 

                    Lab, Adc Fam Pob I  Attending Clinician Unavailable

 

                    Antoinette Fung  Attending Clinician [phone]

 

                    ANTOINETTE OCASIO      Attending Clinician Unavailable

 

                    Speedy               Admitting Clinician Unavailable

 

                    IRENE FLORES      Admitting Clinician Unavailable

 

                    MARJ     Admitting Clinician Unavailable









Payers







           Payer Name Policy Type Policy Number Effective Date Expiration Date S

parish

 

           [place]            [id number] 2022 00:00:00       

     



           [place]                                               







Problems

This patient has no known problems.



Allergies, Adverse Reactions, Alerts







       Allergy Allergy Status Severity Reaction(s) Onset  Inactive Treating Comm

ents 

Source



       Name   Type                        Date   Date   Clinician        

 

       NO KNOWN Drug   Active                                           [facility]



       ALLERGIE Class                                                   ity of



       S                                                              [facility]







Family History







           Family Member Diagnosis  Comments   Start Date Stop Date  Source

 

           Natural mother Heart disease                                  [facility]

 

           Natural mother Hypertension                                  [facility]

 

           Natural father Heart disease                                  [facility]

 

           Natural father Hypertension                                  [facility]







Social History







           Social Habit Start Date Stop Date  Quantity   Comments   Source

 

           Gender identity 2022            Identifies as male            [facility]

[facility]



                      08:13:15              gender (finding)            [facility]

 

           Sexual orientation 2022            Heterosexual            [facility]

[facility]



                      08:13:15              (finding)             [facility]ospital

 

           History SDOH                                             [facility]



           Alcohol Std Drinks                                             [facility]

al

 

           History SDOH                                             [facility]



           Alcohol Binge                                             [facility]

 

           Exposure to                       Yes                   [facility]



           SARS-CoV-2 (event)                                             [facility]

 

           Alcohol intake 2022 Lifetime              [facility]



                      00:00:00   00:00:00   non-drinker            Hospital



                                            (finding)             

 

           History of Social 2022                       [facility]

[facility]



           function   00:00:00   00:00:00                         Hospital

 

           Tobacco use and 2022 Smokeless tobacco            [facility]

[facility]



           exposure   00:00:00   00:00:00   non-user              Hospital

 

           Cigarettes smoked 2022                       [facility]

st



           current (pack per 00:00:00   00:00:00                         Hospita

l



           day) - Reported                                             

 

           Cigarette  2022                       [facility]



           pack-years 00:00:00   00:00:00                         Hospital

 

           History SDOH 2021 1                     [facility]



           Alcohol Frequency 00:00:00   00:00:00                         Hospita

l

 

           History of tobacco 1988 Cigarette Smoker            

[facility]



           use        00:00:00   00:00:00                         Hospital

 

           Sex Assigned At 1960 M                     [facility]



           Birth      00:00:00   00:00:00                         Hospital









                Smoking Status  Start Date      Stop Date       Source

 

                Tobacco smoking                                 [facility]

[place]



                consumption unknown                                 [facility]

[facility]

 

                Ex-smoker       2022 00:00:00 2022      [facility]

[facility]



                                                00:00:00        







Medications







       Ordered Filled Start  Stop   Current Ordering Indication Dosage Frequency

 Signature

                    Comments            Components          Source



     Medication Medication Date Date Medication? Clinician                (SIG) 

          



     Name Name                                                   

 

     tiotropium      2022- No             18ug      Place 18           Me

thodi



     (SPIRIVA)                                mcg into           st



     18 mcg per      14:50: 00:00                          inhaler           Hos

anju



     inhalation      38   :00                           and            l



     capsule                                         inhale.           

 

     tiotropium      2022- No             18ug      Place 18           Me

thodi



     (SPIRIVA)      -                          mcg into           st



     18 mcg per      14:50: 00:00                          inhaler           Hos

anju



     inhalation      38   :00                           and            l



     capsule                                         inhale.           

 

     ciprofloxac      2022- No             500mg Q.5D Take 1           Me

thodi



     in (CIPRO)      -08                          tablet           st



     500 MG      00:00: 00:00                          (500 mg           Hospita



     tablet      00   :00                           total) by           l



                                                  mouth 2           



                                                  (two)           



                                                  times a           



                                                  day.           

 

     ciprofloxac      2022- No             500mg Q.5D Take 1           Me

thodi



     in (CIPRO)                                tablet           st



     500 MG      00:00: 00:00                          (500 mg           Hospita



     tablet      00   :00                           total) by           l



                                                  mouth 2           



                                                  (two)           



                                                  times a           



                                                  day.           

 

     cholecalcif      -0      Yes            2000U QD   Take 1           Met

hodi



     mariah,      1-04                               capsule           st



     vitamin D3,      00:00:                               (2,000           Hosp

tina



     50 mcg      00                                 Units           l



     (2,000                                         total) by           



     unit)                                         mouth           



     capsule                                         daily.           



     capsule                                                        

 

     cholecalcif      -0      Yes            2000U QD   Take 1           Met

hodi



     mariah,      1-04                               capsule           st



     vitamin D3,      00:00:                               (2,000           Hosp

tina



     50 mcg      00                                 Units           l



     (2,000                                         total) by           



     unit)                                         mouth           



     capsule                                         daily.           



     capsule                                                        

 

     ascorbic      2023- No             1000mg QD   Take 1           [facility]

que



     acid,       01-05                          tablet           st



     vitamin C,      00:00: 05:59                          (1,000 mg           H

ospita



     (VITAMIN C)      00   :00                           total) by           l



     1000 MG                                         mouth           



     tablet                                         daily.           

 

     ascorbic      -2023- No             1000mg QD   Take 1           [facility]

que



     acid,       01-05                          tablet           st



     vitamin C,      00:00: 05:59                          (1,000 mg           H

ospita



     (VITAMIN C)      00   :00                           total) by           l



     1000 MG                                         mouth           



     tablet                                         daily.           

 

     calcium      2022- No             2{tbl} Q.5D Take 2           [facility]

di



     carbonate-v      7-30 07-31                          tablets by           s

t



     itamin D3      00:00: 04:59                          mouth 2           Hosp

tina



     500 mg-200      00   :00                           (two)           l



     unit per                                         times a           



     tablet                                         day with           



                                                  meals.           

 

     calcium      2022- No             2{tbl} Q.5D Take 2           [facility]

di



     carbonate-v      7-30 07-31                          tablets by           s

t



     itamin D3      00:00: 04:59                          mouth 2           Hosp

tina



     500 mg-200      00   :00                           (two)           l



     unit per                                         times a           



     tablet                                         day with           



                                                  meals.           

 

     methocarbam      -2022- No             500mg      Take 500           

[facility]



     oL        6-18 09-01                          mg by           st



     (ROBAXIN)      00:00: 00:00                          mouth.           Hospi

ta



     500 MG      00   :00                                          l



     tablet                                                        

 

     methocarbam      2022- No             500mg      Take 500           

[facility]



     oL        6-18 09-01                          mg by           st



     (ROBAXIN)      00:00: 00:00                          mouth.           [facility]

ta



     500 MG      00   :00                                          l



     tablet                                                        

 

     theophyllin            Yes            400mg      Take 400           M

ethodi



     e 400 mg 24      4-30                               mg by           st



     hr tablet      00:00:                               mouth.           Hospit

a



               00                                                l

 

     theophyllin            Yes            400mg      Take 400           M

ethodi



     e 400 mg 24      4-30                               mg by           st



     hr tablet      00:00:                               mouth.           Hospit

a



               00                                                l

 

     fluticasone            Yes                      INHALE 1           Me

thodi



     -umeclidin-      3-16                               PUFF ONCE           st



     vilanter      00:00:                               DAILY FOR           Hosp

tina



     (Trelegy      00                                 30 DAYS           l



     Ellipta)                                                        



     100-62.5-25                                                        



     mcg blister                                                        



     with device                                                        



     powder for                                                        



     inhalation                                                        

 

     fluticasone            Yes                      INHALE 1           Me

thodi



     -umeclidin-      3-16                               PUFF ONCE           st



     vilanter      00:00:                               DAILY FOR           Hosp

tina



     (Trelegy      00                                 30 DAYS           l



     Ellipta)                                                        



     100-62.5-25                                                        



     mcg blister                                                        



     with device                                                        



     powder for                                                        



     inhalation                                                        

 

     predniSONE            Yes                      1 (one)           [facility]

que



     (DELTASONE)      6-17                               time each           st



     10 mg      00:00:                               day            Hospita



     tablet      00                                                l

 

     predniSONE            Yes                      1 (one)           [facility]

que



     (DELTASONE)      6-17                               time each           st



     10 mg      00:00:                               day            Hospita



     tablet      00                                                l

 

     albuterol            Yes            2{puff} Q4H  2 puffs           Me

thodi



     (PROAIR      6-13                               every 4           st



     HFA) 90      00:00:                               (four)           Hospita



     mcg/actuati      00                                 hours as           l



     on inhaler                                         needed.           

 

     albuterol            Yes            2{puff} Q4H  2 puffs           Me

thodi



     (PROAIR      6-13                               every 4           st



     HFA) 90      00:00:                               (four)           [facility]osp[facility]



     mcg/actuati      00                                 hours as           l



     on inhaler                                         needed.           

 

     ipratropium      -0      Yes                 Q.25D Inhale 4           M

ethodi



     (ATROVENT)      6-12                               (four)           st



     0.02 %      00:00:                               times a           Hospita



     nebulizer      00                                 day.           l



     solution                                                        

 

     ipratropium      -0      Yes                 Q.25D Inhale 4           M

ethodi



     (ATROVENT)      6-12                               (four)           st



     0.02 %      00:00:                               times a           Hospita



     nebulizer      00                                 day.           l



     solution                                                        

 

     acetylcyste acetylcyste           No                       acetylcyst      

     [facility]



     ine 200 ine 200                                    eine 200           da



     mg/mL (20 mg/mL (20                                    mg/mL (20           

Medical



     %) solution %) solution                                    %)             G

roup



     USE 1 VIAL USE 1 VIAL                                    solution          

 



     BY   BY                                      USE 1 VIAL           



     NEBULIZER NEBULIZER                                    BY             



     EVERY 6 EVERY 6                                    NEBULIZER           



     HOURS AS HOURS AS                                    EVERY 6           



     NEEDED FOR NEEDED FOR                                    HOURS AS          

 



     7 DAYS 7 DAYS                                    NEEDED FOR           



                                                  7 DAYS           

 

     acetylcyste acetylcyste           No                       acetylcyst      

     [facility]



     ine powder ine powder                                    eine           da



                                                  powder           Medical



                                                                 Group

 

     albuterol albuterol           No                       albuterol           

[facility]



     sulfate HFA sulfate HFA                                    sulfate         

  da



     90   90                                      HFA 90           Medical



     mcg/actuati mcg/actuati                                    mcg/actuat      

     Group



     on aerosol on aerosol                                    ion            



     inhaler inhaler                                    aerosol           



     INHALE 2 INHALE 2                                    inhaler           



     PUFFS BY PUFFS BY                                    INHALE 2           



     MOUTH 4 MOUTH 4                                    PUFFS BY           



     TIMES DAILY TIMES DAILY                                    MOUTH 4         

  



     AS NEEDED AS NEEDED                                    TIMES           



                                                  DAILY AS           



                                                  NEEDED           

 

     ergocalcife ergocalcife           No                       ergocalcif      

     [facility]



     rol  rol                                     mariah           da



     (vitamin (vitamin                                    (vitamin           Med

ical



     D2) 1,250 D2) 1,250                                    D2) 1,250           

Group



     mcg (50,000 mcg (50,000                                    mcg            



     unit) unit)                                    (50,000           



     capsule capsule                                    unit)           



                                                  capsule           

 

     ipratropium ipratropium           No                       ipratropiu      

     [facility]



     0.5  0.5                                     m 0.5           da



     mg-albutero mg-albutero                                    mg-albuter      

     Medical



     l 3 mg (2.5 l 3 mg (2.5                                    ol 3 mg         

  Group



     mg base)/3 mg base)/3                                    (2.5 mg           



     mL   mL                                      base)/3 mL           



     nebulizatio nebulizatio                                    nebulizati      

     



     n soln USE n soln USE                                    on soln           



     1 VIAL IN 1 VIAL IN                                    USE 1 VIAL          

 



     NEBULIZER 4 NEBULIZER 4                                    IN             



     TIMES DAILY TIMES DAILY                                    NEBULIZER       

    



     AS NEEDED AS NEEDED                                    4 TIMES           



                                                  DAILY AS           



                                                  NEEDED           

 

     theophyllin theophyllin           No                       theophylli      

     [facility]



     e  mg e  mg                                    ne        

    da



     tablet,exte tablet,exte                                    mg             M

edical



     nded nded                                    tablet,ext           Group



     release,12 release,12                                    ended           



     hr TAKE 1 hr TAKE 1                                    release,12          

 



     TABLET BY TABLET BY                                    hr TAKE 1           



     MOUTH ONCE MOUTH ONCE                                    TABLET BY         

  



     DAILY DAILY                                    MOUTH ONCE           



                                                  DAILY           

 

     Trelegy Trelegy           No                       Trelegy           [facility]

r



     Ellipta 100 Ellipta 100                                    Ellipta         

  da



     mcg-62.5 mcg-62.5                                    100            Medical



     mcg-25 mcg mcg-25 mcg                                    mcg-62.5          

 Group



     powder for powder for                                    mcg-25 mcg        

   



     inhalation inhalation                                    powder for        

   



     INHALE 1 INHALE 1                                    inhalation           



     PUFF BY PUFF BY                                    INHALE 1           



     MOUTH ONCE MOUTH ONCE                                    PUFF BY           



     DAILY DAILY                                    MOUTH ONCE           



                                                  DAILY           

 

     zinc zinc           No                       zinc           [facility]



     sulfate 50 sulfate 50                                    sulfate 50        

   da



     mg zinc mg zinc                                    mg zinc           [facility]

l



     (220 mg) (220 mg)                                    (220 mg)           [facility]

up



     capsule capsule                                    capsule           



     TAKE 1 TAKE 1                                    TAKE 1           



     CAPSULE BY CAPSULE BY                                    CAPSULE BY        

   



     MOUTH ONCE MOUTH ONCE                                    MOUTH ONCE        

   



     DAILY DAILY                                    DAILY           







Vital Signs







             Vital Name   Observation Time Observation Value Comments     Source

 

             BP Diastolic 2022 00:00:00 70 mm[Hg]                 [facility]

[facility]



                                                                 Group

 

             Height       2022 00:00:00 71 [in_i]                 [facility]



                                                                 [facility]

 

             BMI (Body Mass 2022 00:00:00 22 kg/m2                  [facility]



             Index)                                              Group

 

             BP Systolic  2022 00:00:00 121 mm[Hg]                [facility]

[facility]



                                                                 Group

 

             Body Weight  2022 00:00:00 158 [lb_av]               [facility]

[facility]



                                                                 Group

 

             BP Diastolic 2022 00:00:00 88 mm[Hg]                 [facility]

[facility]



                                                                 Group

 

             Height       2022 00:00:00 71 [in_i]                 [facility]

[facility]



                                                                 Group

 

             BMI (Body Mass 2022 00:00:00 22.1 kg/m2                [facility]



             Index)                                              Group

 

             BP Systolic  2022 00:00:00 142 mm[Hg]                [facility]

[facility]



                                                                 Group

 

             Body Weight  2022 00:00:00 158.6 [lb_av]              [facility]

[facility]



                                                                 Group

 

             Systolic blood 2022 20:22:00 116 mm[Hg]                [facility]



             pressure                                            

 

             Diastolic blood 2022 20:22:00 59 mm[Hg]                 [facility]



             pressure                                            

 

             Heart rate   2022 20:22:00 80 /min                   [facility]

 

             Body temperature 2022 20:22:00 36.89 Rosi                 [facility]

 

             Respiratory rate 2022 20:22:00 22 /min                   [facility]

 

             Oxygen saturation in 2022 20:22:00 98 /min                   

[facility]



             Arterial blood by                                        



             Pulse oximetry                                        

 

             Body height  2022 15:01:00 180.3 cm                  [facility]

 

             Body weight  2022 15:01:00 71.4 kg                   [facility]

 

             BMI          2022 15:01:00 21.95 kg/m2               [facility]







Procedures







                Procedure       Date / Time     Performing Clinician Source



                                Performed                       

 

                ANAEROBIC CULTURE 2022 16:32:00 [facility]

 

                FUNGUS CULTURE  2022 16:32:00 [facility]

 

                AEROBIC CULTURE 2022 16:32:00 [facility]

 

                GRAM STAIN      2022 16:32:00 [facility]

 

                MD AN ELECTIVE  2022 16:06:00 Dario Schneider [facility]



                ENDOTRACHEAL AIRWAY                                 

 

                SINUS SURGERY,  2022 15:53:00 [facility]



                ENDOSCOPIC                                      

 

                SURGICAL PATHOLOGY 2022 13:55:00 Irene Flores [facility]



                REQUEST                                         

 

                COVID-19 QUALITATIVE 2022 22:00:00 Irene Flores [facility]



                RT-PCR                                          

 

                unlisted imaging order 2022 00:00:00                 [facility]



                                                                Group







Plan of Care







             Planned Activity Planned Date Details      Comments     Source

 

             Future Scheduled 2023   COVID-19 VACCINE (#1)              [facility]



             Test         16:52:49     [code = COVID-19              



                                       VACCINE (#1)]              

 

             Future Scheduled 2023   Pneumococcal Vaccine:              [facility]



             Test         16:52:49     Pediatrics (0 to 5              



                                       Years) and At-Risk              



                                       Patients (6 to 64              



                                       Years) (1 - PCV) [code              



                                       = Pneumococcal              



                                       Vaccine: Pediatrics (0              



                                       to 5 Years) and              



                                       At-Risk Patients (6 to              



                                       64 Years) (1 - PCV)]              

 

             Future Scheduled 2023   Hepatitis C screening              [facility]



             Test         16:52:49     (procedure) [code =              



                                       512856022]                

 

             Future Scheduled 2023   COLONOSCOPY SCREENING              [facility]



             Test         16:52:49     [code = COLONOSCOPY              



                                       SCREENING]                

 

             Future Scheduled 2023   SHINGLES VACCINES (1              Met

[facility]



             Test         16:52:49     of 2) [code = SHINGLES              



                                       VACCINES (1 of 2)]              

 

             Future Scheduled 2023   INFLUENZA VACCINE              Method

[facility]



             Test         16:52:49     [code = INFLUENZA              



                                       VACCINE]                  

 

             Future Scheduled 2022   COVID-19 VACCINE (#1)              [facility]



             Test         21:07:58     [code = COVID-19              



                                       VACCINE (#1)]              

 

             Future Scheduled 2022   Pneumococcal Vaccine:              [facility]



             Test         21:07:58     Pediatrics (0 to 5              



                                       Years) and At-Risk              



                                       Patients (6 to 64              



                                       Years) (1 - PCV) [code              



                                       = Pneumococcal              



                                       Vaccine: Pediatrics (0              



                                       to 5 Years) and              



                                       At-Risk Patients (6 to              



                                       64 Years) (1 - PCV)]              

 

             Future Scheduled 2022   Hepatitis C screening              [facility]



             Test         21:07:58     (procedure) [code =              



                                       671226932]                

 

             Future Scheduled 2022   COLONOSCOPY SCREENING              [facility]



             Test         21:07:58     [code = COLONOSCOPY              



                                       SCREENING]                

 

             Future Scheduled 2022   SHINGLES VACCINES (1              Met

[facility]



             Test         21:07:58     of 2) [code = SHINGLES              



                                       VACCINES (1 of 2)]              

 

             Future Scheduled 2022   INFLUENZA VACCINE              Method

[facility]



             Test         21:07:58     [code = INFLUENZA              



                                       VACCINE]                  







Encounters







        Start   End     Encounter Admission Attending Care    Care    Encounter 

Source



        Date/Time Date/Time Type    Type    Clinicians Facility Department ID   

   

 

        2023-02-15 2023-02-15 Nurse           DEB Wiggins    1.2.840.114 [id number]

[address] [facility]



        00:00:00 00:00:00 Triage          Thais DIXON   350.1.13.10        

 ity [facility] 4.2.7.2.686         Dylan

as



                                                        [phone]         [address]

 

        2023 Outpatient         Yan_W   MMG     MMG     [id number]

023 [place]



        00:00:00 00:00:00                                         0101    da



                                                                        Medical



                                                                        Group

 

        2022-10-14 2022-10-14 Outpatient         Yan_W   MMG     MMG     [id number]

022 [place]



        00:00:00 00:00:00                                         1014    da



                                                                        Medical



                                                                        Group

 

        2022 Outpatient         Yan_W   MMG     MMG     [id number]

022 [place]



        00:00:00 00:00:00                                         0911    



                                                                        Medical



                                                                        Group

 

        2022 [facility] 1.2.840.1 [id number]

[id number] Methodi



        08:50:00 23:59:00 Encounter         Irene Wiggins 82261.1.1         [address]   

  [address]



                                                3.430.2.7                 Hospit

a



                                                .3.311933                 l



                                                .8                      

 

        2022 [facility] 1.2.840.1 [id number]

[id number] Methodi



        08:50:00 23:59:00 Encounter         Irene Wiggins 15517.1.1         [address]   

  [address]



                                                3.430.2.7                 Hospit

a



                                                .3.855648                 l



                                                .8                      

 

        2022 [facility] 1.2.840.1 [id number]

1429 Methodi



        11:00:00 15:05:00                 Irene Wiggins 68729.1.1         [address]     

[address]



                                                3.430.2.7                 Hospit

a



                                                .3.154275                 l



                                                .8                      

 

        2022 [facility], 1.2.840.1 [id number]

1429 Methodi



        11:00:00 15:05:00                 Irene Wiggins 73502.1.1         [address]     

[address]



                                                3.430.2.7                 Hospit

a



                                                .3.011568                 l



                                                .8                      

 

        2022 Anesthesia         Jennifer Grace 1.2.840.1 [id number] 

[id number] 

Methodi



        10:53:00 13:57:00 Event           Julián Faria 65701.1.1         013     st



                                                3.430.2.7                 Hospit

a



                                                .3.014242                 l



                                                .8                      

 

        2022 Anesthesia         Jennifer Grace 1.2.840.1 [id number] 

[id number] 

Methodi



        10:53:00 13:57:00 Event           Julián Faria 16298.1.1         013     st



                                                3.430.2.7                 Hospit

a



                                                .3.581133                 l



                                                .8                      

 

        2022 Travel                  1.2.840.1 1.2.078.480 [id number]

[id number] Methodi



        00:00:00 00:00:00                         56904.1.1 350.1.13.43 801     

st



                                                3.430.2.7 0.2.7.3.698         Ho

spita



                                                .3.501937 084.8           l



                                                .8                      

 

        2022 Travel                  1.2.840.1 1.2.345.175 7465

[id number] Methodi



        00:00:00 00:00:00                         09709.1.1 350.1.13.43 801     

st



                                                3.430.2.7 0.2.7.3.698         Ho

spita



                                                .3.447839 084.8           l



                                                .8                      

 

        2022 Pre-Admiss         Flores, 1.2.840.1 [id number] 

[id number] Methodi



        14:00:00 15:00:00 charlie Wiggins 52149.1.1         825     

st



                        Testing                 3.430.2.7                 Hospit

a



                                                .3.513743                 l



                                                .8                      

 

        2022 Pre-Admiss         Flores, 1.2.840.1 [id number] 

[id number] Methodi



        14:00:00 15:00:00 charlie Wiggins 79952.1.1         825     

st



                        Testing                 3.430.2.7                 Hospit

a



                                                .3.574069                 l



                                                .8                      

 

        2022 Travel                  1.2.840.1 1.2.750.926 6729

[id number] Methodi



        00:00:00 00:00:00                         74710.1.1 350.1.13.43 234     

st



                                                3.430.2.7 0.2.7.3.698         Ho

spita



                                                .3.711124 084.8           l



                                                .8                      

 

        2022 Travel                  1.2.840.1 1.2.299.418 0274

[id number] Methodi



        00:00:00 00:00:00                         15206.1.1 350.1.13.43 234     

st



                                                3.430.2.7 0.2.7.3.698         Ho

spita



                                                .3.596098 084.8           l



                                                .8                      

 

        2022 Outpatient         Yan_W   [facility]     [id number]

022 [place]



        00:00:00 00:00:00                                         0805    



                                                                        Medical



                                                                        Group

 

        2022 Outpatient         [place] [address]

 [facility]



        00:00:00 00:00:00                 REYNA                    0728    [facility]



                                                                        Program

 

        2022 Outpatient         Yan_W   [facility]     [id number]

022 [place]



        05:23:00 05:23:00                                         0702    [facility]

 

        2022 Outpatient         Yan[facility]W   [facility]     [id number]

022 [place]



        04:47:00 04:47:00                                         0616    



                                                                        Medical



                                                                        Group

 

        2022 Luis Felipe Cowart                 MM     [place] -    [id number]

6 [place]



        00:00:00 00:00:00 MD: Ramirez Humphreys         [facility],                         Network         Group



                        [address],                         [facility],                         Otolaryngol         



                        TOM Navarrete         



                        [address]                                         



                        , Ph.                                           



                        [phone]                                         

 

        2022-06-10 2022-06-10 Outpatient         Yan_W   [facility]     [id number]

022 [place]



        04:57:00 04:57:00                                         0610    [facility]

 

        2022 Outpatient         Yan_W   MMG     [facility]     [id number]

022 [place]



        04:54:00 04:54:00                                         0602    



                                                                        Medical



                                                                        Group

 

        2022 Outpatient         Yan_W   MMG     [facility]     [id number]

022 [place]



        04:54:00 04:54:00                                         0601    



                                                                        Medical



                                                                        Group

 

        2022 Luis Felipe Cowart MM     [place] -    [id number]

1 [place]



        00:00:00 00:00:00 MD: Ramirez Humphreys         [facility],                         Network         Group



                        [address],                         [facility],                         Otolaryngol         



                        [facility]         



                        [address]                                         



                        , Ph.                                           



                        [phone]                                         

 

        2022 Outpatient         Yan_W   [facility]     [id number]

022 [place]



        03:05:00 03:05:00                                         0526    



                                                                        Medical



                                                                        Group

 

        2021 Outpatient         SUN,    [facility]     [id number]

[address] [place]



        00:00:00 00:00:00                 JAKOB                 861     Method

i



                                                                        st

 

        2021 Outpatient         SUN,    [facility]     [id number]

[address] [place]



        00:00:00 00:00:00                 JAKOB                 779     Method

i



                                                                        st

 

        2021 Outpatient         SUN,    [facility]     [id number]

[address] [place]



        00:00:00 00:00:00                 JAKOB                 103     Method

i



                                                                        st

 

        2021 Outpatient         SUN,    [facility]     [id number]

[address] [place]



        00:00:00 00:00:00                 JAKOB                 563     Method

i



                                                                        st

 

        2020 Letter          DEB Shen    1.2.840.114 [id number]

52 [facility]



        00:00:00 00:00:00 (Out)           Madina DIXON   350.1.13.10         [facility] 4.2.7.2.686         D[place]

as



                                                        [phone]         [address]

 

        2020 DEB Sorto    1.2.840.114 [id number]

52 



        00:00:00 00:00:00 (Out)           Madina DIXON   350.1.13.10         



                                                [facility] 4.2.7.2.686         



                                                        [phone]         



                                                        019             

 

        2020 Laboratory         Lab, [facility] Fam Pob I [facility]    1.2.

840.114 [id number] 

Univers



        13:13:24 13:34:21 Only            Antoinette Ocasio  350.1.13.10    

     ity [facility] 4.2.7.2.686         Dylan

as



                                                Professio [phone]         Me

dical



                                                [place] [address]             Branch



                                                Office                  



                                                Building                 



                                                One                     

 

        2020 Laboratory         Lab, [facility]    1.2.840.114 [id number]

[id number] 



        13:13:24 13:34:21 Only            [facility] I [facility]  350.1.13.10         



                                                [place] 4.2.7.2.686         



                                                [employer]fess[facility] [phone]         



                                                [place] [address]             



                                                Office                  



                                                Building                 



                                                One                     

 

        2020 Outpatient R       ABIGAIL  [facility]    [id number]

757 [facility]



        13:00:00 13:00:00                 ANTOINETTE lackey [facility]







Results







           Test Description Test Time  Test Comments Results    Result Comments 

Source









                    Fungus culture      2022-10-05 00:15:00 









                      Test Item  Value      Reference Range Interpretation Comme

nts









             Fungus culture isolate No growth after 4 weeks of                  

         Specimen InformationSpecimen



             (test code = 580-1) incubation.                            Source: 

FluidSpecimen Site:



                                                                 Sinus: Right Et

hmoid Sinus



[facility] culture2022-10-05 00:15:00





             Test Item    Value        Reference Range Interpretation Comments

 

             Fungus culture No growth                              Specimen



             isolate (test after 4 weeks                           InformationSp

ecimen



             code = 580-1) of                                     Source: FluidS

pecimen



                          incubation.                            Site: Sinus: Ri

ght Ethmoid



                                                                 Sinus



[facility] [id number] 13:04:00





             Test Item    Value        Reference Range Interpretation Comments

 

             Anaerobic    No anaerobic                           Specimen



             culture isolate organisms                              InformationS

pecimen



             (test code = isolated.                              Source: FluidSp

ecimen



             [id number])                                            Site: Sinus: Ri

ght



                                                                 Ethmoid Sinus



[facility] [id number] 13:04:00





             Test Item    Value        Reference Range Interpretation Comments

 

             Anaerobic    No anaerobic                           Specimen



             culture isolate organisms                              InformationS

pecimen



             (test code = isolated.                              Source: FluidSp

ecimen



             12840-0)                                            Site: Sinus: Ri

ght



                                                                 Ethmoid Sinus



[facility] [id number] 19:19:20





             Test Item    Value        Reference Range Interpretation Comments

 

             Case number (test code = [id number]                           



             [id number])                                            

 

             Surgical pathology See link below for                           



             report (test code = PDF Lab Report                           



             2255)                                               

 

             Result status (test code This is Final Report                      

     



             = 6115637)   for [id number]                           



[facility] [id number] 19:19:20





             Test Item    Value        Reference Range Interpretation Comments

 

             Case number (test code = [id number]                           



             [id number])                                            

 

             Surgical pathology See link below for                           



             report (test code = PDF Lab Report                           



             2255)                                               

 

             Result status (test code This is Final Report                      

     



             = 5894506)   for [id number]                           



[facility] [id number] 01:46:00





             Test Item    Value        Reference    Interpretation Comments



                                       Range                     

 

             Aerobic culture Normal                    A            Specimen



             isolate (test code respiratory                            Informati

onSpecimen



             = 39727-6)   floraFew                               Source: [facility]



                                                                 Site: Sinus: Ri

ght



                                                                 Ethmoid Sinus

 

             Lab Interpretation Abnormal                               



             (test code =                                        



             84578-4)                                            



[facility] [id number] 01:46:00





             Test Item    Value        Reference    Interpretation Comments



                                       Range                     

 

             Aerobic culture Normal                    A            Specimen



             isolate (test code respiratory                            Informati

onSpecimen



             = 95986-8)   floraFew                               Source: [facility]

[facility]



                                                                 Site: Sinus: Ri

ght



                                                                 Ethmoid Sinus

 

             Lab Interpretation Abnormal                               



             (test code =                                        



             99762-0)                                            



[facility] [id number] 14:52:00





             Test Item    Value        Reference Range Interpretation Comments

 

             Fungus smear No fungi                               Specimen



             (test code = observed.                              InformationSpec

imen Source:



             1443)                                               FluidSpecimen S

ite: Sinus:



                                                                 Right Ethmoid S

inus



[facility] [id number] 14:52:00





             Test Item    Value        Reference Range Interpretation Comments

 

             Fungus smear No fungi                               Specimen



             (test code = observed.                              InformationSpec

imen Source:



             1443)                                               FluidSpecimen S

ite: Sinus:



                                                                 Right Ethmoid S

inus



[facility] [id number] 04:08:00





             Test Item    Value        Reference Range Interpretation Comments

 

             Gram stain   No WBC's or                            Specimen



             isolate (test organisms seen.                           Information

Specimen



             code = 1469)                                        Source: FluidSp

ecimen



                                                                 Site: Sinus: Ri

t



                                                                 Ethmoid Sinus



[facility] [id number] 04:08:00





             Test Item    Value        Reference Range Interpretation Comments

 

             Gram stain   No WBC's or                            Specimen



             isolate (test organisms seen.                           Information

Specimen



             code = 1469)                                        Source: FluidS

ecimen



                                                                 Site: Sinus: Ri

[facility]



                                                                 Ethmoid Sinus



[facility] [Presence] in Respiratory specimen 
by JUANI with probe [id number] 21:22:50





             Test Item    Value        Reference Range Interpretation Comments

 

             SARS-CoV-2 (COVID-19) RNA Not detected                           



             [Presence] in Respiratory                                        



             specimen by JUANI with probe                                        



             detection (test code = 61087-3)                                    

    

 

             Whether patient is employed in a Unknown                           

     



             healthcare setting (test code =                                    

    



             70609-8)                                            

 

             Whether the patient has symptoms Unknown                           

     



             related to condition of interest                                   

     



             (test code = 11904-6)                                        

 

             Whether the patient was Unknown                                



             hospitalized for condition of                                      

  



             interest (test code = 02214-9)                                     

   

 

             Whether the patient was admitted Unknown                           

     



             to intensive care unit (ICU) for                                   

     



             condition of interest (test code                                   

     



             = 41297-0)                                          

 

             Whether patient resides in a Unknown                               

 



             congregate care setting (test                                      

  



             code = 38994-7)                                        

 

             Pregnancy status (test code = Unknown                              

  



             22039-1)                                            

 

             Date and time of symptom onset Unknown                             

   



             (test code = 60114-2)                                        



RAMIREZ Jew WEST

## 2023-05-18 NOTE — RAD REPORT
[facility] DES[facility]  [facility]59 pm

 

CLINICAL HISTORY:  SOB

 

COMPARISON:  Chest Single View dated 2/16/2023; Chest Single View dated 11/18/2022; Chest Single View
 dated 6/6/2021; Chest Single View dated 1/3/2021

 

TECHNIQUE:   Portable AP view of the chest.

 

FINDINGS:  Subtle patchy diffuse airspace opacities. Background interstitial thickening and hyperinfl
ation. No pneumothorax or effusion. The cardiomediastinal contours are unremarkable.

 

IMPRESSION:  Patchy subtle airspace opacities present throughout. Findings may relate to multifocal p
neumonia or COPD exacerbation.

## 2023-05-18 NOTE — ER
Nurse's Notes                                                                                     

 [facility]                                                                 

Name: Jaxon Taylor                                                                             

Age: 63 yrs                                                                                       

Sex: Male                                                                                         

: 1960                                                                                   

MRN: [id number]                                                                                   

Arrival Date: 2023                                                                          

Time: 14:39                                                                                       

Account#: [id number]                                                                            

Bed 15                                                                                            

Private MD:                                                                                       

Diagnosis: Dyspnea;Pneumonia due to other specified infectious organisms                          

                                                                                                  

Presentation:                                                                                     

                                                                                             

15:09 Chief complaint: Patient states: Has seen Dr Ha x 3 weeks and was given an        vg1 

      antibiotic for pseudomonas. States using uses 2 L NC at home and is using 6L now.           

      Denies CP at this time. Also states burning upon urination and coloration is grey and       

      cloudy. Coronavirus screen: Vaccine status: Patient reports being unvaccinated. Client      

      denies travel out of the [place] in the last 14 days. Ebola Screen: Patient negative for       

      fever greater than or equal to 101.5 degrees Fahrenheit, and additional compatible          

      Ebola Virus Disease symptoms Patient denies exposure to infectious person. Patient          

      denies travel to an Ebola-affected area in the 21 days before illness onset. Initial        

      Sepsis Screen: Does the patient meet any 2 criteria? RR > 20 per min. HR > 90 bpm. Does     

      the patient have a suspected source of infection? No. Patient's initial sepsis screen       

      is negative. Risk Assessment: Do you want to hurt yourself or someone else? Patient         

      reports no desire to harm self or others. Onset of symptoms was May 18, 2023.               

15:09 Method Of Arrival: Wheelchair                                                           vg1 

15:09 Acuity: FROILAN 3                                                                           vg1 

                                                                                                  

Triage Assessment:                                                                                

15:13 General: Appears in no apparent distress. uncomfortable. Pain: Complains of pain in     vg1 

      pelvis Pain currently is 5 out of 10 on a pain scale. Respiratory: Reports shortness of     

      breath at rest on exertion cough that is Onset: The symptoms/episode began/occurred x 3     

      weeks, the patient has moderate shortness of breath. : Reports burning with urination.    

                                                                                                  

Historical:                                                                                       

- Allergies:                                                                                      

15:13 No Known Allergies;                                                                     vg1 

- PMHx:                                                                                           

15:13 COPD; Emphysema; osteoarthritis;                                                        vg1 

                                                                                                  

- Immunization history:: Client reports having NOT received the Covid vaccine.                    

- Social history:: Smoking status: Patient reports the use of cigarette tobacco                   

  products, smokes one-half pack cigarettes per day.                                              

                                                                                                  

                                                                                                  

Screening:                                                                                        

15:30 [facility] ED Fall Risk Assessment (Adult) History of falling in the last 3 months,       ko1 

      including since admission No falls in past 3 months (0 pts) Confusion or Disorientation     

      No (0 pts) Intoxicated or Sedated No (0 pts) Impaired Gait No (0 pts) Mobility Assist       

      Device Used No (0 pt) Altered Elimination No (0 pt) Score/Fall Risk Level 0 - 2 = Low       

      Risk Oriented to surroundings, Maintained a safe environment, Educated pt \T\ family on     

      fall prevention, incl call for assistance when getting out of bed, Assessed \T\             

      reinforced patient's understanding of fall precautions, Provided non-skid footwear,         

      Hourly rounding (assess needs \T\ fall precautionary measures) done, Used ambulatory aids   

      as needed (educated on \T\ assisted with), Used gait belt as appropriate. Abuse screen:     

      Denies threats or abuse. Denies injuries from another. Nutritional screening: No            

      deficits noted. Tuberculosis screening: No symptoms or risk factors identified.             

                                                                                                  

Assessment:                                                                                       

15:30 Neuro: No deficits noted. Cardiovascular: Rhythm is regular. Respiratory: Airway is     ko1 

      patent Respiratory effort is pursed lip, Respiratory pattern is tachypnea Breath sounds     

      are diminished bilaterally. Breath sounds with wheezes bilaterally. GI: No deficits         

      noted. : No deficits noted. EENT: No deficits noted. Derm: No deficits noted.             

      Musculoskeletal: No deficits noted.                                                         

19:30 Reassessment: Patient and/or family updated on plan of care and expected duration. Pain jb4 

      level reassessed. Pt is resting in bed with eyes closed, respirations are tachypneic        

      and labored. Denies any concerns at this time and reports feeling better.                   

20:20 Reassessment: Patient appears in no apparent distress at this time. No changes from     jb4 

      previously documented assessment. Patient and/or family updated on plan of care and         

      expected duration. Pain level reassessed.                                                   

20:36 Reassessment: attempted to call report, instructed to wait for call back.               jb4 

20:52 Reassessment: Patient appears in no apparent distress at this time. No changes from     jb4 

      previously documented assessment. Patient and/or family updated on plan of care and         

      expected duration. Pain level reassessed. attempted to call report, instructed to wait      

      for call back.                                                                              

21:39 Reassessment: Patient appears in no apparent distress at this time. Patient and/or      jb4 

      family updated on plan of care and expected duration. Pain level reassessed. Patient is     

      alert, oriented x 3, equal unlabored respirations, skin warm/dry/pink.                      

                                                                                                  

Vital Signs:                                                                                      

15:09  / 68; Pulse 97; Resp 24; Temp 98.8(TE); Pulse Ox 95% on 6 lpm NC; Weight 76.2    vg1 

      kg; Height 5 ft. 11 in. ; Pain 5/10;                                                        

15:22  / 73; Pulse 95; Resp 25; Temp 97.9; Pulse Ox 96% on 4 lpm NC;                    rs5 

20:20  / 56; Pulse 98; Resp 24; Pulse Ox 99% on 4 lpm NC;                               jb4 

21:30  / 54; Pulse 86; Resp 26; Pulse Ox 98% on 4 lpm NC;                               jb4 

15:09 Body Mass Index 23.43 (76.20 kg, 180.34 cm)                                             vg1 

15:09 Pain Scale: Adult                                                                       vg1 

                                                                                                  

ED Course:                                                                                        

14:42 Patient arrived in ED.                                                                  ts1 

15:06 Renzo Galindo PA is PHCP.                                                                cp  

15:06 Renzo Ferreira MD is Attending Physician.                                             cp  

15:13 Triage completed.                                                                       vg1 

15:13 Arm band placed on.                                                                     vg1 

15:16 Martine Payne, RN is Primary Nurse.                                                     ko1 

15:30 Patient has correct armband on for positive identification. Placed in gown. Bed in low  ko1 

      position. Call light in reach. Side rails up X2. Client placed on continuous cardiac        

      and pulse oximetry monitoring. NIBP monitoring applied. Cardiac monitor on. Door            

      closed. Noise minimized. Lights dimmed. Warm blanket given.                                 

15:30 No provider procedures requiring assistance completed.                                  ko1 

16:00 Chest Single View XRAY In Process Unspecified.                                          EDMS

16:00 BNP Sent.                                                                               ko1 

16:00 Magnesium Sent.                                                                         ko1 

16:00 Blood Culture Adult (2) Sent.                                                           ko1 

16:00 CBC with Diff Sent.                                                                     ko1 

16:00 Inserted saline lock: 22 gauge in right antecubital area, using aseptic technique.      ko1 

      Blood collected. Oxygen administration via nasal cannula \T\ 4L/min.                          

16:01 CMP Sent.                                                                               ko1 

16:01 Lactate w/ 2H reflex if indic. Sent.                                                    ko1 

16:01 Protime (+inr) Sent.                                                                    ko1 

16:01 Ptt, Activated Sent.                                                                    ko1 

16:01 Urinalysis w/ reflexes Sent.                                                            ko1 

17:01 ABG Sent.                                                                               ko1 

17:01 Blood Culture Adult (2) Sent.                                                           ko1 

17:35 Casey Ha MD is Hospitalizing Provider.                                         cp  

18:26 COVID-19 SARS RT PCR Sent.                                                              ko1 

22:21 Patient admitted, IV remains in place.                                                  jb4 

                                                                                                  

Administered Medications:                                                                         

16:38 Drug: MethylPrednisoLONE  mg Route: IVP; Site: right antecubital;                ko1 

17:50 Follow up: Response: No adverse reaction                                                ko1 

17:01 Drug: DuoNeb Nebulize (2.5 mg - 0.5 mg) 3 ml Route: Nebulizer;                          ko1 

17:51 Follow up: Response: No adverse reaction                                                ko1 

18:26 Drug: Cefepime IVPB 1 grams Route: IVPB; Rate: 200 ml/hr; Infused Over: 30 mins; Site:  ko1 

      right forearm;                                                                              

                                                                                                  

                                                                                                  

Medication:                                                                                       

15:30 VIS not applicable for this client.                                                     ko1 

                                                                                                  

Outcome:                                                                                          

17:36 Decision to Hospitalize by Provider.                                                    cp  

22:21 Admitted to Med/surg accompanied by tech, via wheelchair, room 215, with chart.         jb4 

22:21 Condition: stable                                                                           

22:21 Discharge instructions given to patient, Instructed on the need for admit, Demonstrated     

      understanding of instructions.                                                              

22:22 Patient left the ED.                                                                    jb4 

                                                                                                  

Signatures:                                                                                       

Dispatcher [employer]                           EDMS                                                 

Renzo Galindo PA PA cp Bryson, James RN                       RN   jb4                                                  

Jolie Velarde RN                    RN   vg1                                                  

Martine Payne RN                       RN   ko1                                                  

Valerio Riley                               rs5                                                  

Cookie Coyne PAS                     PAS  ts1                                                  

                                                                                                  

**************************************************************************************************

## 2023-05-19 LAB
BUN BLD-MCNC: 15 MG/DL (ref 7–18)
GLUCOSE SERPLBLD-MCNC: 166 MG/DL (ref 74–106)
HCT VFR BLD CALC: 41.9 % (ref 39.6–49)
LYMPHOCYTES # SPEC AUTO: 0.5 K/UL (ref 0.7–4.9)
MCV RBC: 87.6 FL (ref 80–100)
MORPHOLOGY BLD-IMP: (no result)
NT-PROBNP SERPL-MCNC: 40 PG/ML (ref ?–125)
PMV BLD: 8.1 FL (ref 7.6–11.3)
POTASSIUM SERPL-SCNC: 4.5 MEQ/L (ref 3.5–5.1)
RBC # BLD: 4.78 M/UL (ref 4.33–5.43)

## 2023-05-19 RX ADMIN — ALBUTEROL SULFATE SCH: 2.5 SOLUTION RESPIRATORY (INHALATION) at 02:00

## 2023-05-19 RX ADMIN — ALBUTEROL SULFATE SCH MG: 2.5 SOLUTION RESPIRATORY (INHALATION) at 20:55

## 2023-05-19 RX ADMIN — SODIUM CHLORIDE SCH MLS: 9 INJECTION, SOLUTION INTRAVENOUS at 20:53

## 2023-05-19 RX ADMIN — ROFLUMILAST SCH MCG: 500 TABLET ORAL at 08:31

## 2023-05-19 RX ADMIN — SODIUM CHLORIDE SCH MLS: 9 INJECTION, SOLUTION INTRAVENOUS at 08:23

## 2023-05-19 RX ADMIN — ALBUTEROL SULFATE SCH MG: 2.5 SOLUTION RESPIRATORY (INHALATION) at 08:50

## 2023-05-19 RX ADMIN — IPRATROPIUM BROMIDE SCH MG: 0.5 SOLUTION RESPIRATORY (INHALATION) at 14:40

## 2023-05-19 RX ADMIN — IPRATROPIUM BROMIDE SCH MG: 0.5 SOLUTION RESPIRATORY (INHALATION) at 08:50

## 2023-05-19 RX ADMIN — ARFORMOTEROL TARTRATE SCH MCG: 15 SOLUTION RESPIRATORY (INHALATION) at 08:50

## 2023-05-19 RX ADMIN — Medication SCH: at 09:00

## 2023-05-19 RX ADMIN — Medication SCH ML: at 20:53

## 2023-05-19 RX ADMIN — ARFORMOTEROL TARTRATE SCH MCG: 15 SOLUTION RESPIRATORY (INHALATION) at 20:55

## 2023-05-19 RX ADMIN — ALBUTEROL SULFATE SCH MG: 2.5 SOLUTION RESPIRATORY (INHALATION) at 14:40

## 2023-05-19 RX ADMIN — IPRATROPIUM BROMIDE SCH MG: 0.5 SOLUTION RESPIRATORY (INHALATION) at 20:55

## 2023-05-19 RX ADMIN — MUPIROCIN SCH APPL: 20 OINTMENT TOPICAL at 20:53

## 2023-05-19 RX ADMIN — IPRATROPIUM BROMIDE SCH: 0.5 SOLUTION RESPIRATORY (INHALATION) at 02:00
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
## 2023-05-20 VITALS — OXYGEN SATURATION: 98 %

## 2023-05-20 VITALS — SYSTOLIC BLOOD PRESSURE: 113 MMHG | DIASTOLIC BLOOD PRESSURE: 55 MMHG | TEMPERATURE: 98.1 F

## 2023-05-20 PROCEDURE — 02HV33Z INSERTION OF INFUSION DEVICE INTO SUPERIOR VENA CAVA, PERCUTANEOUS APPROACH: ICD-10-PCS

## 2023-05-20 RX ADMIN — Medication SCH ML: at 08:08

## 2023-05-20 RX ADMIN — MUPIROCIN SCH APPL: 20 OINTMENT TOPICAL at 08:07

## 2023-05-20 RX ADMIN — ALBUTEROL SULFATE SCH MG: 2.5 SOLUTION RESPIRATORY (INHALATION) at 08:20

## 2023-05-20 RX ADMIN — Medication SCH: at 08:08

## 2023-05-20 RX ADMIN — IPRATROPIUM BROMIDE SCH MG: 0.5 SOLUTION RESPIRATORY (INHALATION) at 08:20

## 2023-05-20 RX ADMIN — ROFLUMILAST SCH MCG: 500 TABLET ORAL at 08:07

## 2023-05-20 RX ADMIN — IPRATROPIUM BROMIDE SCH MG: 0.5 SOLUTION RESPIRATORY (INHALATION) at 02:00

## 2023-05-20 RX ADMIN — ALBUTEROL SULFATE SCH MG: 2.5 SOLUTION RESPIRATORY (INHALATION) at 02:00

## 2023-05-20 RX ADMIN — ARFORMOTEROL TARTRATE SCH MCG: 15 SOLUTION RESPIRATORY (INHALATION) at 08:20

## 2023-05-20 RX ADMIN — SODIUM CHLORIDE SCH MLS: 9 INJECTION, SOLUTION INTRAVENOUS at 08:06

## 2023-05-20 NOTE — P.DS
Admission Date: 05/18/23


Discharge Date: 05/20/23


Disposition: ROUTINE DISCHARGE


Discharge Condition: FAIR


Reason for Admission: COPD exacerbation resistant Pseudomonas infection





- Problems


(1) COPD exacerbation


Current Visit: No   Status: Acute   


Brief History of Present Illness: 





Patient is 63 years of age with a history of terminal COPD recurrent COPD e

xacerbation failed outpatient therapy with current sputum cultures have shown 

that he is got intermediate resistant to levofloxacin patient has been 

progressively declining over the past 3 to 4-week despite optimal outpatient 

therapy was admitted to the hospital for IV antibiotic possible Port-A-Cath 

placement as patient is going to require a current antibiotic


Hospital Course: 





Patient is 63 years of age with terminal COPD frequent exacerbations admitted to

the hospital recent culture from [facility] showed that he had a resistant 

Pseudomonas infection was resistant to fluoroquinolones sensitive to 

cephalosporins his disease his condition has been getting worse for the past 

month despite optimal therapy at home he had multiple courses of levofloxacin as

a reason for admission course was uncomplicated patient got a PICC line placed





With 2 weeks of cefepime at home we will continue to monitor discharge otherwise

no change in his medication the time of discharge she was feeling better chest 

still congested vital signs stable chest some wheezing


Vital Signs/Physical Exam: 














Temp Pulse Resp BP Pulse Ox


 


 98.1 F   82   17   113/55 L  98 


 


 05/20/23 08:00  05/20/23 08:00  05/20/23 08:00  05/20/23 08:00  05/20/23 08:00








Laboratory Data at Discharge: 














WBC  7.60 thou/uL (4.3-10.9)   05/19/23  04:00    


 


Hgb  13.9 g/dL (13.6-17.9)   05/19/23  04:00    


 


Hct  41.9 % (39.6-49.0)   05/19/23  04:00    


 


Plt Count  321 thou/uL (152-406)   05/19/23  04:00    


 


PT  10.3 SECONDS (9.5-12.5)   05/18/23  15:50    


 


INR  0.94   05/18/23  15:50    


 


APTT  31.4 SECONDS (24.3-36.9)   05/18/23  15:50    


 


Sodium  133 mEq/L (136-145)  L  05/19/23  04:00    


 


Potassium  4.5 mEq/L (3.5-5.1)   05/19/23  04:00    


 


BUN  15 mg/dL (7-18)   05/19/23  04:00    


 


Creatinine  0.80 mg/dL (0.70-1.30)   05/19/23  04:00    


 


Glucose  166 mg/dL ()  H  05/19/23  04:00    


 


Magnesium  2.1 mg/dL (1.6-2.4)   05/18/23  15:50    


 


Total Bilirubin  0.4 mg/dL (0.2-1.0)   05/18/23  15:50    


 


AST  12 U/L (15-37)  L  05/18/23  15:50    


 


ALT  17 U/L (16-61)   05/18/23  15:50    


 


Alkaline Phosphatase  68 U/L ()   05/18/23  15:50    








Home Medications: 








Fluticasone/Umeclidin/Vilanter [Trelegy Ellipta 100-62.5-25] 1 each IH DAILY 

01/04/21 


predniSONE [Deltasone*] 10 mg PO DAILY #30 tab 11/20/22 


Albuterol Inhaler [Ventolin Inhaler*] 1 puff IH PRN PRN 05/18/23 


Albuterol Neb [Proventil 0.083% Neb Soln] 2.5 mg IH Q6H 05/18/23 


Roflumilast [Daliresp*] 500 mcg PO DAILY 05/18/23 


Tamsulosin [Flomax] 0.4 mg PO BEDTIME 05/18/23 





Followup: 


Casey Ha MD [Primary Care Provider] -

## 2023-05-20 NOTE — RAD REPORT
EXAM DESCRIPTION:  RAD - Chest Single View - 5/20/2023 1:12 am

 

CLINICAL HISTORY:  63 years Male, S/P PICC insertion

 

COMPARISON:  Chest radiograph dated 5/18/2023

 

IMPRESSION:  Placement of right upper extremity PICC terminating in the mid SVC.

Chronic lung changes. Unchanged bilateral interstitial opacities.

No pleural effusion. No pneumothorax.

Cardiomediastinal silhouette is within normal limits.

No acute osseous abnormality.

 

Electronically signed by:   Declan Pope DO   5/20/2023 1:58 AM CDT Workstation: [id number]

 

 

 

 

Due to temporary technical issues with the PACS/Fluency reporting system, reports are being signed by
 the in house radiologists without review as a courtesy to insure prompt reporting. The interpreting 
radiologist is fully responsible for the content of the report.

## 2023-05-21 NOTE — EKG
Test Date:    2023-05-18               Test Time:    15:29:57

Technician:   OLIVIER                                     

                                                     

MEASUREMENT RESULTS:                                       

Intervals:                                           

Rate:         94                                     

MO:           132                                    

QRSD:         126                                    

QT:           366                                    

QTc:          457                                    

Axis:                                                

P:            72                                     

MO:           132                                    

QRS:          129                                    

T:            64                                     

                                                     

INTERPRETIVE STATEMENTS:                                       

                                                     

Normal sinus rhythm

Right bundle branch block

Left posterior fascicular block

*** Bifascicular block ***

Abnormal ECG

Compared to ECG 02/16/2023 08:46:35

Left posterior fascicular block now present

Bifascicular block now present

Sinus arrhythmia no longer present



Electronically Signed On 05-21-23 17:35:37 CDT by Emmanuel Rios

## 2023-05-22 NOTE — EKG
Test Date:    2023-05-18               Test Time:    15:29:20

Technician:   OLIVIER                                     

                                                     

MEASUREMENT RESULTS:                                       

Intervals:                                           

Rate:         95                                     

MA:           136                                    

QRSD:         112                                    

QT:           360                                    

QTc:          452                                    

Axis:                                                

P:            73                                     

MA:           136                                    

QRS:          126                                    

T:            68                                     

                                                     

INTERPRETIVE STATEMENTS:                                       

                                                     

Sinus rhythm with fusion complexes

Right bundle branch block

Left posterior fascicular block

*** Bifascicular block ***

Abnormal ECG

Compared to ECG 02/16/2023 08:46:35

Fusion complex(es) now present

Left posterior fascicular block now present

Bifascicular block now present

Sinus arrhythmia no longer present



Electronically Signed On 05-22-23 15:22:36 CDT by Jermaine Luque

## 2023-06-06 ENCOUNTER — HOSPITAL ENCOUNTER (INPATIENT)
Dept: HOSPITAL 97 - ER | Age: 63
LOS: 3 days | Discharge: HOME | DRG: 190 | End: 2023-06-09
Attending: STUDENT IN AN ORGANIZED HEALTH CARE EDUCATION/TRAINING PROGRAM | Admitting: STUDENT IN AN ORGANIZED HEALTH CARE EDUCATION/TRAINING PROGRAM
Payer: COMMERCIAL

## 2023-06-06 VITALS — BODY MASS INDEX: 21.9 KG/M2

## 2023-06-06 DIAGNOSIS — F17.210: ICD-10-CM

## 2023-06-06 DIAGNOSIS — B96.4: ICD-10-CM

## 2023-06-06 DIAGNOSIS — D72.829: ICD-10-CM

## 2023-06-06 DIAGNOSIS — B96.5: ICD-10-CM

## 2023-06-06 DIAGNOSIS — N40.0: ICD-10-CM

## 2023-06-06 DIAGNOSIS — J43.9: Primary | ICD-10-CM

## 2023-06-06 DIAGNOSIS — M79.605: ICD-10-CM

## 2023-06-06 DIAGNOSIS — Z99.81: ICD-10-CM

## 2023-06-06 DIAGNOSIS — J96.21: ICD-10-CM

## 2023-06-06 DIAGNOSIS — Z79.899: ICD-10-CM

## 2023-06-06 DIAGNOSIS — Z16.30: ICD-10-CM

## 2023-06-06 DIAGNOSIS — Z28.310: ICD-10-CM

## 2023-06-06 DIAGNOSIS — M19.90: ICD-10-CM

## 2023-06-06 DIAGNOSIS — Z79.52: ICD-10-CM

## 2023-06-06 DIAGNOSIS — Z90.49: ICD-10-CM

## 2023-06-06 LAB
BUN BLD-MCNC: 15 MG/DL (ref 7–18)
GLUCOSE SERPLBLD-MCNC: 106 MG/DL (ref 74–106)
HCT VFR BLD CALC: 44 % (ref 39.6–49)
INR BLD: 1.01
LYMPHOCYTES # SPEC AUTO: 1.3 K/UL (ref 0.7–4.9)
MAGNESIUM SERPL-MCNC: 2.1 MG/DL (ref 1.6–2.4)
MCV RBC: 86.8 FL (ref 80–100)
NT-PROBNP SERPL-MCNC: 63 PG/ML (ref ?–125)
PMV BLD: 8.4 FL (ref 7.6–11.3)
POTASSIUM SERPL-SCNC: 3.7 MEQ/L (ref 3.5–5.1)
RBC # BLD: 5.07 M/UL (ref 4.33–5.43)
TROPONIN I SERPL HS-MCNC: 8.8 PG/ML (ref ?–58.9)

## 2023-06-06 PROCEDURE — 87040 BLOOD CULTURE FOR BACTERIA: CPT

## 2023-06-06 PROCEDURE — 87077 CULTURE AEROBIC IDENTIFY: CPT

## 2023-06-06 PROCEDURE — 76770 US EXAM ABDO BACK WALL COMP: CPT

## 2023-06-06 PROCEDURE — 87205 SMEAR GRAM STAIN: CPT

## 2023-06-06 PROCEDURE — 99285 EMERGENCY DEPT VISIT HI MDM: CPT

## 2023-06-06 PROCEDURE — 80061 LIPID PANEL: CPT

## 2023-06-06 PROCEDURE — 36600 WITHDRAWAL OF ARTERIAL BLOOD: CPT

## 2023-06-06 PROCEDURE — 81001 URINALYSIS AUTO W/SCOPE: CPT

## 2023-06-06 PROCEDURE — 85025 COMPLETE CBC W/AUTO DIFF WBC: CPT

## 2023-06-06 PROCEDURE — 84100 ASSAY OF PHOSPHORUS: CPT

## 2023-06-06 PROCEDURE — 71275 CT ANGIOGRAPHY CHEST: CPT

## 2023-06-06 PROCEDURE — 83880 ASSAY OF NATRIURETIC PEPTIDE: CPT

## 2023-06-06 PROCEDURE — 93970 EXTREMITY STUDY: CPT

## 2023-06-06 PROCEDURE — 94760 N-INVAS EAR/PLS OXIMETRY 1: CPT

## 2023-06-06 PROCEDURE — 94640 AIRWAY INHALATION TREATMENT: CPT

## 2023-06-06 PROCEDURE — 96366 THER/PROPH/DIAG IV INF ADDON: CPT

## 2023-06-06 PROCEDURE — 71045 X-RAY EXAM CHEST 1 VIEW: CPT

## 2023-06-06 PROCEDURE — 87186 SC STD MICRODIL/AGAR DIL: CPT

## 2023-06-06 PROCEDURE — 96375 TX/PRO/DX INJ NEW DRUG ADDON: CPT

## 2023-06-06 PROCEDURE — 93005 ELECTROCARDIOGRAM TRACING: CPT

## 2023-06-06 PROCEDURE — 94660 CPAP INITIATION&MGMT: CPT

## 2023-06-06 PROCEDURE — 87070 CULTURE OTHR SPECIMN AEROBIC: CPT

## 2023-06-06 PROCEDURE — 85610 PROTHROMBIN TIME: CPT

## 2023-06-06 PROCEDURE — 80048 BASIC METABOLIC PNL TOTAL CA: CPT

## 2023-06-06 PROCEDURE — 82805 BLOOD GASES W/O2 SATURATION: CPT

## 2023-06-06 PROCEDURE — 84484 ASSAY OF TROPONIN QUANT: CPT

## 2023-06-06 PROCEDURE — 85730 THROMBOPLASTIN TIME PARTIAL: CPT

## 2023-06-06 PROCEDURE — 02HV33Z INSERTION OF INFUSION DEVICE INTO SUPERIOR VENA CAVA, PERCUTANEOUS APPROACH: ICD-10-PCS

## 2023-06-06 PROCEDURE — 36415 COLL VENOUS BLD VENIPUNCTURE: CPT

## 2023-06-06 PROCEDURE — 96365 THER/PROPH/DIAG IV INF INIT: CPT

## 2023-06-06 PROCEDURE — 83605 ASSAY OF LACTIC ACID: CPT

## 2023-06-06 PROCEDURE — 83735 ASSAY OF MAGNESIUM: CPT

## 2023-06-06 RX ADMIN — IPRATROPIUM BROMIDE SCH MG: 0.5 SOLUTION RESPIRATORY (INHALATION) at 20:15

## 2023-06-06 RX ADMIN — ALBUTEROL SULFATE SCH MG: 2.5 SOLUTION RESPIRATORY (INHALATION) at 20:15

## 2023-06-06 RX ADMIN — Medication SCH ML: at 22:48

## 2023-06-06 RX ADMIN — ENOXAPARIN SODIUM SCH: 40 INJECTION SUBCUTANEOUS at 18:30

## 2023-06-06 NOTE — RAD REPORT
EXAM DESCRIPTION:  RAD - Chest Single View - 6/6/2023 9:36 pm

 

CLINICAL HISTORY:  PICC placement

 

COMPARISON:  Chest Single View dated 5/20/2023; Chest Single View dated 5/18/2023; Chest Single View 
dated 2/16/2023; Chest Single View dated 11/18/2022

 

FINDINGS:  Portable chest was obtained following placement of a right upper extremity PICC line. The 
catheter tip projects over the SVC.

## 2023-06-06 NOTE — RAD REPORT
EXAM DESCRIPTION:  US - Extrem Venous W Compress Romeo - 6/6/2023 6:05 pm

 

CLINICAL HISTORY:  PAIN

Bilateral leg edema and swelling.

 

COMPARISON:  No comparisons

 

TECHNIQUE:  Real-time sonographic interrogation of the left and right lower extremity deep venous sys
tems was performed.

 

FINDINGS:  Normal compressibility, flow augmentation, phasic flow and spontaneous flow is identified 
in both the left and right lower extremity deep venous systems.

 

IMPRESSION:  No sonographic evidence of left or right lower extremity deep venous thrombosis.

## 2023-06-06 NOTE — ER
Nurse's Notes                                                                                     

 [facility]                                                                 

Name: Jaxon Taylor                                                                             

Age: 63 yrs                                                                                       

Sex: Male                                                                                         

: 1960                                                                                   

MRN: [id number]                                                                                   

Arrival Date: 2023                                                                          

Time: 14:05                                                                                       

Account#: [id number]                                                                            

Bed 8                                                                                             

Private MD: Casey Ha K                                                                   

Diagnosis: COPD/ Chronic obstructive pulmonary disease with (acute) exacerbation;Hypoxemia        

                                                                                                  

Presentation:                                                                                     

                                                                                             

14:25 Acuity: FROILAN 2                                                                           aa5 

14:25 Onset of symptoms was 2023.                                                    aa5 

14:25 Chief complaint: Patient states: SOB and respiratory distress since last night, pt uses aa5 

      home O2 at 2 L NC. Was on IV antibiotics for 2 weeks for "Pseudomonas infection" and        

      completed treatment on Friday. Coronavirus screen: shortness of breath. Ebola Screen:       

      Patient denies travel to an Ebola-affected area in the 21 days before illness onset.        

      Initial Sepsis Screen: Does the patient meet any 2 criteria? RR > 20 per min. HR > 90       

      bpm. Yes Does the patient have a suspected source of infection? Yes:. Risk Assessment:      

      Do you want to hurt yourself or someone else? Patient reports no desire to harm self or     

      others.                                                                                     

14:25 Method Of Arrival: Wheelchair                                                           aa5 

                                                                                                  

Triage Assessment:                                                                                

14:58 Respiratory: Onset: The symptoms/episode began/occurred yesterday.                      nj1 

14:58 Respiratory: Reports shortness of breath labored breathing since Yesterday. the patient nj1 

      has severe shortness of breath.                                                             

                                                                                                  

Historical:                                                                                       

- PMHx:                                                                                           

14:37 COPD; Emphysema; osteoarthritis;                                                        aa5 

                                                                                                  

- Immunization history:: Client reports having NOT received the Covid vaccine.                    

- Family history:: not pertinent.                                                                 

- Social history:: Smoking status: Patient reports the use of cigarette tobacco                   

  products, denies chronic smoking, but will smoke occasionally.                                  

- Hospitalizations: : No recent hospitalization is reported.                                      

                                                                                                  

                                                                                                  

Screenin:57 [facility] ED Fall Risk Assessment (Adult) History of falling in the last 3 months,       nj1 

      including since admission No falls in past 3 months (0 pts) Confusion or Disorientation     

      No (0 pts) Intoxicated or Sedated No (0 pts) Impaired Gait No (0 pts) Mobility Assist       

      Device Used No (0 pt) Altered Elimination No (0 pt) Score/Fall Risk Level 0 - 2 = Low       

      Risk Oriented to surroundings, Maintained a safe environment, Hourly rounding (assess       

      needs \T\ fall precautionary measures) done. Abuse screen: Denies threats or abuse.         

      Denies injuries from another. Nutritional screening: No deficits noted. Tuberculosis        

      screening: No symptoms or risk factors identified.                                          

                                                                                                  

Assessment:                                                                                       

14:58 General: Appears distressed, uncomfortable, Behavior is calm, cooperative, appropriate  nj1 

      for age. Neuro: Level of Consciousness is awake, alert, obeys commands, Oriented to         

      person, place, time, situation. Cardiovascular: Patient's skin is warm and dry. Rhythm      

      is sinus tachycardia. Respiratory: Airway is patent Respiratory effort is labored,          

      Sputum is thin, clear white Breath sounds are coarse Breath sounds are diminished.          

15:36 Reassessment:. General: Appears in no apparent distress. uncomfortable, Behavior is     nj1 

      calm, cooperative, appropriate for age. Respiratory: Respiratory effort is slightly         

      labored, has improved since earlier.                                                        

17:39 Reassessment: Patient appears in no apparent distress at this time. Patient and/or      nj1 

      family updated on plan of care and expected duration. Pain level reassessed. Patient is     

      alert, oriented x 3, equal unlabored respirations, skin warm/dry/pink.                      

18:32 Reassessment: Patient appears in no apparent distress at this time. Patient and/or      nj1 

      family updated on plan of care and expected duration. Pain level reassessed. Patient is     

      alert, oriented x 3, equal unlabored respirations, skin warm/dry/pink.                      

                                                                                                  

Vital Signs:                                                                                      

14:25  / 72; Pulse 117; Resp 34 S; Temp 98.4(O); Pulse Ox 93% on 4 lpm NC;              aa5 

14:56  / 72; Pulse 102; Resp 19; Pulse Ox 99% on Breathing treatment;                   nj1 

15:36  / 71; Pulse 104; Resp 25; Pulse Ox 97% on 4 lpm NC;                              nj1 

16:30  / 82; Pulse 98; Resp 21; Pulse Ox 97% on 4 lpm NC;                               nj1 

17:39  / 72; Pulse 100; Resp 21; Pulse Ox 97% on 4 lpm NC; Pain 0/10;                   nj1 

18:32  / 73; Pulse 95; Resp 22; Pulse Ox 98% on 4 lpm NC;                               nj1 

17:39 Pain Scale: Adult                                                                       [facility] 

                                                                                                  

ED Course:                                                                                        

14:06 Patient arrived in ED.                                                                  mr  

14:06 Dilcia, Casey, MD is Private Physician.                                              mr  

14:25 Arm band placed on Patient placed in an exam room, on a stretcher.                      aa5 

14:27 Juan Pablo Mccann MD is Attending Physician.                                                rn  

14:31 Lizzy Newton, RN is Primary Nurse.                                                       nj1 

14:35 Triage completed.                                                                       aa5 

14:43 Radiology exam delayed due to lab results not completed at this time. (BUN/Creatinine)  [id number]

      IV insertion attempt and/or patient not having appropriate IV at this time.                 

14:46 Flushed left antecubital saline lock.                                                   nj1 

15:00 Patient has correct armband on for positive identification. Bed in low position. Call   nj1 

      light in reach. Side rails up X 1.                                                          

16:01 CT Chest For PE Angio In Process Unspecified.                                           EDMS

16:21 Devon Mccann MD is Hospitalizing Provider.                                           rn  

18:07 Extrem Venous W Compression Romeo US In Process Unspecified.                              EDMS

19:51 No provider procedures requiring assistance completed. Patient admitted, IV remains in  lg3 

      place. intact, No redness/swelling at site.                                                 

                                                                                                  

Administered Medications:                                                                         

14:46 Drug: Decadron - Dexamethasone IVP 10 mg Route: IVP; Site: left antecubital;            nj1 

15:35 Follow up: Response: No adverse reaction                                                nj1 

14:48 Drug: NS 0.9%  ml Route: IV; Rate: bolus; Site: left antecubital;                 nj1 

15:35 Follow up: Response: No adverse reaction; IV Status: Completed infusion; IV Intake:     nj1 

      500ml                                                                                       

14:50 Drug: Magnesium Sulfate IVPB 1 grams Route: IVPB; Infused Over: 1 hrs; Site: left       nj1 

      antecubital;                                                                                

15:35 Follow up: Response: No adverse reaction                                                nj1 

16:30 Follow up: Response: No adverse reaction; IV Status: Completed infusion; IV Intake:     nj1 

      100ml                                                                                       

14:52 Drug: Levalbuterol Inhalation 1.25 mg Route: Inhalation;                                nj1 

15:35 Follow up: Response: No adverse reaction                                                nj1 

14:52 Drug: Ipratropium Inhalation Aerosol 0.5 mg Route: Inhalation;                          nj1 

15:35 Follow up: Response: No adverse reaction                                                nj1 

17:28 CANCELLED (Duplicate Order): Levalbuterol Inhalation 1.25 mg Inhalation once            nj1 

17:28 CANCELLED (Duplicate Order): Ipratropium Inhalation Aerosol 0.5 mg Inhalation once      nj1 

17:39 Drug: Levalbuterol Inhalation 1.25 mg Route: Inhalation;                                nj1 

17:39 Drug: Ipratropium Inhalation Aerosol 0.5 mg Route: Inhalation;                          nj1 

                                                                                                  

                                                                                                  

Medication:                                                                                       

19:52 VIS not applicable for this client.                                                     3 

                                                                                                  

Intake:                                                                                           

15:35 IV: 500ml; Total: 500ml.                                                                nj1 

16:30 IV: 100ml; Total: 600ml.                                                                nj1 

                                                                                                  

Outcome:                                                                                          

16:21 Decision to Hospitalize by Provider.                                                    rn  

19:51 Admitted to Med/surg accompanied by nurse, via wheelchair, room 428, Report called to   [address]                                                                                      

19:51 Condition: stable                                                                           

19:51 Instructed on the need for admit, Demonstrated understanding of instructions.               

20:16 Patient left the ED.                                                                    jb4 

                                                                                                  

Signatures:                                                                                       

Dispatcher [employer]                           EDMS                                                 

Elvis Rita MccannJuan Pablo MD MD rn Calderon, Audri RN                     RN   anna5                                                  

Abhishek Merrill RN RN   jb4                                                  

Salina Doran RN RN lg3                                                  

Carin Handy                               [facility]                                                 

Lizzy Newton, JAVI                         RN   nj1                                                  

                                                                                                  

Corrections: (The following items were deleted from the chart)                                    

14:38 14:25 Chief complaint: Patient states: SOB and respiratory distress since last night,   aa5 

      pt uses home O2 at 2 L NC. aa5                                                              

15:02 15:00 Respiratory: Onset: The symptoms/episode began/occurred yesterday, nj1            nj1 

                                                                                                  

**************************************************************************************************

## 2023-06-06 NOTE — RAD REPORT
EXAM DESCRIPTION:  CT - Chest For Pe Angio - 6/6/2023 3:59 pm

 

CLINICAL HISTORY:  Chest pain.

recent pneumonia;Dyspnea

 

COMPARISON:  Chest Angio dated 6/7/2021

 

TECHNIQUE:  CT angiogram of the pulmonary arteries was performed with MIP.

 

All CT scans are performed using dose optimization technique as appropriate and may include automated
 exposure control or mA/KV adjustment according to patient size.

 

FINDINGS:  No evidence of pulmonary thromboembolism.

 

No acute aortic finding demonstrated.

 

Advanced severe emphysema is present. Chronic tree-in-bud opacity is noted in both lungs.

 

No significant pericardial or pleural fluid.

 

No concerning bony finding.

 

IMPRESSION:  No evidence of pulmonary thromboembolism.

 

Severe emphysema is present.

## 2023-06-06 NOTE — EDPHYS
Physician Documentation                                                                           

 [facility]                                                                 

Name: Jaxon Taylor                                                                             

Age: 63 yrs                                                                                       

Sex: Male                                                                                         

: 1960                                                                                   

MRN: [id number]                                                                                   

Arrival Date: 2023                                                                          

Time: 14:05                                                                                       

Account#: [id number]                                                                            

Bed 8                                                                                             

Private MD: Casey Ha K                                                                   

ED Physician Juan Pablo Mccann                                                                         

HPI:                                                                                              

                                                                                             

14:48 This 63 yrs old Male presents to ER via Wheelchair with complaints of Breathing         rn  

      Difficulty.                                                                                 

14:48 The patient has shortness of breath at rest, with light activity. Onset: The            rn  

      symptoms/episode began/occurred at an unknown time. Duration: The symptoms are              

      continuous. The patient's shortness of breath is aggravated by exertion, light              

      activity, is alleviated by rest, application of supplemental oxygen. Associated signs       

      and symptoms: Pertinent positives: productive cough, Pertinent negatives: fever,            

      hemoptysis. Severity of symptoms: At their worst the symptoms were moderate in the          

      emergency department the symptoms are unchanged. The patient has experienced similar        

      episodes in the past. The patient has been recently been admitted at [facility]. Pt and family report recent admission for pneumonia, had PICC       

      line, finished abx, returns for worsening sob and productive cough. + COPD. No chest        

      pain. No hx of dvt/PE..                                                                     

                                                                                                  

Historical:                                                                                       

- PMHx:                                                                                           

14:37 COPD; Emphysema; osteoarthritis;                                                        aa5 

                                                                                                  

- Immunization history:: Client reports having NOT received the Covid vaccine.                    

- Family history:: not pertinent.                                                                 

- Social history:: Smoking status: Patient reports the use of cigarette tobacco                   

  products, denies chronic smoking, but will smoke occasionally.                                  

- Hospitalizations: : No recent hospitalization is reported.                                      

                                                                                                  

                                                                                                  

ROS:                                                                                              

14:48 Constitutional: Negative for fever, chills, and weight loss, Eyes: Negative for injury, rn  

      pain, redness, and discharge, Cardiovascular: Negative for chest pain, palpitations,        

      and edema, Respiratory: + cough and sob Abdomen/GI: Negative for abdominal pain,            

      nausea, vomiting, diarrhea, and constipation, MS/Extremity: Negative for injury and         

      deformity, Skin: Negative for injury, rash, and discoloration, Neuro: Negative for          

      headache, weakness, and seizure.                                                            

                                                                                                  

Exam:                                                                                             

14:48 Constitutional:  This is a well developed, well nourished patient who is awake, alert,  rn  

      + moderate tachypnea Head/Face:  Normocephalic, atraumatic. ENT:  dry MM, no stridor        

      Cardiovascular:  Tachycardic, regular.  No pulse deficits. Respiratory:  + moderate         

      tachypnea, + end exp wheezing Abdomen/GI:  Soft, non-tender Skin:  Warm, dry  MS/           

      Extremity:  Pulses equal, no cyanosis.  Neurovascular intact.  Full, normal range of        

      motion.  Equal circumference. Neuro:  Awake and alert, GCS 15                               

17:24 ECG was reviewed by the Attending Physician.                                            rn  

                                                                                                  

Vital Signs:                                                                                      

14:25  / 72; Pulse 117; Resp 34 S; Temp 98.4(O); Pulse Ox 93% on 4 lpm NC;              aa5 

14:56  / 72; Pulse 102; Resp 19; Pulse Ox 99% on Breathing treatment;                   nj1 

15:36  / 71; Pulse 104; Resp 25; Pulse Ox 97% on 4 lpm NC;                              nj1 

16:30  / 82; Pulse 98; Resp 21; Pulse Ox 97% on 4 lpm NC;                               nj1 

17:39  / 72; Pulse 100; Resp 21; Pulse Ox 97% on 4 lpm NC; Pain 0/10;                   nj1 

18:32  / 73; Pulse 95; Resp 22; Pulse Ox 98% on 4 lpm NC;                               nj1 

17:39 Pain Scale: Adult                                                                       nj1 

                                                                                                  

MDM:                                                                                              

14:27 Patient medically screened.                                                             rn  

16:20 Differential diagnosis: Anemia Anxiety Reaction Bronchitis CHF exacerbation, Chronic    rn  

      Obstructive Pulmonary Disease Myocardial Infarction pneumonia, Pneumothorax pulmonary       

      edema, Pulmonary Embolism Sepsis. Data reviewed: vital signs, nurses notes, lab test        

      result(s), EKG, radiologic studies, CT scan, and as a result, I will admit patient.         

      Consideration of Admission/Observation Patient was admitted/placed on observation.          

      Escalation of care including admission/observation considered. Independent                  

      interpretation of the following test(s) in the Emergency Department X-Ray: My               

      interpretation is CXR images neg for pneumothorax per my interpretation. Care               

      significantly affected by the following chronic conditions: Chronic Obstructive             

      Pulmonary Disease. Counseling: I had a detailed discussion with the patient and/or          

      guardian regarding: the historical points, exam findings, and any diagnostic results        

      supporting the discharge/admit diagnosis, lab results, radiology results, the need for      

      further work-up and treatment in the hospital.                                              

                                                                                                  

                                                                                             

14:38 Order name: [facility]; Complete Time: 16:09                                                   rn  

                                                                                             

14:38 Order name: Blood Culture Adult (2)                                                     rn  

                                                                                             

14:38 Order name: CBC with Diff; Complete Time: 16:09                                         rn  

                                                                                             

14:38 Order name: NT PRO-BNP; Complete Time: 16:09                                            rn  

                                                                                             

14:38 Order name: PT-INR; Complete Time: 16:09                                                rn  

                                                                                             

14:38 Order name: Ptt, Activated; Complete Time: 16:09                                        rn  

                                                                                             

14:38 Order name: Troponin HS; Complete Time: 16:09                                           rn  

                                                                                             

14:38 Order name: Lactate w/ 2H reflex if indic.; Complete Time: 16:09                        rn  

                                                                                             

16:37 Order name: Sputum Culture; Complete Time: 10:15                                        rn  

                                                                                             

18:47 Order name: Phosphorus; Complete Time: 10:15                                            EDMS

                                                                                             

18:47 Order name: Magnesium; Complete Time: 10:15                                             EDMS

                                                                                             

14:38 Order name: CT Chest For PE Angio; Complete Time: 16:18                                 rn  

                                                                                             

16:37 Order name: Extrem Venous W Compression Romeo US; Complete Time: 10:15                    rn  

                                                                                             

14:38 Order name: EKG; Complete Time: 14:39                                                   rn  

                                                                                             

16:36 Order name: Diet Heart Healthy; Complete Time: 16:37                                    [facility] 

                                                                                             

14:38 Order name: Cardiac monitoring; Complete Time: 14:55                                    rn  

                                                                                             

14:38 Order name: EKG - Nurse/Tech; Complete Time: 15:34                                      rn  

                                                                                             

14:38 Order name: IV Saline Lock; Complete Time: 14:55                                        rn  

                                                                                             

14:38 Order name: Labs collected and sent; Complete Time: 14:55                               rn  

                                                                                             

14:38 Order name: O2 Per Protocol; Complete Time: 14:55                                       rn  

                                                                                             

14:38 Order name: O2 Sat Monitoring; Complete Time: 14:55                                     rn  

                                                                                                  

EC:24 Rate is 105 beats/min. Rhythm is regular. QRS is negative in leads I, aVF. NJ interval  rn  

      is normal. QRS interval is normal. QT interval is normal. No Q waves. T waves are           

      Normal. No ST changes noted. Clinical impression: Sinus tachycardia. Interpreted by me.     

      Reviewed by me.                                                                             

                                                                                                  

Administered Medications:                                                                         

14:46 Drug: Decadron - Dexamethasone IVP 10 mg Route: IVP; Site: left antecubital;            nj1 

15:35 Follow up: Response: No adverse reaction                                                nj1 

14:48 Drug: NS 0.9%  ml Route: IV; Rate: bolus; Site: left antecubital;                 nj1 

15:35 Follow up: Response: No adverse reaction; IV Status: Completed infusion; IV Intake:     nj1 

      500ml                                                                                       

14:50 Drug: Magnesium Sulfate IVPB 1 grams Route: IVPB; Infused Over: 1 hrs; Site: left       nj1 

      antecubital;                                                                                

15:35 Follow up: Response: No adverse reaction                                                nj1 

16:30 Follow up: Response: No adverse reaction; IV Status: Completed infusion; IV Intake:     nj1 

      100ml                                                                                       

14:52 Drug: Levalbuterol Inhalation 1.25 mg Route: Inhalation;                                nj1 

15:35 Follow up: Response: No adverse reaction                                                nj1 

14:52 Drug: Ipratropium Inhalation Aerosol 0.5 mg Route: Inhalation;                          nj1 

15:35 Follow up: Response: No adverse reaction                                                nj1 

17:28 CANCELLED (Duplicate Order): Levalbuterol Inhalation 1.25 mg Inhalation once            nj1 

17:28 CANCELLED (Duplicate Order): Ipratropium Inhalation Aerosol 0.5 mg Inhalation once      nj1 

17:39 Drug: Levalbuterol Inhalation 1.25 mg Route: Inhalation;                                nj1 

17:39 Drug: Ipratropium Inhalation Aerosol 0.5 mg Route: Inhalation;                          nj1 

                                                                                                  

                                                                                                  

Disposition:                                                                                      

16:20 Critical Care:.                                                                         rn  

                                                                                                  

Disposition Summary:                                                                              

23 16:21                                                                                    

Hospitalization Ordered                                                                           

      Hospitalization Status: Observation                                                     rn  

      Provider: Devon Mccann rn  

      [facility]                                               rn  

      Condition: Stable                                                                       rn  

      Problem: an acute exacerbation                                                          rn  

      Symptoms: have improved                                                                 rn  

      Bed/Room Type: Standard                                                                 rn  

      Room Assignment: 428(23 19:36)                                                      

      Diagnosis                                                                                   

        - COPD/ Chronic obstructive pulmonary disease with (acute) exacerbation               rn  

        - Hypoxemia                                                                           rn  

      Forms:                                                                                      

        - Medication Reconciliation Form                                                      rn  

        - SBAR form                                                                           rn  

Critical care time excluding procedures:                                                          

16:20 Critical care time: Bedside Care: 35 minutes. Total time: 35 minutes                    rn  

                                                                                                  

Signatures:                                                                                       

Dispatcher MedHoMary Keenan RN RN                                                      

Juan Pablo Mccann MD MD rn Calderon, Audri, RN RN aa5                                                  

Lizzy Newton RN                         RN   nj1                                                  

                                                                                                  

Corrections: (The following items were deleted from the chart)                                    

16:10 14:39 Chest Single View+RAD.RAD.BRZ ordered. EDMS                                       EDMS

17: 17:27 Levalbuterol Inhalation 1.25 mg Inhalation once ordered. nj1                      nj1 

17: 17:27 Ipratropium Inhalation Aerosol 0.5 mg Inhalation once ordered. nj1                nj1 

19:36 16:21 rn                                                                                mw  

                                                                                                  

**************************************************************************************************

## 2023-06-06 NOTE — P.HP
Certification for Inpatient


Patient admitted to: Inpatient


With expected LOS: >2 Midnights


Patient will require the following post-hospital care: None


Practitioner: I am a practitioner with admitting privileges, knowledge of 

patient current condition, hospital course, and medical plan of care.


Services: Services provided to patient in accordance with Admission requirements

found in Title 42 Section 412.3 of the Code of Federal Regulations





Patient History


Date of Service: 06/06/23


Reason for admission: SOB


History of Present Illness: 


Patient is a 63-year-old male with a past medical history significant for COPD, 

chronic respiratory failure on home O2 therapy, osteoarthritis who presents with

complaint of shortness of breath onset yesterday.  Patient was recently 

discharged from the hospital after being treated for pneumonia and patient 

reported that he completed his IV antibiotics 4 days ago.  Patient reported 

associated signs and symptoms of cough, lightheadedness and left leg pain.  

Patient indicated that he experiences left leg pain at night, rated pain as 9/10

in severity and described pain as pressure\tightness in quality.  Patient denies

any other signs and symptoms.  Symptoms are aggravated or relieved by nothing.  

Patient decided to present to the hospital due to worsening symptoms.





Allergies





No Known Allergies Allergy (Verified 10/02/20 01:41)


   





Home Medications: 








Fluticasone/Umeclidin/Vilanter [Trelegy Ellipta 100-62.5-25] 1 each IH DAILY 

01/04/21 


predniSONE [Deltasone*] 10 mg PO DAILY #30 tab 11/20/22 


Albuterol Inhaler [Ventolin Inhaler*] 1 puff IH PRN PRN 05/18/23 


Albuterol Neb [Proventil 0.083% Neb Soln] 2.5 mg IH Q6H 05/18/23 


Roflumilast [Daliresp*] 500 mcg PO DAILY 05/18/23 


Tamsulosin [Flomax] 0.4 mg PO BEDTIME 05/18/23 








- Past Medical/Surgical History


Diabetic: No


-: COPD - emphysema on home oxygen and chronic steroids


-: History of tobacco abuse


-: esophageal stricture/dysphagia


-: bph


-: mastoidectomy


-: appendectomy


-: Sinus sx


Psychosocial/ Personal History: Patient lives at home with his wife and 2 

daughters and is currently working at [employer].





- Family History


  ** Mother


-: Heart disease, Hypertension





  ** Father


-: Heart disease, Hypertension, Stroke


Notes: gunshot





- Social History


Smoking Status: Unknown if ever smoked


Alcohol use: No


CD- Drugs: No


Caffeine use: No


Place of Residence: Home





Review of Systems


General: Unremarkable


Eyes: Unremarkable


ENT: Unremarkable


Respiratory: Cough, Shortness of Breath


Cardiovascular: Unremarkable


Gastrointestinal: Unremarkable


Genitourinary: Unremarkable


Musculoskeletal: Unremarkable


Integumentary: Unremarkable


Neurological: Other (Lightheadedness.)


Lymphatics: Unremarkable





Physical Examination





- Physical Exam


General: Alert, In no apparent distress, Oriented x3, Cooperative


HEENT: Atraumatic, PERRLA, Mucous membr. moist/pink, EOMI, Sclerae nonicteric


Neck: Supple, 2+ carotid pulse no bruit, No LAD, Without JVD or thyroid 

abnormality


Respiratory: Diminished, Inspiratory wheezes


Cardiovascular: No edema, Regular rate/rhythm, Normal S1 S2


Capillary refill: <2 Seconds


Gastrointestinal: Normal bowel sounds, Soft and benign, Non-distended, No 

tenderness


Musculoskeletal: No swelling, No tenderness


Integumentary: No rashes, No breakdown, No significant lesion


Neurological: Normal speech, Normal strength at 5/5 x4 extr, Normal tone, Normal

 affect


Lymphatics: No axilla or inguinal lymphadenopathy





- Studies


Laboratory Data (last 24 hrs)





06/06/23 14:48: PT 11.1, INR 1.01, APTT 32.0


06/06/23 14:48: WBC 14.10 H, Hgb 14.3, Hct 44.0, Plt Count 347


06/06/23 14:48: Sodium 137, Potassium 3.7, BUN 15, Creatinine 0.86, Glucose 106








Assessment and Plan





- Plan


--Acute on chronic COPD exacerbation.  Patient placed on steroids, neb treatment

 with albuterol\Atrovent.  Continue O2 therapy.


-- Acute on chronic respiratory failure with hypoxia.  Secondary to COPD 

exacerbation.  Continue current treatment regimen.


--Osteoarthritis.  We will manage pain with current pain medication regimen.


--Left leg pain.  Doppler Ultrasound  negative for DVT.  Continue current pain 

medication regimen. 


--Leukocytosis.  Likely steroid-induced.  Blood cultures and sputum cultures 

pending.  We will continue to monitor WBC.


--BPH.  Continue Flomax.


--DVT prophylaxis with Lovenox subQ.


Discharge Plan: Home


Plan to discharge in: Greater than 2 days





- Advance Directives


Does patient have a Living Will: No


Does patient have a Durable POA for Healthcare: No





- Code Status/Comfort Care


Code Status Assessed: Yes


Physician Review: Patient Assessed, Agree with Above Assessment and Plan


Critical Care: No

## 2023-06-06 NOTE — XMS REPORT
Continuity of Care Document

                             Created on:2023



Patient:FLASH AGUILERA

Sex:Male

:1960

External Reference #:[id number]





Demographics







                          Address                   [address] [place] [address]

 

                          Home Phone                [phone]

 

                          Work Phone                [phone]

 

                          Mobile Phone              [phone]

 

                          Email Address             [email or web address]

M

 

                          Preferred Language        English

 

                          Marital Status            Unknown

 

                          Gnosticism Affiliation     Unknown

 

                          Race                      Unknown

 

                          Additional Race(s)        Unavailable



                                                    White

 

                          Ethnic Group              Unknown









Author







                          Organization              [facility]

t

 

                          Address                   [address] [facility] [address] [place] [address]

 

                          Phone                     [phone]









Support







                Name            Relationship    Address         Phone

 

                Contact, No     Other           Unavailable     [phone]

 

                Rebeca Aguilera  Spouse          Unavailable     [phone]









Care Team Providers







                    Name                Role                Phone

 

                    Pcp, Patient Does Not Have A Primary Care Physician [phone]



 

                    Thais Wiggins RN Attending Clinician Unavailable

 

                    Speedy               Attending Clinician Unavailable

 

                    Irene Flores MD Attending Clinician [phone]

 

                    Jennifer Grace MD       Attending Clinician [phone]

 

                    Julián Faria CRNA       Attending Clinician [phone]

 

                    MARJ     Attending Clinician Unavailable

 

                    JAKOB EMERSON        Attending Clinician Unavailable

 

                    Madina Shen RN  Attending Clinician Unavailable

 

                    Lab, Adc Fam Pob I  Attending Clinician Unavailable

 

                    Antoinette Fung  Attending Clinician [phone]

 

                    ANTOINETTE OCASIO      Attending Clinician Unavailable

 

                    Speedy               Admitting Clinician Unavailable

 

                    IRENE FLORES      Admitting Clinician Unavailable

 

                    MARJ     Admitting Clinician Unavailable









Payers







           Payer Name Policy Type Policy Number Effective Date Expiration Date S

parish

 

           [place]            [id number] 2022 00:00:00       

     



           [place]                                               







Problems

This patient has no known problems.



Allergies, Adverse Reactions, Alerts







       Allergy Allergy Status Severity Reaction(s) Onset  Inactive Treating Comm

ents 

Source



       Name   Type                        Date   Date   Clinician        

 

       NO KNOWN Drug   Active                                           [facility]



       ALLERGIE Class                                                   ity of



       S                                                              [facility]







Family History







           Family Member Diagnosis  Comments   Start Date Stop Date  Source

 

           Natural mother Heart disease                                  [facility]

 

           Natural mother Hypertension                                  [facility]

 

           Natural father Heart disease                                  [facility]

 

           Natural father Hypertension                                  [facility]







Social History







           Social Habit Start Date Stop Date  Quantity   Comments   Source

 

           Gender identity 2022            Identifies as male            [facility]

[facility]



                      08:13:15              gender (finding)            [facility]

 

           Sexual orientation 2022            Heterosexual            [facility]

[facility]



                      08:13:15              (finding)             [facility]ospital

 

           History SDOH                                             [facility]



           Alcohol Std Drinks                                             [facility]

al

 

           History SDOH                                             [facility]



           Alcohol Binge                                             [facility]

 

           Exposure to                       Yes                   [facility]



           SARS-CoV-2 (event)                                             [facility]

 

           Alcohol intake 2022 Lifetime              [facility]



                      00:00:00   00:00:00   non-drinker            Hospital



                                            (finding)             

 

           History of Social 2022                       [facility]

st



           function   00:00:00   00:00:00                         Hospital

 

           Tobacco use and 2022 Smokeless tobacco            [facility]

[facility]



           exposure   00:00:00   00:00:00   non-user              Hospital

 

           Cigarettes smoked 2022                       [facility]

st



           current (pack per 00:00:00   00:00:00                         Hospita

l



           day) - Reported                                             

 

           Cigarette  2022                       [facility]



           pack-years 00:00:00   00:00:00                         Hospital

 

           History SDOH 2021 1                     [facility]



           Alcohol Frequency 00:00:00   00:00:00                         Hospita

l

 

           History of tobacco 1988 Cigarette Smoker            

[facility]



           use        00:00:00   00:00:00                         Hospital

 

           Sex Assigned At 1960 M                     [facility]



           Birth      00:00:00   00:00:00                         Hospital









                Smoking Status  Start Date      Stop Date       Source

 

                Tobacco smoking                                 [facility]

[place]



                consumption unknown                                 [facility]

[facility]

 

                Ex-smoker       2022 00:00:00 2022      [facility]

[facility]



                                                00:00:00        







Medications







       Ordered Filled Start  Stop   Current Ordering Indication Dosage Frequency

 Signature

                    Comments            Components          Source



     Medication Medication Date Date Medication? Clinician                (SIG) 

          



     Name Name                                                   

 

     tiotropium      2022- No             18ug      Place 18           Me

thodi



     (SPIRIVA)                                mcg into           st



     18 mcg per      14:50: 00:00                          inhaler           Hos

anju



     inhalation      38   :00                           and            l



     capsule                                         inhale.           

 

     tiotropium      2022- No             18ug      Place 18           Me

thodi



     (SPIRIVA)      -                          mcg into           st



     18 mcg per      14:50: 00:00                          inhaler           Hos

anju



     inhalation      38   :00                           and            l



     capsule                                         inhale.           

 

     tiotropium      -0 - No             18ug      Place 18           Me

thodi



     (SPIRIVA)                                mcg into           st



     18 mcg per      14:50: 00:00                          inhaler           Hos

anju



     inhalation      38   :00                           and            l



     capsule                                         inhale.           

 

     ciprofloxac      -0 - No             500mg Q.5D Take 1           Me

thodi



     in (CIPRO)                                tablet           st



     500 MG      00:00: 00:00                          (500 mg           Hospita



     tablet      00   :00                           total) by           l



                                                  mouth 2           



                                                  (two)           



                                                  times a           



                                                  day.           

 

     ciprofloxac      -0 - No             500mg Q.5D Take 1           Me

thodi



     in (CIPRO)                                tablet           st



     500 MG      00:00: 00:00                          (500 mg           Hospita



     tablet      00   :00                           total) by           l



                                                  mouth 2           



                                                  (two)           



                                                  times a           



                                                  day.           

 

     ciprofloxac      -0 - No             500mg Q.5D Take 1           Me

thodi



     in (CIPRO)                                tablet           st



     500 MG      00:00: 00:00                          (500 mg           Hospita



     tablet      00   :00                           total) by           l



                                                  mouth 2           



                                                  (two)           



                                                  times a           



                                                  day.           

 

     cholecalcif      2022-0      Yes            2000U QD   Take 1           Met

hodi



     mariah,      1-04                               capsule           st



     vitamin D3,      00:00:                               (2,000           Hosp

tina



     50 mcg      00                                 Units           l



     (2,000                                         total) by           



     unit)                                         mouth           



     capsule                                         daily.           



     capsule                                                        

 

     cholecalcif      2022-0      Yes            2000U QD   Take 1           Met

hodi



     mariah,      1-04                               capsule           st



     vitamin D3,      00:00:                               (2,000           Hosp

tina



     50 mcg      00                                 Units           l



     (2,000                                         total) by           



     unit)                                         mouth           



     capsule                                         daily.           



     capsule                                                        

 

     cholecalcif      2022-0      Yes            2000U QD   Take 1           Met

hodi



     mariah,      1-04                               capsule           st



     vitamin D3,      00:00:                               (2,000           Hosp

tina



     50 mcg      00                                 Units           l



     (2,000                                         total) by           



     unit)                                         mouth           



     capsule                                         daily.           



     capsule                                                        

 

     ascorbic      -0 3- No             1000mg QD   Take 1           [facility]

que



     acid,                                tablet           st



     vitamin C,      00:00: 05:59                          (1,000 mg           H

ospita



     (VITAMIN C)      00   :00                           total) by           l



     1000 MG                                         mouth           



     tablet                                         daily.           

 

     ascorbic      2023- No             1000mg QD   Take 1           [facility]

que



     acid,                                tablet           st



     vitamin C,      00:00: 05:59                          (1,000 mg           H

ospita



     (VITAMIN C)      00   :00                           total) by           l



     1000 MG                                         mouth           



     tablet                                         daily.           

 

     ascorbic      2023- No             1000mg QD   Take 1           [facility]

que



     acid,                                tablet           st



     vitamin C,      00:00: 05:59                          (1,000 mg           H

ospita



     (VITAMIN C)      00   :00                           total) by           l



     1000 MG                                         mouth           



     tablet                                         daily.           

 

     calcium      2022- No             2{tbl} Q.5D Take 2           [facility]

di



     carbonate-v      7-30 07-31                          tablets by           s

t



     itamin D3      00:00: 04:59                          mouth 2           Hosp

tina



     500 mg-200      00   :00                           (two)           l



     unit per                                         times a           



     tablet                                         day with           



                                                  meals.           

 

     calcium      -2022- No             2{tbl} Q.5D Take 2           [facility]

di



     carbonate-v      7-30 07-31                          tablets by           s

t



     itamin D3      00:00: 04:59                          mouth 2           Hosp

tina



     500 mg-200      00   :00                           (two)           l



     unit per                                         times a           



     tablet                                         day with           



                                                  meals.           

 

     calcium      -2022- No             2{tbl} Q.5D Take 2           [facility]

di



     carbonate-v      7-30 07-31                          tablets by           s

t



     itamin D3      00:00: 04:59                          mouth 2           Hosp

tina



     500 mg-200      00   :00                           (two)           l



     unit per                                         times a           



     tablet                                         day with           



                                                  meals.           

 

     methocarbam      -0 2- No             500mg      Take 500           

[facility]



     oL        6-18 09-01                          mg by           st



     (ROBAXIN)      00:00: 00:00                          mouth.           Hospi

ta



     500 MG      00   :00                                          l



     tablet                                                        

 

     methocarbam      -0 2- No             500mg      Take 500           

[facility]



     oL        6-18 09-01                          mg by           st



     (ROBAXIN)      00:00: 00:00                          mouth.           [facility]

ta



     500 MG      00   :00                                          l



     tablet                                                        

 

     methocarbam      -0 2022- No             500mg      Take 500           

[facility]



     oL        6-18 09-01                          mg by           st



     (ROBAXIN)      00:00: 00:00                          mouth.           [facility]

ta



     500 MG      00   :00                                          l



     tablet                                                        

 

     theophyllin      0      Yes            400mg      Take 400           M

ethodi



     e 400 mg 24      4-30                               mg by           st



     hr tablet      00:00:                               mouth.           Hospit

a



               00                                                l

 

     theophyllin      -0      Yes            400mg      Take 400           M

ethodi



     e 400 mg 24      4-30                               mg by           st



     hr tablet      00:00:                               mouth.           Hospit

a



               00                                                l

 

     theophyllin      -0      Yes            400mg      Take 400           M

ethodi



     e 400 mg 24      4-30                               mg by           st



     hr tablet      00:00:                               mouth.           Hospit

a



               00                                                l

 

     fluticasone      0      Yes                      INHALE 1           Me

thodi



     -umeclidin-      3-16                               PUFF ONCE           st



     vilanter      00:00:                               DAILY FOR           Hosp

tina



     (Trelegy      00                                 30 DAYS           l



     Ellipta)                                                        



     100-62.5-25                                                        



     mcg blister                                                        



     with device                                                        



     powder for                                                        



     inhalation                                                        

 

     fluticasone            Yes                      INHALE 1           Me

thodi



     -umeclidin-      3-16                               PUFF ONCE           st



     vilanter      00:00:                               DAILY FOR           Hosp

tina



     (Trelegy      00                                 30 DAYS           l



     Ellipta)                                                        



     100-62.5-25                                                        



     mcg blister                                                        



     with device                                                        



     powder for                                                        



     inhalation                                                        

 

     fluticasone            Yes                      INHALE 1           Me

thodi



     -umeclidin-      3-16                               PUFF ONCE           st



     vilanter      00:00:                               DAILY FOR           Hosp

tina



     (Trelegy      00                                 30 DAYS           l



     Ellipta)                                                        



     100-62.5-25                                                        



     mcg blister                                                        



     with device                                                        



     powder for                                                        



     inhalation                                                        

 

     predniSONE            Yes                      1 (one)           [facility]

que



     (DELTASONE)      6-17                               time each           st



     10 mg      00:00:                               day            Hospita



     tablet      00                                                l

 

     predniSONE            Yes                      1 (one)           Meth

que



     (DELTASONE)      6-17                               time each           st



     10 mg      00:00:                               day            Hospita



     tablet      00                                                l

 

     predniSONE            Yes                      1 (one)           M[facility]

que



     (DELTASONE)      6-17                               time each           st



     10 mg      00:00:                               day            Hospita



     tablet      00                                                l

 

     albuterol            Yes            2{puff} Q4H  2 puffs           Me

thodi



     (PROAIR      6-13                               every 4           st



     HFA) 90      00:00:                               (four)           Hospita



     mcg/actuati      00                                 hours as           l



     on inhaler                                         needed.           

 

     albuterol      2019-0      Yes            2{puff} Q4H  2 puffs           Me

thodi



     (PROAIR      6-13                               every 4           st



     HFA) 90      00:00:                               (four)           Hospita



     mcg/actuati      00                                 hours as           l



     on inhaler                                         needed.           

 

     albuterol      2019-0      Yes            2{puff} Q4H  2 puffs           Me

thodi



     (PROAIR      6-13                               every 4           st



     HFA) 90      00:00:                               (four)           Hospita



     mcg/actuati      00                                 hours as           l



     on inhaler                                         needed.           

 

     ipratropium      2019-0      Yes                 Q.25D Inhale 4           M

ethodi



     (ATROVENT)      6-12                               (four)           st



     0.02 %      00:00:                               times a           Hospita



     nebulizer      00                                 day.           l



     solution                                                        

 

     ipratropium      2019-0      Yes                 Q.25D Inhale 4           M

ethodi



     (ATROVENT)      6-12                               (four)           st



     0.02 %      00:00:                               times a           Hospita



     nebulizer      00                                 day.           l



     solution                                                        

 

     ipratropium      2019-0      Yes                 Q.25D Inhale 4           M

ethodi



     (ATROVENT)      6-12                               (four)           st



     0.02 %      00:00:                               times a           Hospita



     nebulizer      00                                 day.           l



     solution                                                        

 

     acetylcyste acetylcyste           No                       acetylcyst      

     [facility]



     ine 200 ine 200                                    eine 200           da



     mg/mL (20 mg/mL (20                                    mg/mL (20           

Medical



     %) solution %) solution                                    %)             G

roup



     USE 1 VIAL USE 1 VIAL                                    solution          

 



     BY   BY                                      USE 1 VIAL           



     NEBULIZER NEBULIZER                                    BY             



     EVERY 6 EVERY 6                                    NEBULIZER           



     HOURS AS HOURS AS                                    EVERY 6           



     NEEDED FOR NEEDED FOR                                    HOURS AS          

 



     7 DAYS 7 DAYS                                    NEEDED FOR           



                                                  7 DAYS           

 

     acetylcyste acetylcyste           No                       acetylcyst      

     [facility]



     ine powder ine powder                                    eine           da



                                                  powder           Medical



                                                                 Group

 

     albuterol albuterol           No                       albuterol           

[facility]



     sulfate HFA sulfate HFA                                    sulfate         

  da



     90   90                                      HFA 90           Medical



     mcg/actuati mcg/actuati                                    mcg/actuat      

     Group



     on aerosol on aerosol                                    ion            



     inhaler inhaler                                    aerosol           



     INHALE 2 INHALE 2                                    inhaler           



     PUFFS BY PUFFS BY                                    INHALE 2           



     MOUTH 4 MOUTH 4                                    PUFFS BY           



     TIMES DAILY TIMES DAILY                                    MOUTH 4         

  



     AS NEEDED AS NEEDED                                    TIMES           



                                                  DAILY AS           



                                                  NEEDED           

 

     ergocalcife ergocalcife           No                       ergocalcif      

     [facility]



     rol  rol                                     mariah           da



     (vitamin (vitamin                                    (vitamin           Med

ical



     D2) 1,250 D2) 1,250                                    D2) 1,250           

Group



     mcg (50,000 mcg (50,000                                    mcg            



     unit) unit)                                    (50,000           



     capsule capsule                                    unit)           



                                                  capsule           

 

     ipratropium ipratropium           No                       ipratropiu      

     [facility]



     0.5  0.5                                     m 0.5           da



     mg-albutero mg-albutero                                    mg-albuter      

     Medical



     l 3 mg (2.5 l 3 mg (2.5                                    ol 3 mg         

  Group



     mg base)/3 mg base)/3                                    (2.5 mg           



     mL   mL                                      base)/3 mL           



     nebulizatio nebulizatio                                    nebulizati      

     



     n soln USE n soln USE                                    on soln           



     1 VIAL IN 1 VIAL IN                                    USE 1 VIAL          

 



     NEBULIZER 4 NEBULIZER 4                                    IN             



     TIMES DAILY TIMES DAILY                                    NEBULIZER       

    



     AS NEEDED AS NEEDED                                    4 TIMES           



                                                  DAILY AS           



                                                  NEEDED           

 

     theophyllin theophyllin           No                       theophylli      

     [facility]



     e  mg e  mg                                    ne        

    da



     tablet,exte tablet,exte                                    mg             M

edical



     nded nded                                    tablet,ext           Group



     release,12 release,12                                    ended           



     hr TAKE 1 hr TAKE 1                                    release,12          

 



     TABLET BY TABLET BY                                    hr TAKE 1           



     MOUTH ONCE MOUTH ONCE                                    TABLET BY         

  



     DAILY DAILY                                    MOUTH ONCE           



                                                  DAILY           

 

     Trelegy Trelegy           No                       Trelegy           [facility]

r



     Ellipta 100 Ellipta 100                                    Ellipta         

  da



     mcg-62.5 mcg-62.5                                    100            Medical



     mcg-25 mcg mcg-25 mcg                                    mcg-62.5          

 Group



     powder for powder for                                    mcg-25 mcg        

   



     inhalation inhalation                                    powder for        

   



     INHALE 1 INHALE 1                                    inhalation           



     PUFF BY PUFF BY                                    INHALE 1           



     MOUTH ONCE MOUTH ONCE                                    PUFF BY           



     DAILY DAILY                                    MOUTH ONCE           



                                                  DAILY           

 

     zinc zinc           No                       zinc           [facility]



     sulfate 50 sulfate 50                                    sulfate 50        

   da



     mg zinc mg zinc                                    mg zinc           Medica

l



     (220 mg) (220 mg)                                    (220 mg)           [facility]

up



     capsule capsule                                    capsule           



     TAKE 1 TAKE 1                                    TAKE 1           



     CAPSULE BY CAPSULE BY                                    CAPSULE BY        

   



     MOUTH ONCE MOUTH ONCE                                    MOUTH ONCE        

   



     DAILY DAILY                                    DAILY           







Vital Signs







             Vital Name   Observation Time Observation Value Comments     Source

 

             BP Diastolic 2022 00:00:00 70 mm[Hg]                 [facility]

[facility]



                                                                 Group

 

             Height       2022 00:00:00 71 [in_i]                 [facility]

[facility]



                                                                 Group

 

             BMI (Body Mass 2022 00:00:00 22 kg/m2                  [facility]

[facility]



             Index)                                              Group

 

             BP Systolic  2022 00:00:00 121 mm[Hg]                [facility]

[facility]



                                                                 Group

 

             Body Weight  2022 00:00:00 158 [lb_av]               [facility]

[facility]



                                                                 Group

 

             BP Diastolic 2022 00:00:00 88 mm[Hg]                 [facility]

[facility]



                                                                 Group

 

             Height       2022 00:00:00 71 [in_i]                 [facility]

[facility]



                                                                 Group

 

             BMI (Body Mass 2022 00:00:00 22.1 kg/m2                [facility]

[facility]



             Index)                                              Group

 

             BP Systolic  2022 00:00:00 142 mm[Hg]                [facility]

[facility]



                                                                 Group

 

             Body Weight  2022 00:00:00 158.6 [lb_av]              [facility]

[facility]



                                                                 Group

 

             Systolic blood 2022 20:22:00 116 mm[Hg]                [facility]



             pressure                                            

 

             Diastolic blood 2022 20:22:00 59 mm[Hg]                 [facility]



             pressure                                            

 

             Heart rate   2022 20:22:00 80 /min                   [facility]

 

             Body temperature 2022 20:22:00 36.89 Rosi                 [facility]

 

             Respiratory rate 2022 20:22:00 22 /min                   [facility]

 

             Oxygen saturation in 2022 20:22:00 98 /min                   

[facility]



             Arterial blood by                                        



             Pulse oximetry                                        

 

             Body height  2022 15:01:00 180.3 cm                  [facility]

 

             Body weight  2022 15:01:00 71.4 kg                   [facility]

 

             BMI          2022 15:01:00 21.95 kg/m2               [facility]







Procedures







                Procedure       Date / Time     Performing Clinician Source



                                Performed                       

 

                ANAEROBIC CULTURE 2022 16:32:00 Flores Irene [facility]

 

                FUNGUS CULTURE  2022 16:32:00 [facility]

 

                AEROBIC CULTURE 2022 16:32:00 [facility]

 

                GRAM STAIN      2022 16:32:00 [facility]

 

                SD AN ELECTIVE  2022 16:06:00 Dario Schneider [facility]



                ENDOTRACHEAL AIRWAY                                 

 

                [facility]



                [facility]                                      

 

                SURGICAL PATHOLOGY 2022 13:55:00 [facility]

[facility]



                REQUEST                                         

 

                COVID-19 QUALITATIVE 2022 22:00:00 Irene Flores Met

[facility]



                RT-PCR                                          

 

                unlisted imaging order 2022 00:00:00                 [facility]







Plan of Care







             Planned Activity Planned Date Details      Comments     Source

 

             Future Scheduled 2023   COVID-19 VACCINE (#1)              [facility]



             Test         16:52:49     [code = COVID-19              



                                       VACCINE (#1)]              

 

             Future Scheduled 2023   Pneumococcal Vaccine:              [facility]



             Test         16:52:49     Pediatrics (0 to 5              



                                       Years) and At-Risk              



                                       Patients (6 to 64              



                                       Years) (1 - PCV) [code              



                                       = Pneumococcal              



                                       Vaccine: Pediatrics (0              



                                       to 5 Years) and              



                                       At-Risk Patients (6 to              



                                       64 Years) (1 - PCV)]              

 

             Future Scheduled 2023   Hepatitis C screening              [facility]



             Test         16:52:49     (procedure) [code =              



                                       912203984]                

 

             Future Scheduled 2023   COLONOSCOPY SCREENING              [facility]



             Test         16:52:49     [code = COLONOSCOPY              



                                       SCREENING]                

 

             Future Scheduled 2023   SHINGLES VACCINES (1              Met

[facility]



             Test         16:52:49     of 2) [code = SHINGLES              



                                       VACCINES (1 of 2)]              

 

             Future Scheduled 2023   INFLUENZA VACCINE              Method

[facility]



             Test         16:52:49     [code = INFLUENZA              



                                       VACCINE]                  

 

             Future Scheduled 2023   COVID-19 VACCINE (#1)              [facility]



             Test         16:52:49     [code = COVID-19              



                                       VACCINE (#1)]              

 

             Future Scheduled 2023   Pneumococcal Vaccine:              [facility]



             Test         16:52:49     Pediatrics (0 to 5              



                                       Years) and At-Risk              



                                       Patients (6 to 64              



                                       Years) (1 - PCV) [code              



                                       = Pneumococcal              



                                       Vaccine: Pediatrics (0              



                                       to 5 Years) and              



                                       At-Risk Patients (6 to              



                                       64 Years) (1 - PCV)]              

 

             Future Scheduled 2023   Hepatitis C screening              [facility]



             Test         16:52:49     (procedure) [code =              



                                       046729533]                

 

             Future Scheduled 2023   Screening for              [facility]



             Test         16:52:49     malignant neoplasm of              



                                       colon (procedure)              



                                       [code = 973086055]              

 

             Future Scheduled 2023   SHINGLES VACCINES (1              Met

[facility]



             Test         16:52:49     of 2) [code = SHINGLES              



                                       VACCINES (1 of 2)]              

 

             Future Scheduled 2023   INFLUENZA VACCINE              Method

[facility]



             Test         16:52:49     [code = INFLUENZA              



                                       VACCINE]                  

 

             Future Scheduled 2022   COVID-19 VACCINE (#1)              [facility]



             Test         21:07:58     [code = COVID-19              



                                       VACCINE (#1)]              

 

             Future Scheduled 2022   Pneumococcal Vaccine:              [facility]



             Test         21:07:58     Pediatrics (0 to 5              



                                       Years) and At-Risk              



                                       Patients (6 to 64              



                                       Years) (1 - PCV) [code              



                                       = Pneumococcal              



                                       Vaccine: Pediatrics (0              



                                       to 5 Years) and              



                                       At-Risk Patients (6 to              



                                       64 Years) (1 - PCV)]              

 

             Future Scheduled 2022   Hepatitis C screening              [facility]



             Test         21:07:58     (procedure) [code =              



                                       864958585]                

 

             Future Scheduled 2022   COLONOSCOPY SCREENING              [facility]



             Test         21:07:58     [code = COLONOSCOPY              



                                       SCREENING]                

 

             Future Scheduled 2022   SHINGLES VACCINES (1              Met

[facility]



             Test         21:07:58     of 2) [code = SHINGLES              



                                       VACCINES (1 of 2)]              

 

             Future Scheduled 2022   INFLUENZA VACCINE              Method

[facility]



             Test         21:07:58     [code = INFLUENZA              



                                       VACCINE]                  







Encounters







        Start   End     Encounter Admission Attending Care    Care    Encounter 

Source



        Date/Time Date/Time Type    Type    Clinicians Facility Department ID   

   

 

        2023         Outpatient                 [facility]  [id number]

 Common



        15:16:02                                                 [id number]   [facility]

 

        2023         Outpatient                 [facility]  [id number]

 Common



        14:16:02                                                 [id number]   [facility]

 

        2023         Outpatient                 [facility]  [id number]

 Common



        13:46:01                                                 [id number]   [facility]

 

        2023-02-15 2023-02-15 Nurse           DEB Wiggins    [id number]

[address] [facility]



        00:00:00 00:00:00 Triage          Thais DIXON   [id number]        

 [facility] [id number]         Dylan

as



                                                        [phone]         Medi

hussain



                                                        019             Branch

 

        2023 Outpatient         Yan_W   MM     MMG     [id number]

023 [facility]



        00:00:00 00:00:00                                         0101    da



                                                                        Medical



                                                                        Group

 

        2022-10-14 2022-10-14 Outpatient         Yan_W   MM[facility]     [facility]     [id number]

022 [facility]



        00:00:00 00:00:00                                         1014    da



                                                                        Medical



                                                                        Group

 

        2022 Outpatient         Yan_W   MM[facility]     MM[facility]     [id number]

022 [facility]



        00:00:00 00:00:00                                         0911    



                                                                        Medical



                                                                        Group

 

        2022 [facility], 1.2.840.1 [id number]

[id number] Methodi



        08:50:00 23:59:00 Encounter         Irene Wiggins 55454.1.1         [address]   

  [address]



                                                3.430.2.7                 Hospit

a



                                                .3.391689                 l



                                                .8                      

 

        2022 [facility] 1.2.840.1 [id number]

[id number] Methodi



        08:50:00 23:59:00 Encounter         Irene Wiggins 91571.1.1         [address]   

  [address]



                                                3.430.2.7                 Hospit

a



                                                .3.216333                 l



                                                .8                      

 

        2022 [facility], 1.2.840.1 [id number]

1429 Methodi



        11:00:00 15:05:00                 Irene Wiggins 66876.1.1         [address]     

st



                                                3.430.2.7                 Hospit

a



                                                .3.541921                 l



                                                .8                      

 

        2022 [facility], 1.2.840.1 [id number]

1429 Methodi



        11:00:00 15:05:00                 Irene Wiggins 37778.1.1         [address]     

st



                                                3.430.2.7                 Hospit

a



                                                .3.296155                 l



                                                .8                      

 

        2022 Anesthesia         Jennifer Grace 1.2.840.1 [id number] 

[id number] 

Methodi



        10:53:00 13:57:00 Event           Julián Faria 75305.1.1         013     st



                                                3.430.2.7                 Hospit

a



                                                .3.295735                 l



                                                .8                      

 

        2022 Anesthesia         Jennifer Grace 1.2.840.1 [id number] 

[id number] 

Methodi



        10:53:00 13:57:00 Event           Julián Faria 61511.1.1         013     st



                                                3.430.2.7                 Hospit

a



                                                .3.638428                 l



                                                .8                      

 

        2022 Travel                  1.2.840.1 1.2.573.634 [id number]

[id number] Methodi



        00:00:00 00:00:00                         44721.1.1 350.1.13.43 801     

st



                                                3.430.2.7 0.2.7.3.698         Ho

spita



                                                .3.030162 084.8           l



                                                .8                      

 

        2022 Travel                  1.2.840.1 1.2.791.070 [id number]

[id number] Methodi



        00:00:00 00:00:00                         01174.1.1 350.1.13.43 801     

st



                                                3.430.2.7 0.2.7.3.698         Ho

spita



                                                .3.982566 084.8           l



                                                .8                      

 

        2022 Pre-Admiss         [place], 1.2.840.1 [id number]

[id number] Methodi



        14:00:00 15:00:00 charlie Wiggins 44785.1.1         825     

st



                        Testing                 3.430.2.7                 Hospit

a



                                                .3.727784                 l



                                                .8                      

 

        2022 Pre-Admiss         [place], 1.2.840.1 [id number]

[id number] Methodi



        14:00:00 15:00:00 charlie Wiggins 73455.1.1         825     

st



                        Testing                 3.430.2.7                 Hospit

a



                                                .3.121188                 l



                                                .8                      

 

        2022 Travel                  1.2.840.1 1.2.867.422 [id number]

[id number] Methodi



        00:00:00 00:00:00                         19288.1.1 350.1.13.43 234     

st



                                                3.430.2.7 0.2.7.3.698         Ho

spita



                                                .3.865511 084.8           l



                                                .8                      

 

        2022 Travel                  1.2.840.1 1.2.811.137 9114

[id number] Methodi



        00:00:00 00:00:00                         90067.1.1 350.1.13.43 234     

st



                                                3.430.2.7 0.2.7.3.698         Ho

spita



                                                .3.781065 084.8           l



                                                .8                      

 

        2022 Outpatient         Yan_W   [facility]     [id number]

022 [facility]



        00:00:00 00:00:00                                         0805    



                                                                        Medical



                                                                        [facility]

 

        2022 Outpatient         [place] [address]

 [facility]



        00:00:00 00:00:00                 TEOFILO                    0728    [facility]



                                                                        Program

 

        2022 Outpatient         Yan_W   [facility]     [id number]

022 [facility]



        05:23:00 05:23:00                                         0702    [facility]

 

        2022 Outpatient         Yan_W   [facility]     [id number]

[address] [place]



        04:47:00 04:47:00                                         0616    [facility]

 

        2022 KATHRYN Concepcion     [place] -    [id number]

6 [place]



        00:00:00 00:00:00 MD: Ramirez Humphreys         [facility],                         Network         Group



                        [address],                         [facility],                         Otolaryngol         



                        [place]                              [facility]         



                        [address]                                         



                        , Ph.                                           



                        [phone]                                         

 

        2022-06-10 2022-06-10 Outpatient         Yan_W   [facility]     [id number]

[address] [place]



        04:57:00 04:57:00                                         0610    [facility]

 

        2022 Outpatient         Yan_W   [facility]     [id number]

[address] [place]



        04:54:00 04:54:00                                         0602    [facility]

 

        2022 Outpatient         Yan_W   HUGOG     [facility]     [id number]

[address] [place]



        04:54:00 04:54:00                                         0601    [facility]

 

        2022 KATHRYN Concepcion     [place] -    [id number]

1 [place]



        00:00:00 00:00:00 MD: Ramirez Humphreys         [facility]                         Network         Group



                        [address],                         [facility],                         Otolaryngol         



                        [place]                              [facility]         



                        [address]                                         



                        , Ph.                                           



                        [phone]                                           



                        [phone]                                         

 

        2022 Outpatient         Yan_W   KATHRYN     [facility]     [id number]

[address] [place]



        03:05:00 03:05:00                                         0526    [facility]

 

        2021 Outpatient         SUN,    [facility]     [id number]

[address] [place]



        00:00:00 00:00:00                 JAKOB                 [address]

i



                                                                        st

 

        2021 Outpatient         SUN,    [facility]     [id number]

[address] [place]



        00:00:00 00:00:00                 JAKOB                 [address]     [address]

i



                                                                        st

 

        2021 Outpatient         SUN,    [facility]     [id number]

[address] [place]



        00:00:00 00:00:00                 JAKOB                 [address]     [address]

i



                                                                        st

 

        2021 Outpatient         SUN,    [facility]     [id number]

[address] [place]



        00:00:00 00:00:00                 JAKOB                 563     [address]

i



                                                                        st

 

        2020 DEB Sorto    [id number]

52 [facility]



        00:00:00 00:00:00 (Out)           Madina DIXON   350.1.13.10         [facility] 4.2.7.2.686         Dylan

as



                                                        [phone]         [address]

 

        2020 DEB Sorto    1.2.840.114 [id number]

52 



        00:00:00 00:00:00 (Out)           Madina DIXON   350.1.13.10         



                                                hospitals 4.2.7.2.686         



                                                        [phone]         



                                                        019             

 

        2020 Laboratory         Lab, [facility]    1.2.

840.114 [id number] 

Univers



        13:13:24 13:34:21 Only            Antoinette Ocasio  350.1.13.10    

     ity of



                                                [place] 4.2.7.2.686         Dylan

as



                                                [employer] [phone]         Me

dical



                                                [facility]     [address]



                                                Office                  



                                                Building                 



                                                One                     

 

        2020 Laboratory         Lab, [facility]    1.2.840.114 [id number]

[id number] 



        13:13:24 13:34:21 Only            [facility]  350.1.13.10         



                                                [place] 4.2.7.2.686         



                                                [employer]fess[facility] [phone]         



                                                nal     [address]             



                                                Office                  



                                                Building                 



                                                One                     

 

        2020 Outpatient R       ABIGAIL  [facility]    [id number]

[address] [facility]



        13:00:00 13:00:00                 ANTOINETTE lackey [facility]







Results







           Test Description Test Time  Test Comments Results    Result Comments 

Source









                    Fungus culture      2022-10-05 00:15:00 









                      Test Item  Value      Reference Range Interpretation Comme

nts









             Fungus culture isolate No growth after 4 weeks of                  

         Specimen InformationSpecimen



             (test code = 580-1) incubation.                            Source: 

FluidSpecimen Site:



                                                                 Sinus: Right Et

hmoid Sinus



[facility]05 00:15:00





             Test Item    Value        Reference Range Interpretation Comments

 

             Fungus culture No growth                              Specimen



             isolate (test after 4 weeks                           InformationSp

ecimen



             code = 580-1) of                                     Source: FluidS

pecimen



                          incubation.                            Site: Sinus: Ri

ght Ethmoid



                                                                 Sinus



[facility]22[facility] 00:15:00





             Test Item    Value        Reference Range Interpretation Comments

 

             Fungus culture No growth                              Specimen



             isolate (test after 4 weeks                           InformationSp

ecimen



             code = 580-1) of                                     Source: FluidS

pecimen



                          incubation.                            Site: Sinus: Ri

ght Ethmoid



                                                                 Sinus



[facility] [id number] 13:04:00





             Test Item    Value        Reference Range Interpretation Comments

 

             Anaerobic    No anaerobic                           Specimen



             culture isolate organisms                              InformationS

pecimen



             (test code = isolated.                              Source: FluidSp

ecimen



             92527-3)                                            Site: Sinus: Ri

ght



                                                                 Ethmoid Sinus



[facility] [id number] 13:04:00





             Test Item    Value        Reference Range Interpretation Comments

 

             Anaerobic    No anaerobic                           Specimen



             culture isolate organisms                              InformationS

pecimen



             (test code = isolated.                              Source: FluidSp

ecimen



             04861-1)                                            Site: Sinus: Ri

ght



                                                                 Ethmoid Sinus



[facility] [id number] 13:04:00





             Test Item    Value        Reference Range Interpretation Comments

 

             Anaerobic    No anaerobic                           Specimen



             culture isolate organisms                              InformationS

pecimen



             (test code = isolated.                              Source: FluidSp

ecimen



             49343-3)                                            Site: Sinus: Ri

ght



                                                                 Ethmoid Sinus



[facility] [id number] 19:19:20





             Test Item    Value        Reference Range Interpretation Comments

 

             Case number (test code = [id number]                           



             [id number])                                            

 

             Surgical pathology See link below for                           



             report (test code = PDF Lab Report                           



             2255)                                               

 

             Result status (test code This is Final Report                      

     



             = 4812809)   for [id number]                           



[facility] [id number] 19:19:20





             Test Item    Value        Reference Range Interpretation Comments

 

             Case number (test code = [id number]                           



             [id number])                                            

 

             Surgical pathology See link below for                           



             report (test code = PDF Lab Report                           



             2255)                                               

 

             Result status (test code This is Final Report                      

     



             = 2015191)   for [id number]                           



[facility] [id number] 19:19:20





             Test Item    Value        Reference Range Interpretation Comments

 

             Case number (test code = [id number]                           



             [id number])                                            

 

             Surgical pathology See link below for                           



             report (test code = PDF Lab Report                           



             2255)                                               

 

             Result status (test code This is Final Report                      

     



             = 6469542)   for [id number]                           



[facility] [id number] 01:46:00





             Test Item    Value        Reference    Interpretation Comments



                                       Range                     

 

             Aerobic culture Normal                    A            Specimen



             isolate (test code respiratory                            Informati

onSpecimen



             = 41617-5)   floraFew                               Source: FluidSp

ecimen



                                                                 Site: Sinus: Ri

ght



                                                                 Ethmoid Sinus

 

             Lab Interpretation Abnormal                               



             (test code =                                        



             14609-8)                                            



[facility] [id number] 01:46:00





             Test Item    Value        Reference    Interpretation Comments



                                       Range                     

 

             Aerobic culture Normal                    A            Specimen



             isolate (test code respiratory                            Informati

onSpecimen



             = 06997-5)   floraFew                               Source: FluidSp

ecimen



                                                                 Site: Sinus: Ri

ght



                                                                 Ethmoid Sinus

 

             Lab Interpretation Abnormal                               



             (test code =                                        



             85767-3)                                            



[facility] [id number] 01:46:00





             Test Item    Value        Reference    Interpretation Comments



                                       Range                     

 

             Aerobic culture Normal                    A            Specimen



             isolate (test code respiratory                            Informati

onSpecimen



             = 81081-3)   floraFew                               Source: [facility]



                                                                 Site: Sinus: Ri

ght



                                                                 Ethmoid Sinus

 

             Lab Interpretation Abnormal                               



             (test code =                                        



             13527-1)                                            



[facility] [id number] 14:52:00





             Test Item    Value        Reference Range Interpretation Comments

 

             Fungus smear No fungi                               Specimen



             (test code = observed.                              InformationSpec

imen Source:



             1443)                                               FluidSpecimen S

ite: Sinus:



                                                                 Right Ethmoid S

inus



[facility] [id number] 14:52:00





             Test Item    Value        Reference Range Interpretation Comments

 

             Fungus smear No fungi                               Specimen



             (test code = observed.                              InformationSpec

imen Source:



             1442)                                               FluidSpecimen S

ite: Sinus:



                                                                 Right Ethmoid S

inus



[facility] [id number] 14:52:00





             Test Item    Value        Reference Range Interpretation Comments

 

             Fungus smear No fungi                               Specimen



             (test code = observed.                              InformationSpec

imen Source:



             1449)                                               FluidSpecimen S

ite: Sinus:



                                                                 Right Ethmoid S

inus



[facility] [id number] 04:08:00





             Test Item    Value        Reference Range Interpretation Comments

 

             Gram stain   No WBC's or                            Specimen



             isolate (test organisms seen.                           Information

Specimen



             code = 1469)                                        Source: [facility]



                                                                 Site: Sinus: Ri

t



                                                                 Ethmoid Sinus



[facility] [id number] 04:08:00





             Test Item    Value        Reference Range Interpretation Comments

 

             Gram stain   No WBC's or                            Specimen



             isolate (test organisms seen.                           Information

Specimen



             code = 1469)                                        Source: [facility]



                                                                 Site: Sinus: Ri

ght



                                                                 Ethmoid Sinus



[facility] [id number] 04:08:00





             Test Item    Value        Reference Range Interpretation Comments

 

             Gram stain   No WBC's or                            Specimen



             isolate (test organisms seen.                           Information

Specimen



             code = 1469)                                        Source: [facility]



                                                                 Site: Sinus: Ri

ght



                                                                 Ethmoid Sinus



[facility] PyoffrubAVDJ-NyJ-7 (COVID-19) RNA [Presence] in Respiratory specimen 
by JUANI with probe [id number] 21:22:50





             Test Item    Value        Reference Range Interpretation Comments

 

             SARS-CoV-2 (COVID-19) RNA Not detected                           



             [Presence] in Respiratory                                        



             specimen by JUANI with probe                                        



             detection (test code = 68826-3)                                    

    

 

             Whether patient is employed in a Unknown                           

     



             healthcare setting (test code =                                    

    



             50865-0)                                            

 

             Whether the patient has symptoms Unknown                           

     



             related to condition of interest                                   

     



             (test code = 78301-6)                                        

 

             Whether the patient was Unknown                                



             hospitalized for condition of                                      

  



             interest (test code = 98453-1)                                     

   

 

             Whether the patient was admitted Unknown                           

     



             to intensive care unit (ICU) for                                   

     



             condition of interest (test code                                   

     



             = 03234-7)                                          

 

             Whether patient resides in a Unknown                               

 



             congregate care setting (test                                      

  



             code = 84834-5)                                        

 

             Pregnancy status (test code = Unknown                              

  



             11428-9)                                            

 

             Date and time of symptom onset Unknown                             

   



             (test code = 81356-2)                                        



RAMIREZ Druze [facility]

## 2023-06-07 LAB
BUN BLD-MCNC: 14 MG/DL (ref 7–18)
COHGB MFR BLDA: 0.6 % (ref 0–1.5)
GLUCOSE SERPLBLD-MCNC: 190 MG/DL (ref 74–106)
HCT VFR BLD CALC: 38.5 % (ref 39.6–49)
HDLC SERPL-MCNC: 59 MG/DL (ref 40–60)
LDLC SERPL CALC-MCNC: 78 MG/DL (ref ?–130)
LYMPHOCYTES # SPEC AUTO: 0.3 K/UL (ref 0.7–4.9)
MCV RBC: 87.1 FL (ref 80–100)
MORPHOLOGY BLD-IMP: (no result)
OXYHGB MFR BLDA: 93.8 % (ref 94–97)
PMV BLD: 8.6 FL (ref 7.6–11.3)
POTASSIUM SERPL-SCNC: 4.3 MEQ/L (ref 3.5–5.1)
RBC # BLD: 4.42 M/UL (ref 4.33–5.43)
SAO2 % BLDA: 95.6 % (ref 92–98.5)

## 2023-06-07 RX ADMIN — IPRATROPIUM BROMIDE SCH MG: 0.5 SOLUTION RESPIRATORY (INHALATION) at 13:50

## 2023-06-07 RX ADMIN — Medication SCH ML: at 21:20

## 2023-06-07 RX ADMIN — ALBUTEROL SULFATE SCH MG: 2.5 SOLUTION RESPIRATORY (INHALATION) at 02:13

## 2023-06-07 RX ADMIN — IPRATROPIUM BROMIDE SCH MG: 0.5 SOLUTION RESPIRATORY (INHALATION) at 07:50

## 2023-06-07 RX ADMIN — SODIUM CHLORIDE SCH MLS: 9 INJECTION, SOLUTION INTRAVENOUS at 09:22

## 2023-06-07 RX ADMIN — Medication SCH ML: at 09:19

## 2023-06-07 RX ADMIN — Medication SCH: at 09:00

## 2023-06-07 RX ADMIN — ASPIRIN 81 MG SCH MG: 81 TABLET ORAL at 09:18

## 2023-06-07 RX ADMIN — ROFLUMILAST SCH MCG: 500 TABLET ORAL at 09:23

## 2023-06-07 RX ADMIN — ALBUTEROL SULFATE SCH MG: 2.5 SOLUTION RESPIRATORY (INHALATION) at 13:50

## 2023-06-07 RX ADMIN — ALBUTEROL SULFATE SCH MG: 2.5 SOLUTION RESPIRATORY (INHALATION) at 07:50

## 2023-06-07 RX ADMIN — IPRATROPIUM BROMIDE SCH MG: 0.5 SOLUTION RESPIRATORY (INHALATION) at 02:13

## 2023-06-07 RX ADMIN — SODIUM CHLORIDE SCH MLS: 9 INJECTION, SOLUTION INTRAVENOUS at 21:20

## 2023-06-07 RX ADMIN — ENOXAPARIN SODIUM SCH: 40 INJECTION SUBCUTANEOUS at 09:00

## 2023-06-07 RX ADMIN — ALBUTEROL SULFATE SCH MG: 2.5 SOLUTION RESPIRATORY (INHALATION) at 19:20

## 2023-06-07 RX ADMIN — IPRATROPIUM BROMIDE SCH MG: 0.5 SOLUTION RESPIRATORY (INHALATION) at 19:20

## 2023-06-07 RX ADMIN — TAMSULOSIN HYDROCHLORIDE SCH MG: 0.4 CAPSULE ORAL at 21:20

## 2023-06-07 NOTE — EKG
Test Date:    2023-06-06               Test Time:    15:28:07

Technician:   N[place]                                     

                                                     

MEASUREMENT RESULTS:                                       

Intervals:                                           

Rate:         105                                    

CA:           142                                    

QRSD:         122                                    

QT:           368                                    

QTc:          486                                    

Axis:                                                

P:            83                                     

CA:           142                                    

QRS:          127                                    

T:            76                                     

                                                     

INTERPRETIVE STATEMENTS:                                       

                                                     

Sinus tachycardia

Right bundle branch block

Left posterior fascicular block

*** Bifascicular block ***

Abnormal ECG

Compared to ECG 05/18/2023 15:29:57

Sinus rhythm no longer present

Bifascicular block still present



Electronically Signed On 06-07-23 14:32:29 CDT by Emmanuel Rios

## 2023-06-07 NOTE — P.PN
Date of Service: 06/07/23





Subjective:


Feeling better this morning 


breathing feels easier today 


some SOB with exertion 


otherwise no new / worsening problems 


was on IV cefepime at home via PICC for reportedly pseudomonas infxn





ROS: 


10 point ROS as noted above, otherwise negative





Physical Exam:


GEN: Alert, oriented, NAD


HEENT: Normal conjunctiva, sclera anicteric


CV: Regular rate and rhythm, no edema


Pulm: mildly labored respirations on 2L NC, diminished at bases b/l, Inspiratory

wheezes


ABD: Soft, nontender, nondistended


Neuro: Normal speech, normal affect





vitals reviewed





Problem List:





Acute on chronic COPD exacerbation


Acute on chronic respiratory failure with hypoxia


continue steroids, neb treatment with albuterol\Atrovent. 


states he uses 1.5L NC at home during the night 


previously treated with 2 weeks IV cefepime completed ~4 days ago for 

pseudomonas noted at OSH workup


last hospitalization, sputum grew Proteus Miralbilis Esbl , suspect resulting 

after patient left


CT chest (6/6):  No evidence of PE. Severe emphysema is present 


Continue O2 therapy


Pulmonology consulted 


   added merrem 6/7





Leukocytosis likely steroid-induced


Blood cultures pending 


sputum cultures pending





Osteoarthritis


continue current pain medication 





Left leg pain


Doppler Ultrasound negative for DVT.  


pain medication 





BPH 


Continue Flomax





VTE: Lovenox 


Code: Full


Dispo: Home 1-2 days 


will discuss with pulm, likely with PICC + merrem
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
## 2023-06-08 LAB
BUN BLD-MCNC: 13 MG/DL (ref 7–18)
GLUCOSE SERPLBLD-MCNC: 132 MG/DL (ref 74–106)
MAGNESIUM SERPL-MCNC: 2.1 MG/DL (ref 1.6–2.4)
POTASSIUM SERPL-SCNC: 4.3 MEQ/L (ref 3.5–5.1)

## 2023-06-08 PROCEDURE — 5A09357 ASSISTANCE WITH RESPIRATORY VENTILATION, LESS THAN 24 CONSECUTIVE HOURS, CONTINUOUS POSITIVE AIRWAY PRESSURE: ICD-10-PCS

## 2023-06-08 RX ADMIN — ROFLUMILAST SCH MCG: 500 TABLET ORAL at 08:59

## 2023-06-08 RX ADMIN — ALBUTEROL SULFATE SCH MG: 2.5 SOLUTION RESPIRATORY (INHALATION) at 08:40

## 2023-06-08 RX ADMIN — ENOXAPARIN SODIUM SCH: 40 INJECTION SUBCUTANEOUS at 08:59

## 2023-06-08 RX ADMIN — ALBUTEROL SULFATE SCH MG: 2.5 SOLUTION RESPIRATORY (INHALATION) at 01:15

## 2023-06-08 RX ADMIN — SODIUM CHLORIDE SCH MLS: 9 INJECTION, SOLUTION INTRAVENOUS at 09:00

## 2023-06-08 RX ADMIN — ALBUTEROL SULFATE SCH MG: 2.5 SOLUTION RESPIRATORY (INHALATION) at 19:55

## 2023-06-08 RX ADMIN — Medication SCH ML: at 09:00

## 2023-06-08 RX ADMIN — IPRATROPIUM BROMIDE SCH MG: 0.5 SOLUTION RESPIRATORY (INHALATION) at 14:50

## 2023-06-08 RX ADMIN — ALBUTEROL SULFATE SCH MG: 2.5 SOLUTION RESPIRATORY (INHALATION) at 14:50

## 2023-06-08 RX ADMIN — SODIUM CHLORIDE SCH MLS: 9 INJECTION, SOLUTION INTRAVENOUS at 21:07

## 2023-06-08 RX ADMIN — IPRATROPIUM BROMIDE SCH MG: 0.5 SOLUTION RESPIRATORY (INHALATION) at 01:15

## 2023-06-08 RX ADMIN — TAMSULOSIN HYDROCHLORIDE SCH MG: 0.4 CAPSULE ORAL at 21:07

## 2023-06-08 RX ADMIN — IPRATROPIUM BROMIDE SCH MG: 0.5 SOLUTION RESPIRATORY (INHALATION) at 08:40

## 2023-06-08 RX ADMIN — Medication SCH: at 09:00

## 2023-06-08 RX ADMIN — Medication SCH ML: at 21:07

## 2023-06-08 RX ADMIN — ASPIRIN 81 MG SCH MG: 81 TABLET ORAL at 08:59

## 2023-06-08 RX ADMIN — IPRATROPIUM BROMIDE SCH MG: 0.5 SOLUTION RESPIRATORY (INHALATION) at 19:55

## 2023-06-08 NOTE — P.PN
Subjective


Date of Service: 06/08/23


Chief Complaint: Resistant Pseudomonas and Proteus isolated


Subjective: Improving (Patient is improving still complaining of cough 

congestion)





Review of Systems


General: Weakness


Respiratory: Cough, Shortness of Breath





Physical Examination





- Vital Signs


Temperature: 98.3 F


Blood Pressure: 96/60


Pulse: 96


Respirations: 18


Pulse Ox (%): 95





- Physical Exam


General: Alert, Oriented x3


Respiratory: Diminished, Expiratory wheezes


Cardiovascular: No edema, Normal S1 S2





Assessment And Plan





- Current Problems (Diagnosis)


(1) COPD exacerbation


Current Visit: No   Status: Acute   


Plan: 


Patient is 63 years of age with terminal COPD admitted with an exacerbation he 

has a resistant Pseudomonas and Proteus isolated in the sputum assistant to 

cefepime continue with meropenem 1 g every 12 for at least 2 weeks IV patient 

will also qualify for a noninvasive ventilator chemistries reviewed white count 

is declined reduce dose of prednisone possible discharge tomorrow pending on the

culture results


Patient currently has stable COPD benefit from a noninvasive ventilator to 

reduce admissions to the hospital





Physician Review: Patient Assessed, Agree with Above Assessment and Plan

## 2023-06-08 NOTE — P.PN
Date of Service: 06/08/23





Subjective:


Feeling better today 


breathing improving, still with some shortness of breath, but closer to baseline


continues with wheeze


no new / worsening problems 


afebrile 





ROS: 


10 point ROS as noted above, otherwise negative





Physical Exam:


GEN: Alert, oriented, NAD


HEENT: Normal conjunctiva, sclera anicteric


CV: Regular rate and rhythm, no edema


Pulm: mildly labored respirations on 3L NC, diminished at bases b/l, diffuse 

mild Inspiratory wheezes


ABD: Soft, nontender, nondistended


Neuro: Normal speech, normal affect





vitals reviewed





Problem List:





Acute on chronic COPD exacerbation


Acute on chronic respiratory failure with hypoxia


continue steroids, neb treatment with albuterol\Atrovent. 


states he uses 1.5L NC at home during the night 


previously treated with 2 weeks IV cefepime completed ~4 days ago for 

pseudomonas noted at OSH workup


last hospitalization, sputum grew Proteus Miralbilis Esbl , suspect resulting 

after patient left


CT chest (6/6):  No evidence of PE. Severe emphysema is present


Continue O2 therapy





Blood cultures: NGTD 


sputum culture: growing 4+GNR





Pulmonology consulted 


   continue merrem for at least 2 weeks (6/7-6/21 tentatively)


   decreased prednisone 


Leukocytosis improved





Osteoarthritis


continue current pain medication 





Left leg pain


Doppler Ultrasound negative for DVT.  


pain medication 





BPH 


Continue Flomax





VTE: Lovenox 


Code: Full


Dispo: Home ~1 day


continue picc


pending set up with 2 weeks IV merrem, final sputum cultures

## 2023-06-09 VITALS — OXYGEN SATURATION: 95 %

## 2023-06-09 VITALS — TEMPERATURE: 97.8 F | DIASTOLIC BLOOD PRESSURE: 53 MMHG | SYSTOLIC BLOOD PRESSURE: 103 MMHG

## 2023-06-09 LAB
BUN BLD-MCNC: 11 MG/DL (ref 7–18)
GLUCOSE SERPLBLD-MCNC: 111 MG/DL (ref 74–106)
MAGNESIUM SERPL-MCNC: 2.2 MG/DL (ref 1.6–2.4)
POTASSIUM SERPL-SCNC: 4 MEQ/L (ref 3.5–5.1)

## 2023-06-09 RX ADMIN — ALBUTEROL SULFATE SCH MG: 2.5 SOLUTION RESPIRATORY (INHALATION) at 01:35

## 2023-06-09 RX ADMIN — ENOXAPARIN SODIUM SCH: 40 INJECTION SUBCUTANEOUS at 08:32

## 2023-06-09 RX ADMIN — ALBUTEROL SULFATE SCH MG: 2.5 SOLUTION RESPIRATORY (INHALATION) at 14:20

## 2023-06-09 RX ADMIN — Medication SCH: at 08:32

## 2023-06-09 RX ADMIN — ALBUTEROL SULFATE SCH MG: 2.5 SOLUTION RESPIRATORY (INHALATION) at 08:00

## 2023-06-09 RX ADMIN — IPRATROPIUM BROMIDE SCH MG: 0.5 SOLUTION RESPIRATORY (INHALATION) at 01:35

## 2023-06-09 RX ADMIN — IPRATROPIUM BROMIDE SCH MG: 0.5 SOLUTION RESPIRATORY (INHALATION) at 08:00

## 2023-06-09 RX ADMIN — IPRATROPIUM BROMIDE SCH MG: 0.5 SOLUTION RESPIRATORY (INHALATION) at 14:20

## 2023-06-09 RX ADMIN — SODIUM CHLORIDE SCH MLS: 9 INJECTION, SOLUTION INTRAVENOUS at 08:32

## 2023-06-09 RX ADMIN — ASPIRIN 81 MG SCH MG: 81 TABLET ORAL at 08:31

## 2023-06-09 RX ADMIN — ROFLUMILAST SCH MCG: 500 TABLET ORAL at 08:32

## 2023-06-09 RX ADMIN — Medication SCH ML: at 08:34

## 2023-06-09 NOTE — P.DS
Admission Date: 06/06/23


Discharge Date: 06/09/23


Disposition: DC HOME/HOME HEALTH CARE


Discharge Condition: GOOD


Reason for Admission: Resistant Pseudomonas and Proteus isolated


Consultations: 





Pulmonology - Dr. Ha





Brief History of Present Illness: 





62yo M, PMH: COPD, chronic respiratory failure on home O2 therapy, 

osteoarthritis 


Patient who presents with complaint of shortness of breath onset yesterday.  

Patient was recently discharged from the hospital after being treated for 

pneumonia and patient reported that he completed his IV antibiotics 4 days ago. 

Patient reported associated signs and symptoms of cough, lightheadedness and 

left leg pain.  Patient indicated that he experiences left leg pain at night, 

rated pain as 9/10 in severity and described pain as pressure\tightness in 

quality.  Patient denies any other signs and symptoms.  Symptoms are aggravated 

or relieved by nothing.  Patient decided to present to the hospital due to 

worsening symptoms.





Hospital Course: 





Problem List:


Acute on chronic COPD exacerbation


Acute on chronic respiratory failure with hypoxia


Osteoarthritis


Back pain 


Left leg pain


BPH 





Patient presented with increased shortness of breath and hypoxia after 

completing 2 weeks of IV antibiotics for pneumonia.


Review of prior cultures revealed Proteus growing in his sputum and was resistan

t to the cefepime he received (late result).


Pulmonology was consulted. He was started on Merrem and had improvement of his 

symptoms, remained afebrile.


Blood culture remained negative. Sputum culture grew multi-resistant 

Pseudomonas, sensitive to Merrem.


Merrem was set up for outpatient for 2 weeks total.





Follow up with PCP within 1 week


Follow up with Dr. Ha (Pulmonology) in ~1-2 weeks





On day of discharge, patient did develop acute worsening of chronic pain. He 

reported similar episodes, ~5/year, due to bone on bone arthritic spine changes 

and herniated disc.


He was restarted on his home muscle relaxers and oral pain medications provided 

relief. He felt improvement and was comfortable going home. May benefit from 

steroid injection / follow up with Ortho spine / pain clinic.





Meds on discharge:


Merrem x 2 weeks


norco refilled








Physical Exam:


GEN: Alert, oriented, NAD


HEENT: Normal conjunctiva, sclera anicteric


CV: Regular rate and rhythm, no edema


Pulm: nonlabored respirations on 2L NC, diminished at bases b/l, minimal 

Inspiratory wheezes


ABD: Soft, nontender, nondistended


MSK: b/l lumbar paraspinal muscle tenderness, mild. no tenderness on palpation 

of his spine


Neuro: Normal speech, normal affect





Vital Signs/Physical Exam: 














Temp Pulse Resp BP Pulse Ox


 


 97.7 F   85   16   105/63   96 


 


 06/09/23 12:00  06/09/23 12:00  06/09/23 12:00  06/09/23 12:00  06/09/23 12:00








Laboratory Data at Discharge: 














WBC  7.40 thou/uL (4.3-10.9)   06/07/23  04:00    


 


Hgb  12.7 g/dL (13.6-17.9)  L D 06/07/23  04:00    


 


Hct  38.5 % (39.6-49.0)  L  06/07/23  04:00    


 


Plt Count  276 thou/uL (152-406)   06/07/23  04:00    


 


PT  11.1 SECONDS (9.5-12.5)   06/06/23  14:48    


 


INR  1.01   06/06/23  14:48    


 


APTT  32.0 SECONDS (24.3-36.9)   06/06/23  14:48    


 


Sodium  137 mEq/L (136-145)   06/09/23  05:13    


 


Potassium  4.0 mEq/L (3.5-5.1)   06/09/23  05:13    


 


BUN  11 mg/dL (7-18)   06/09/23  05:13    


 


Creatinine  0.69 mg/dL (0.70-1.30)  L  06/09/23  05:13    


 


Glucose  111 mg/dL ()  H  06/09/23  05:13    


 


Phosphorus  4.0 mg/dL (2.5-4.9)   06/06/23  14:48    


 


Magnesium  2.2 mg/dL (1.6-2.4)   06/09/23  05:13    


 


Triglycerides  59 mg/dL (<150)   06/07/23  04:00    


 


Cholesterol  149 mg/dL (<200)   06/07/23  04:00    


 


HDL Cholesterol  59 mg/dL (40-60)   06/07/23  04:00    


 


Cholesterol/HDL Ratio  2.53   06/07/23  04:00    








Home Medications: 








Fluticasone/Umeclidin/Vilanter [Trelegy Ellipta 100-62.5-25] 1 puff IH DAILY 

01/04/21 


predniSONE [Deltasone*] 10 mg PO DAILY #30 tab 11/20/22 


Albuterol Inhaler [Ventolin Inhaler*] 1 puff IH PRN PRN 05/18/23 


Albuterol Neb [Proventil 0.083% Neb Soln] 2.5 mg IH Q6H 05/18/23 


Roflumilast [Daliresp*] 500 mcg PO DAILY 05/18/23 


Tamsulosin [Flomax*] 0.4 mg PO BEDTIME 05/18/23 


methocarbamoL [Methocarbamol] 2 tab PO Q8H 06/08/23 


Hydrocodone 10/APAP 325 [Norco 10/325*] 1 tab PO Q8H PRN #15 tab 06/09/23 


predniSONE [Deltasone*] 10 mg PO BID 5 Days #10 tab 06/09/23 





New Medications: 


predniSONE [Deltasone*] 10 mg PO BID 5 Days #10 tab


Hydrocodone 10/APAP 325 [Norco 10/325*] 1 tab PO Q8H PRN #15 tab


 PRN Reason: Pain Scale 5-7 (Moderate)


Physician Discharge Instructions: 


Patient presented with increased shortness of breath and hypoxia after 

completing 2 weeks of IV antibiotics for pneumonia.


Review of prior cultures revealed Proteus growing in his sputum and was 

resistant to the cefepime he received (late result).


Pulmonology was consulted. He was started on Merrem and had improvement of his 

symptoms, remained afebrile.


Blood culture remained negative. Sputum culture grew multi-resistant 

Pseudomonas, sensitive to Merrem.


Merrem was set up for outpatient for 2 weeks total.





Follow up with PCP within 1 week


Follow up with Dr. Ha (Pulmonology) in ~1-2 weeks





On day of discharge, patient did develop acute worsening of chronic pain. He 

reported similar episodes, ~5/year, due to bone on bone arthritic spine changes 

and herniated disc.


He was restarted on his home muscle relaxers and oral pain medications provided 

relief. He felt improvement and was comfortable going home. May benefit from 

steroid injection / follow up with Ortho spine / pain clinic.





Meds on discharge:


Merrem x 2 weeks


norco refilled








Followup: 


Casey Ha MD [Primary Care Provider] - 


Time spent managing pt's care (in minutes): 45

## 2023-06-09 NOTE — RAD REPORT
EXAM DESCRIPTION:  US - Renal Ultrasound-Complete - 6/9/2023 2:09 pm

 

CLINICAL HISTORY:  Abdominal pain/urinary retention/back pain

 

COMPARISON:  None

 

FINDINGS:  The right kidney measures 10 cm with a normal echotexture.

 

The left kidney measures 11 cm with a normal echotexture.

 

Hydronephrosis is not seen.

 

No gross abnormality of bladder

 

Moderate enlargement of a heterogeneous prostate

 

IMPRESSION:  Unremarkable renal ultrasound.

 

Moderate enlargement of a heterogeneous prostate

## 2023-06-09 NOTE — P.PN
Date of Service: 06/09/23





Subjective:


Feeling better today 


breathing ~same as yesterday; SOB with exertion; down from 3L to 2L NC today


back pain is more aggravated last night/today; given morphine overnight 


otherwise no new / worsening symptoms 


afebrile 





ROS: 


10 point ROS as noted above, otherwise negative





Physical Exam:


GEN: Alert, oriented, NAD


HEENT: Normal conjunctiva, sclera anicteric


CV: Regular rate and rhythm, no edema


Pulm: mildly labored respirations on 2L NC, diminished at bases b/l, diffuse 

mild Inspiratory wheezes


ABD: Soft, nontender, nondistended


Neuro: Normal speech, normal affect





vitals reviewed





Problem List:


Acute on chronic COPD exacerbation


Acute on chronic respiratory failure with hypoxia


Osteoarthritis


Back pain 


Left leg pain


BPH 





Acute on chronic COPD exacerbation


Acute on chronic respiratory failure with hypoxia


continue steroids, neb treatment with albuterol\Atrovent. 


states he uses 1.5L NC at home during the night 


previously treated with 2 weeks IV cefepime completed ~4 days ago for 

pseudomonas noted at OSH workup


last hospitalization, sputum grew Proteus Miralbilis Esbl , suspect resulting 

after patient left


CT chest (6/6):  No evidence of PE. Severe emphysema is present


Continue O2 therapy





Blood cultures: NGTD 


sputum culture: Pseudomonas Aeruginosa, 4+GNR


Pulmonology consulted 


   continue merrem for at least 2 weeks (6/7-6/21 tentatively)


   decreased prednisone 


Leukocytosis improved





Osteoarthritis


continue current pain medication 





Back pain 


states he has ~5 episodes of back pain a year 


treated with muscle relaxers and pain medication in the past and goes away on 

its own 


continue methocarbamol


6/9 - severe back pain aggravated overnight; worsened when 

turning/moving/standing 


given morphine x2 overnight 6/9


renal u/s ordered 6/9





Left leg pain


Doppler Ultrasound negative for DVT.  


pain medication 





BPH 


Continue Flomax


   states he takes 2 .4mg pills at night, increased 6/9 





VTE: Lovenox 


Code: Full


Dispo: Home ~1 day


continue picc


pending set up with 2 weeks IV merrem

## 2023-09-06 ENCOUNTER — HOSPITAL ENCOUNTER (INPATIENT)
Dept: HOSPITAL 97 - ER | Age: 63
LOS: 2 days | Discharge: HOME | DRG: 193 | End: 2023-09-08
Attending: HOSPITALIST | Admitting: HOSPITALIST
Payer: COMMERCIAL

## 2023-09-06 VITALS — BODY MASS INDEX: 30.2 KG/M2

## 2023-09-06 DIAGNOSIS — Z79.52: ICD-10-CM

## 2023-09-06 DIAGNOSIS — J96.21: ICD-10-CM

## 2023-09-06 DIAGNOSIS — Z99.81: ICD-10-CM

## 2023-09-06 DIAGNOSIS — J18.9: Primary | ICD-10-CM

## 2023-09-06 DIAGNOSIS — J43.9: ICD-10-CM

## 2023-09-06 DIAGNOSIS — M19.90: ICD-10-CM

## 2023-09-06 DIAGNOSIS — Z79.899: ICD-10-CM

## 2023-09-06 DIAGNOSIS — Z87.891: ICD-10-CM

## 2023-09-06 DIAGNOSIS — N40.0: ICD-10-CM

## 2023-09-06 LAB
ALBUMIN SERPL BCP-MCNC: 3.4 G/DL (ref 3.4–5)
ALP SERPL-CCNC: 65 U/L (ref 45–117)
ALT SERPL W P-5'-P-CCNC: 17 U/L (ref 16–61)
AST SERPL W P-5'-P-CCNC: 8 U/L (ref 15–37)
BILIRUB INDIRECT SERPL-MCNC: 0.5 MG/DL (ref 0.2–0.8)
BUN BLD-MCNC: 16 MG/DL (ref 7–18)
COHGB MFR BLDA: 0.9 % (ref 0–1.5)
GLUCOSE SERPLBLD-MCNC: 114 MG/DL (ref 74–106)
HCT VFR BLD CALC: 43.2 % (ref 39.6–49)
INR BLD: 1.01
LIPASE SERPL-CCNC: 11 U/L (ref 13–75)
LYMPHOCYTES # SPEC AUTO: 2.2 K/UL (ref 0.7–4.9)
MAGNESIUM SERPL-MCNC: 2.3 MG/DL (ref 1.6–2.4)
MCV RBC: 87.3 FL (ref 80–100)
NT-PROBNP SERPL-MCNC: 32 PG/ML (ref ?–125)
OXYHGB MFR BLDA: 96.2 % (ref 94–97)
PMV BLD: 8.3 FL (ref 7.6–11.3)
POTASSIUM SERPL-SCNC: 3.7 MEQ/L (ref 3.5–5.1)
RBC # BLD: 4.95 M/UL (ref 4.33–5.43)
SAO2 % BLDA: 98.7 % (ref 92–98.5)
TROPONIN I SERPL HS-MCNC: 6.4 PG/ML (ref ?–58.9)
WBC # BLD AUTO: 17.3 THOU/UL (ref 4.3–10.9)

## 2023-09-06 PROCEDURE — 94640 AIRWAY INHALATION TREATMENT: CPT

## 2023-09-06 PROCEDURE — 83605 ASSAY OF LACTIC ACID: CPT

## 2023-09-06 PROCEDURE — 84484 ASSAY OF TROPONIN QUANT: CPT

## 2023-09-06 PROCEDURE — 85730 THROMBOPLASTIN TIME PARTIAL: CPT

## 2023-09-06 PROCEDURE — 80076 HEPATIC FUNCTION PANEL: CPT

## 2023-09-06 PROCEDURE — 36600 WITHDRAWAL OF ARTERIAL BLOOD: CPT

## 2023-09-06 PROCEDURE — 71045 X-RAY EXAM CHEST 1 VIEW: CPT

## 2023-09-06 PROCEDURE — 82805 BLOOD GASES W/O2 SATURATION: CPT

## 2023-09-06 PROCEDURE — 85379 FIBRIN DEGRADATION QUANT: CPT

## 2023-09-06 PROCEDURE — 87205 SMEAR GRAM STAIN: CPT

## 2023-09-06 PROCEDURE — 85610 PROTHROMBIN TIME: CPT

## 2023-09-06 PROCEDURE — 96365 THER/PROPH/DIAG IV INF INIT: CPT

## 2023-09-06 PROCEDURE — 87040 BLOOD CULTURE FOR BACTERIA: CPT

## 2023-09-06 PROCEDURE — 96366 THER/PROPH/DIAG IV INF ADDON: CPT

## 2023-09-06 PROCEDURE — 96375 TX/PRO/DX INJ NEW DRUG ADDON: CPT

## 2023-09-06 PROCEDURE — 83735 ASSAY OF MAGNESIUM: CPT

## 2023-09-06 PROCEDURE — 93005 ELECTROCARDIOGRAM TRACING: CPT

## 2023-09-06 PROCEDURE — 94660 CPAP INITIATION&MGMT: CPT

## 2023-09-06 PROCEDURE — 36415 COLL VENOUS BLD VENIPUNCTURE: CPT

## 2023-09-06 PROCEDURE — 83880 ASSAY OF NATRIURETIC PEPTIDE: CPT

## 2023-09-06 PROCEDURE — 87077 CULTURE AEROBIC IDENTIFY: CPT

## 2023-09-06 PROCEDURE — 87070 CULTURE OTHR SPECIMN AEROBIC: CPT

## 2023-09-06 PROCEDURE — 96367 TX/PROPH/DG ADDL SEQ IV INF: CPT

## 2023-09-06 PROCEDURE — 82550 ASSAY OF CK (CPK): CPT

## 2023-09-06 PROCEDURE — 85025 COMPLETE CBC W/AUTO DIFF WBC: CPT

## 2023-09-06 PROCEDURE — 87186 SC STD MICRODIL/AGAR DIL: CPT

## 2023-09-06 PROCEDURE — 84100 ASSAY OF PHOSPHORUS: CPT

## 2023-09-06 PROCEDURE — 80048 BASIC METABOLIC PNL TOTAL CA: CPT

## 2023-09-06 PROCEDURE — 5A09457 ASSISTANCE WITH RESPIRATORY VENTILATION, 24-96 CONSECUTIVE HOURS, CONTINUOUS POSITIVE AIRWAY PRESSURE: ICD-10-PCS

## 2023-09-06 PROCEDURE — 83690 ASSAY OF LIPASE: CPT

## 2023-09-06 PROCEDURE — 80061 LIPID PANEL: CPT

## 2023-09-06 PROCEDURE — 99285 EMERGENCY DEPT VISIT HI MDM: CPT

## 2023-09-06 RX ADMIN — ENOXAPARIN SODIUM SCH: 40 INJECTION SUBCUTANEOUS at 18:00

## 2023-09-06 RX ADMIN — IPRATROPIUM BROMIDE SCH MG: 0.5 SOLUTION RESPIRATORY (INHALATION) at 20:55

## 2023-09-06 RX ADMIN — ALBUTEROL SULFATE SCH MG: 2.5 SOLUTION RESPIRATORY (INHALATION) at 20:55

## 2023-09-06 RX ADMIN — Medication SCH ML: at 21:09

## 2023-09-06 NOTE — P.HP
Certification for Inpatient


Patient admitted to: Inpatient


With expected LOS: >2 Midnights


Patient will require the following post-hospital care: None


Practitioner: I am a practitioner with admitting privileges, knowledge of 

patient current condition, hospital course, and medical plan of care.


Services: Services provided to patient in accordance with Admission requirements

found in Title 42 Section 412.3 of the Code of Federal Regulations





Patient History


Date of Service: 09/06/23


Reason for admission: Shortness of breath


History of Present Illness: 


Patient is a 63-year-old male with a past medical history significant for COPD, 

osteoarthritis, chronic respiratory failure who presents with complaint of 

shortness of breath and productive cough that has been ongoing for the past 2 

weeks.  Patient reported that he uses 4 L of O2 therapy during the day and 2 

L/min during the night.  Patient reported associated signs and symptoms of 

fatigue, weakness and low-grade fever.  Patient denies any other signs and 

symptoms.  Symptoms are aggravated by exertion and relieved by nothing.  Patient

decided to present to the hospital due to worsening symptoms.





Allergies





No Known Allergies Allergy (Verified 06/06/23 22:34)


   





Home Medications: 








Fluticasone/Umeclidin/Vilanter [Trelegy Ellipta 100-62.5-25] 1 puff IH DAILY 

01/04/21 


predniSONE [Deltasone*] 10 mg PO DAILY #30 tab 11/20/22 


Albuterol Inhaler [Ventolin Inhaler*] 1 puff IH PRN PRN 05/18/23 


Albuterol Neb [Proventil 0.083% Neb Soln] 2.5 mg IH Q6H 05/18/23 


Roflumilast [Daliresp*] 500 mcg PO DAILY 05/18/23 


Tamsulosin [Flomax*] 0.4 mg PO BEDTIME 05/18/23 


methocarbamoL [Methocarbamol] 2 tab PO Q8H 06/08/23 


Hydrocodone 10/APAP 325 [Norco 10/325*] 1 tab PO Q8H PRN #15 tab 06/09/23 


predniSONE [Deltasone*] 10 mg PO BID 5 Days #10 tab 06/09/23 








- Past Medical/Surgical History


Diabetic: No


-: COPD - emphysema on home oxygen and chronic steroids


-: History of tobacco abuse


-: esophageal stricture/dysphagia


-: bph


-: mastoidectomy


-: appendectomy


-: Sinus sx


Psychosocial/ Personal History: Patient lives at home with his wife and 2 

daughters and is currently working at [employer].





- Family History


  ** Mother


-: Heart disease, Hypertension





  ** Father


-: Heart disease, Hypertension, Stroke


Notes: gunshot





- Social History


Smoking Status: Former smoker


Alcohol use: No


CD- Drugs: No


Caffeine use: Yes


Place of Residence: Home





Review of Systems


General: Fever, Weakness, Other (Fatigue)


Eyes: Unremarkable


ENT: Unremarkable


Respiratory: Cough, Shortness of Breath, SOB with Excertion


Cardiovascular: Unremarkable


Gastrointestinal: Unremarkable


Genitourinary: Unremarkable


Musculoskeletal: Unremarkable


Integumentary: Unremarkable


Neurological: Unremarkable


Lymphatics: Unremarkable





Physical Examination





- Vital Signs


Pulse: 100


Pulse Ox (%): 97





- Physical Exam


General: Alert, Oriented x3, Cooperative, Mild distress


HEENT: Atraumatic, PERRLA, Mucous membr. moist/pink, EOMI, Sclerae nonicteric


Neck: Supple, 2+ carotid pulse no bruit, No LAD, Without JVD or thyroid 

abnormality


Respiratory: Diminished, Inspiratory wheezes


Cardiovascular: No edema, Regular rate/rhythm, Normal S1 S2


Capillary refill: <2 Seconds


Gastrointestinal: Normal bowel sounds, No tenderness


Musculoskeletal: No clubbing, No swelling, No tenderness


Integumentary: No rashes, No tenderness/swelling


Neurological: Normal speech, Normal tone, Normal affect


Lymphatics: No axilla or inguinal lymphadenopathy





- Studies


Laboratory Data (last 24 hrs)











  09/06/23 09/06/23 09/06/23





  15:25 15:25 15:25


 


WBC   17.30 H 


 


Hgb   14.1 


 


Hct   43.2 


 


Plt Count   389 


 


PT  11.1  


 


INR  1.01  


 


APTT  31.1  


 


Sodium    140


 


Potassium    3.7


 


BUN    16


 


Creatinine    0.77


 


Glucose    114 H


 


Magnesium    2.3


 


Total Bilirubin    0.6


 


AST    8 L


 


ALT    17


 


Alkaline Phosphatase    65


 


Lipase    11 L











Assessment and Plan





- Plan


--Pneumonia.  Chest x-ray indicates Moderate diffuse reticulonodular opacities 

probably an atypical infection.  Continue antibiotics, neb treatment with alb

uterol\Atrovent and O2 therapy.  Patient was on BiPAP therapy in the ER 

initially but now on O2 therapy at 4 L/min.  Continue supportive care.


--Acute on chronic COPD exacerbation.  Continue steroids and current treatment 

regimen


-- Acute on chronic respiratory failure.  Likely secondary to COPD exacerbation 

versus pneumonia.  Continue current treatment regimen.


-- Osteoarthritis.  We will manage pain on current pain medication regimen.


--BPH.  Continue Flomax.


--Leukocytosis.  Likely steroid-induced.  Blood cultures pending to rule out any

 bacteremia.  We will continue to monitor WBC levels.


-- DVT prophylaxis with Lovenox subQ.


Discharge Plan: Home


Plan to discharge in: Greater than 2 days





- Advance Directives


Does patient have a Durable POA for Healthcare: Yes





- Code Status/Comfort Care


Code Status Assessed: Yes


Physician Review: Patient Assessed, Agree with Above Assessment and Plan


Critical Care: No

## 2023-09-06 NOTE — ER
Nurse's Notes                                                                                     

 [facility]                                                                 

Name: Jaxon Taylor                                                                             

Age: 63 yrs                                                                                       

Sex: Male                                                                                         

: 1960                                                                                   

MRN: [id number]                                                                                   

Arrival Date: 2023                                                                          

Time: 14:08                                                                                       

Account#: [id number]                                                                            

Bed 20                                                                                            

Private MD:                                                                                       

Diagnosis: COPD exacerbation;Bilateral pneumonia;Hypoxia                                          

                                                                                                  

Presentation:                                                                                     

                                                                                             

14:21 Chief complaint: Patient states: short of breath for 2 weeks, green sputum for 2 wks    ko1 

      also. Increased home O2 to 6 lpm NC with no help. Coronavirus screen: At this time, the     

      client does not indicate any symptoms associated with coronavirus-19. Ebola Screen: No      

      symptoms or risks identified at this time. Initial Sepsis Screen:. Risk Assessment: Do      

      you want to hurt yourself or someone else? Patient reports no desire to harm self or        

      others. Onset of symptoms is unknown.                                                       

14:21 Method Of Arrival: Wheelchair                                                           ko1 

14:21 Acuity: FROILAN 2                                                                           ko1 

15:42 Initial Sepsis Screen: Does the patient meet any 2 criteria? RR > 20 per min. HR > 90   nj1 

      bpm. Yes Does the patient have a suspected source of infection? No. Patient's initial       

      sepsis screen is negative.                                                                  

                                                                                                  

Triage Assessment:                                                                                

14:23 General: Appears distressed, Behavior is cooperative, anxious. Pain: Complains of pain  ko1 

      in all over.                                                                                

                                                                                                  

Historical:                                                                                       

- Allergies:                                                                                      

14:23 No Known Allergies;                                                                     ko1 

- PMHx:                                                                                           

14:23 COPD; Emphysema; osteoarthritis;                                                        ko1 

                                                                                                  

- Immunization history:: Adult Immunizations unknown.                                             

- Social history:: Smoking status: unknown.                                                       

- Family history:: not pertinent.                                                                 

                                                                                                  

                                                                                                  

Screening:                                                                                        

15:30 [facility] ED Fall Risk Assessment (Adult) Score/Fall Risk Level 0 - 2 = Low Risk         nj1 

      Oriented to surroundings, Maintained a safe environment, Hourly rounding (assess needs      

      \T\ fall precautionary measures) done.                                                      

15:30 Abuse screen: Denies threats or abuse. Denies injuries from another. Nutritional        nj1 

      screening: No deficits noted. Tuberculosis screening: No symptoms or risk factors           

      identified.                                                                                 

                                                                                                  

Assessment:                                                                                       

15:07 Reassessment: Pt refusing IV access on right arm. RT in room attempting to obtain       nj1 

      ABG's, looking at right arm at this time. This RN to wait until RT is done performing       

      task.                                                                                       

15:30 General: Appears in no apparent distress. comfortable, Behavior is calm, cooperative,   nj1 

      appropriate for age. Pain: Denies pain. Neuro: Level of Consciousness is awake, alert,      

      obeys commands, Oriented to person, place, time, situation. Cardiovascular: Patient's       

      skin is warm and dry. Respiratory: Airway BiPAP in place Respiratory effort is even,        

      unlabored.                                                                                  

16:00 Reassessment: BiPAP alarming, patient has removed mask, this RN provides education,     nj1 

      patient does not want it back on. NC placed on at this time. Dr Garrett notified.        

16:50 Reassessment: Patient appears in no apparent distress at this time. Patient and/or      nj1 

      family updated on plan of care and expected duration. Pain level reassessed. Patient is     

      alert, oriented x 3, equal unlabored respirations, skin warm/dry/pink.                      

17:25 Reassessment: Patient appears in no apparent distress at this time. Patient and/or      nj1 

      family updated on plan of care and expected duration. Pain level reassessed. Patient is     

      alert, oriented x 3, equal unlabored respirations, skin warm/dry/pink. Patient states       

      symptoms have improved.                                                                     

19:12 Reassessment: Called phone number #[phone] as instructed by House Supervisor to give report   nj1 

      but had no answer. Will attempt again later.                                                

19:30 Reassessment: Patient appears in no apparent distress at this time. Patient and/or      nj1 

      family updated on plan of care and expected duration. Pain level reassessed. Patient is     

      alert, oriented x 3, equal unlabored respirations, skin warm/dry/pink. Pt requesting        

      lights out, wants to sleep. Patient states symptoms have improved.                          

                                                                                                  

Vital Signs:                                                                                      

14:21  / 78; Pulse 108; Resp 24; Temp 98.8; Pulse Ox 88% on 6 lpm NC;                   ko1 

15:38  / 82; Pulse 99; Resp 22; Pulse Ox 21% on BiPAP;                                  nj1 

16:49  / 98; Pulse 93; Resp 27; Pulse Ox 94% on 4 lpm NC;                               nj1 

17:25  / 82; Pulse 90; Resp 24; Pulse Ox 95% on 4 lpm NC;                               nj1 

19:19 Pulse 89; Resp 24; Pulse Ox 98% on 4 lpm NC;                                            nj1 

                                                                                                  

ED Course:                                                                                        

14:10 Patient arrived in ED.                                                                  im  

14:12 Srinath Garrett MD is Attending Physician.                                            rt  

14:23 Triage completed.                                                                       ko1 

14:23 Arm band placed on left wrist. Patient notified of wait time.                           ko1 

14:41 Arielle Nicholson, RN is Primary Nurse.                                                me1 

15:18 XRAY CXR (1 view) In Process Unspecified.                                               EDMS

15:25 Inserted saline lock: 22 gauge in left forearm, using aseptic technique. Blood          nj1 

      collected.                                                                                  

15:42 Patient has correct armband on for positive identification. Bed in low position. Call   nj1 

      light in reach. Adult w/ patient. Provided Education on: fall precautions, call light.      

16:41 Naina Brand MD is Hospitalizing Provider.                                           rt  

19:39 No provider procedures requiring assistance completed. Patient admitted, IV remains in  nj1 

      place.                                                                                      

                                                                                                  

Administered Medications:                                                                         

15:00 Drug: DuoNeb Nebulize (3:1) (2.5 mg - 0.5 mg) 3 ml Route: Nebulizer;                    nj1 

17:38 Follow up: Response: No adverse reaction                                                nj1 

15:30 Drug: MethylPrednisoLONE  mg Route: IVP; Site: left forearm;                     nj1 

17:38 Follow up: Response: No adverse reaction                                                nj1 

17:25 Drug: Cefepime IVPB 2 grams Route: IVPB; Rate: 200 ml/hr; Infused Over: 30 mins; Site:  nj 

      left forearm;                                                                               

18:04 Follow up: Response: No adverse reaction; IV Status: Completed infusion; IV Intake:     nj1 

      100ml                                                                                       

17:25 Drug: NS 0.9% IV 1000 ml Route: IV; Rate: 1 bolus; Site: left forearm;                  nj1 

20:08 Follow up: Response: No adverse reaction; IV Status: Completed infusion; IV Intake:     nj1 

      1000ml                                                                                      

18:05 Drug: vancoMYCIN IVPB 1 grams Route: IVPB; Infused Over: 2 hrs; Site: left forearm;     nj1 

20:08 Follow up: Response: No adverse reaction; IV Status: Completed infusion; IV Intake:     nj1 

      250ml                                                                                       

                                                                                                  

                                                                                                  

Medication:                                                                                       

19:40 VIS not applicable for this client.                                                     nj1 

                                                                                                  

Intake:                                                                                           

18:04 IV: 100ml; Total: 100ml.                                                                nj1 

20:08 IV: 1000ml; Total: 1100ml.                                                              nj1 

20:08 IV: 250ml; Total: 1350ml.                                                               nj1 

                                                                                                  

Outcome:                                                                                          

16:42 Decision to Hospitalize by Provider.                                                    rt  

19:39 Admitted to Tele accompanied by tech, via wheelchair, room 405, Report called to  Nurse idris Rodgers.                                                                                     

19:39 Condition: stable                                                                           

19:39 Instructed on the need for admit.                                                           

20:33 Patient left the ED.                                                                    nj1 

                                                                                                  

Signatures:                                                                                       

Dispatcher [employer]                           Martine Willard, RN                       RN   ko1                                                  

Srinath Garrett MD MD   rt                                                   

Lizzy Newton RN                         RN   nj1                                                  

Renetta Antonio Michelle RN                  RN   me1                                                  

                                                                                                  

Corrections: (The following items were deleted from the chart)                                    

19:36 17:25  / 82; Pulse 90bpm; Resp 24bpm; Pulse Ox 95% RA; nj1                        nj1 

                                                                                                  

**************************************************************************************************

## 2023-09-06 NOTE — RAD REPORT
EXAM DESCRIPTION:  Shahram Single View9/6/2023 3:16 pm

 

CLINICAL HISTORY:  Shortness of breath

 

COMPARISON:  June 2023

 

FINDINGS:

The lungs are markedly hyperaerated.

 

Moderate diffuse reticulonodular opacities are present within the lungs bilaterally

 

Heart is normal size

 

IMPRESSION:  COPD

 

Moderate diffuse reticulonodular opacities probably an atypical infection

## 2023-09-06 NOTE — XMS REPORT
Continuity of Care Document

                          Created on:2023



Patient:FLASH AGUILERA

Sex:Male

:1960

External Reference #:[id number]





Demographics







                          Address                   [address] [place] [address]

 

                          Home Phone                [phone]

 

                          Work Phone                [phone]

 

                          Mobile Phone              [phone]

 

                          Email Address             [email or web address]

M

 

                          Preferred Language        English

 

                          Marital Status            Unknown

 

                          Roman Catholic Affiliation     Unknown

 

                          Race                      Unknown

 

                          Additional Race(s)        Unavailable



                                                    White

 

                          Ethnic Group              Unknown









Author







                          Organization              [facility]

t

 

                          Address                   [address] [facility] [address] [place] [address]

 

                          Phone                     [phone]









Support







                Name            Relationship    Address         Phone

 

                Contact, No     Other           Unavailable     [phone]

 

                Rebeca Aguilera  Spouse          Unavailable     [phone]









Care Team Providers







                    Name                Role                Phone

 

                    Pcp, Patient Does Not Have A Primary Care Physician [phone]



 

                    Thais Wiggins RN Attending Clinician Unavailable

 

                    Speedy               Attending Clinician Unavailable

 

                    Irene Flores MD Attending Clinician [phone]

 

                    Jennifer Grace MD       Attending Clinician [phone]

 

                    Julián Faria CRNA       Attending Clinician [phone]

 

                    MARJ     Attending Clinician Unavailable

 

                    JAKOB EMERSON        Attending Clinician Unavailable

 

                    Madina Shen RN  Attending Clinician Unavailable

 

                    Lab, Adc Fam Pob I  Attending Clinician Unavailable

 

                    Antoinette Fung  Attending Clinician [phone]

 

                    ANTOINETTE OCASIO      Attending Clinician Unavailable

 

                    Speedy               Admitting Clinician Unavailable

 

                    IRENE FLORES      Admitting Clinician Unavailable

 

                    MARJ     Admitting Clinician Unavailable









Payers







           Payer Name Policy Type Policy Number Effective Date Expiration Date S

parish

 

           [place]            [id number] 2022 00:00:00       

     



           [place]                                               







Problems

This patient has no known problems.



Allergies, Adverse Reactions, Alerts







       Allergy Allergy Status Severity Reaction(s) Onset  Inactive Treating Comm

ents 

Source



       Name   Type                        Date   Date   Clinician        

 

       NO KNOWN Drug   Active                                           [facility]



       ALLERGIE Class                                                   ity of



       S                                                              [facility]







Family History







           Family Member Diagnosis  Comments   Start Date Stop Date  Source

 

           Natural father Heart disease                                  [facility]

 

           [facility] Hypertension                                  [facility]

 

           Natural mother Heart disease                                  [facility]

[facility]

 

           Natural mother Hypertension                                  [facility]







Social History







           Social Habit Start Date Stop Date  Quantity   Comments   Source

 

           Gender identity 2022            Identifies as male            [facility]

[facility]



                      08:13:15              gender (finding)            [facility]

 

           Sexual orientation 2022            Heterosexual            [facility]

[facility]



                      08:13:15              (finding)             Hospital

 

           History SDOH                                             [facility]



           Alcohol Std Drinks                                             Hospit

al

 

           History SDOH                                             [facility]



           Alcohol Binge                                             [facility]

 

           Exposure to                       Yes                   [facility]



           SARS-CoV-2 (event)                                             [facility]

 

           Alcohol intake 2022 Lifetime              [facility]



                      00:00:00   00:00:00   non-drinker            Hospital



                                            (finding)             

 

           History of Social 2022                       [facility]

[facility]



           function   00:00:00   00:00:00                         Hospital

 

           Tobacco use and 2022 Smokeless tobacco            [facility]

[facility]



           exposure   00:00:00   00:00:00   non-user              Hospital

 

           Cigarettes smoked 2022                       [facility]

st



           current (pack per 00:00:00   00:00:00                         Hospita

l



           day) - Reported                                             

 

           Cigarette  2022                       [facility]



           pack-years 00:00:00   00:00:00                         Hospital

 

           History SDOH 2021 1                     [facility]



           Alcohol Frequency 00:00:00   00:00:00                         Hospita

l

 

           History of tobacco 1988 Cigarette Smoker            

[facility]



           use        00:00:00   00:00:00                         Hospital

 

           Sex Assigned At 1960 M                     [facility]



           Birth      00:00:00   00:00:00                         Hospital









                Smoking Status  Start Date      Stop Date       Source

 

                Tobacco smoking                                 [facility]

[place]



                consumption unknown                                 [facility]

[facility]

 

                Ex-smoker       2022 00:00:00 2022      [facility]

[facility]



                                                00:00:00        







Medications







       Ordered Filled Start  Stop   Current Ordering Indication Dosage Frequency

 Signature

                    Comments            Components          Source



     Medication Medication Date Date Medication? Clinician                (SIG) 

          



     Name Name                                                   

 

     tiotropium      2022- No             18ug      Place 18           Me

thodi



     (SPIRIVA)                                mcg into           st



     18 mcg per      14:50: 00:00                          inhaler           Hos

anju



     inhalation      38   :00                           and            l



     capsule                                         inhale.           

 

     tiotropium      2022- No             18ug      Place 18           Me

thodi



     (SPIRIVA)      -                          mcg into           st



     18 mcg per      14:50: 00:00                          inhaler           Hos

anju



     inhalation      38   :00                           and            l



     capsule                                         inhale.           

 

     tiotropium      -0 2- No             18ug      Place 18           Me

thodi



     (SPIRIVA)                                mcg into           st



     18 mcg per      14:50: 00:00                          inhaler           Hos

anju



     inhalation      38   :00                           and            l



     capsule                                         inhale.           

 

     ciprofloxac      -0 2022- No             500mg Q.5D Take 1           Me

thodi



     in (CIPRO)                                tablet           st



     500 MG      00:00: 00:00                          (500 mg           Hospita



     tablet      00   :00                           total) by           l



                                                  mouth 2           



                                                  (two)           



                                                  times a           



                                                  day.           

 

     ciprofloxac      -0 2022- No             500mg Q.5D Take 1           Me

thodi



     in (CIPRO)                                tablet           st



     500 MG      00:00: 00:00                          (500 mg           Hospita



     tablet      00   :00                           total) by           l



                                                  mouth 2           



                                                  (two)           



                                                  times a           



                                                  day.           

 

     ciprofloxac      -0 2- No             500mg Q.5D Take 1           Me

thodi



     in (CIPRO)                                tablet           st



     500 MG      00:00: 00:00                          (500 mg           Hospita



     tablet      00   :00                           total) by           l



                                                  mouth 2           



                                                  (two)           



                                                  times a           



                                                  day.           

 

     ciprofloxac      2-0 2- No             500mg Q.5D Take 1           Me

thodi



     in (CIPRO)                                tablet           st



     500 MG      00:00: 00:00                          (500 mg           Hospita



     tablet      00   :00                           total) by           l



                                                  mouth 2           



                                                  (two)           



                                                  times a           



                                                  day.           

 

     cholecalcif      2022-0      Yes            2000U QD   Take 1           Met

hodi



     mariah,      1-04                               capsule           st



     vitamin D3,      00:00:                               (2,000           Hosp

tina



     50 mcg      00                                 Units           l



     (2,000                                         total) by           



     unit)                                         mouth           



     capsule                                         daily.           



     capsule                                                        

 

     cholecalcif      2022-0      Yes            2000U QD   Take 1           Met

hodi



     mariah,      1-04                               capsule           st



     vitamin D3,      00:00:                               (2,000           Hosp

tina



     50 mcg      00                                 Units           l



     (2,000                                         total) by           



     unit)                                         mouth           



     capsule                                         daily.           



     capsule                                                        

 

     cholecalcif      2022-0      Yes            2000U QD   Take 1           Met

hodi



     mariah,      1-04                               capsule           st



     vitamin D3,      00:00:                               (2,000           Hosp

tina



     50 mcg      00                                 Units           l



     (2,000                                         total) by           



     unit)                                         mouth           



     capsule                                         daily.           



     capsule                                                        

 

     cholecalcif      -0      Yes            2000U QD   Take 1           Met

hodi



     mariah,      1-04                               capsule           st



     vitamin D3,      00:00:                               (2,000           Hosp

tina



     50 mcg      00                                 Units           l



     (2,000                                         total) by           



     unit)                                         mouth           



     capsule                                         daily.           



     capsule                                                        

 

     ascorbic      2023- No             1000mg QD   Take 1           [facility]

que



     acid,                                tablet           st



     vitamin C,      00:00: 05:59                          (1,000 mg           H

ospita



     (VITAMIN C)      00   :00                           total) by           l



     1000 MG                                         mouth           



     tablet                                         daily.           

 

     ascorbic      2023- No             1000mg QD   Take 1           [facility]

que



     acid,                                tablet           st



     vitamin C,      00:00: 05:59                          (1,000 mg           H

ospita



     (VITAMIN C)      00   :00                           total) by           l



     1000 MG                                         mouth           



     tablet                                         daily.           

 

     ascorbic      2023- No             1000mg QD   Take 1           [facility]

que



     acid,                                tablet           st



     vitamin C,      00:00: 05:59                          (1,000 mg           H

ospita



     (VITAMIN C)      00   :00                           total) by           l



     1000 MG                                         mouth           



     tablet                                         daily.           

 

     ascorbic      2023- No             1000mg QD   Take 1           [facility]

que



     acid,                                tablet           st



     vitamin C,      00:00: 05:59                          (1,000 mg           H

ospita



     (VITAMIN C)      00   :00                           total) by           l



     1000 MG                                         mouth           



     tablet                                         daily.           

 

     calcium      2022- No             2{tbl} Q.5D Take 2           [facility]

di



     carbonate-v       07-31                          tablets by           s

t



     itamin D3      00:00: 04:59                          mouth 2           Hosp

tina



     500 mg-200      00   :00                           (two)           l



     unit per                                         times a           



     tablet                                         day with           



                                                  meals.           

 

     calcium      -2022- No             2{tbl} Q.5D Take 2           [facility]

di



     carbonate-v      7 07-31                          tablets by           s

t



     itamin D3      00:00: 04:59                          mouth 2           Hosp

tina



     500 mg-200      00   :00                           (two)           l



     unit per                                         times a           



     tablet                                         day with           



                                                  meals.           

 

     calcium      2022- No             2{tbl} Q.5D Take 2           [facility]

di



     carbonate-v      7-30 07-31                          tablets by           s

t



     itamin D3      00:00: 04:59                          mouth 2           Hosp

tina



     500 mg-200      00   :00                           (two)           l



     unit per                                         times a           



     tablet                                         day with           



                                                  meals.           

 

     methocarbam      -0 2- No             500mg      Take 500           

[facility]



     oL        6-18 09-01                          mg by           st



     (ROBAXIN)      00:00: 00:00                          mouth.           [facility]os[facility]

ta



     500 MG      00   :00                                          l



     tablet                                                        

 

     methocarbam      -0 2022- No             500mg      Take 500           

[facility]



     oL        6-18 09-01                          mg by           st



     (ROBAXIN)      00:00: 00:00                          mouth.           [facility]

ta



     500 MG      00   :00                                          l



     tablet                                                        

 

     methocarbam      -0 2022- No             500mg      Take 500           

[facility]



     oL        6-18 09-01                          mg by           st



     (ROBAXIN)      00:00: 00:00                          mouth.           [facility]

ta



     500 MG      00   :00                                          l



     tablet                                                        

 

     theophyllin      -0      Yes            400mg      Take 400           M

[facility]



     e 400 mg 24      4-30                               mg by           st



     hr tablet      00:00:                               mouth.           Hospit

a



               00                                                l

 

     theophyllin      -0      Yes            400mg      Take 400           M

ethodi



     e 400 mg 24      4-30                               mg by           st



     hr tablet      00:00:                               mouth.           Hospit

a



               00                                                l

 

     theophyllin      -0      Yes            400mg      Take 400           M

ethodi



     e 400 mg 24      4-30                               mg by           st



     hr tablet      00:00:                               mouth.           Hospit

a



               00                                                l

 

     theophyllin      -0      Yes            400mg      Take 400           M

ethodi



     e 400 mg 24      4-30                               mg by           st



     hr tablet      00:00:                               mouth.           Hospit

a



               00                                                l

 

     fluticasone      -0      Yes                      INHALE 1           Me

thodi



     -umeclidin-      3-16                               PUFF ONCE           st



     vilanter      00:00:                               DAILY FOR           Hosp

tina



     (Trelegy      00                                 30 DAYS           l



     Ellipta)                                                        



     100-62.5-25                                                        



     mcg blister                                                        



     with device                                                        



     powder for                                                        



     inhalation                                                        

 

     fluticasone      -0      Yes                      INHALE 1           Me

thodi



     -umeclidin-      3-16                               PUFF ONCE           st



     vilanter      00:00:                               DAILY FOR           Hosp

tina



     (Trelegy      00                                 30 DAYS           l



     Ellipta)                                                        



     100-62.5-25                                                        



     mcg blister                                                        



     with device                                                        



     powder for                                                        



     inhalation                                                        

 

     fluticasone            Yes                      INHALE 1           Me

thodi



     -umeclidin-      3-16                               PUFF ONCE           st



     vilanter      00:00:                               DAILY FOR           Hosp

tina



     (Trelegy      00                                 30 DAYS           l



     Ellipta)                                                        



     100-62.5-25                                                        



     mcg blister                                                        



     with device                                                        



     powder for                                                        



     inhalation                                                        

 

     fluticasone            Yes                      INHALE 1           Me

thodi



     -umeclidin-      3-16                               PUFF ONCE           st



     vilanter      00:00:                               DAILY FOR           Hosp

tina



     (Trelegy      00                                 30 DAYS           l



     Ellipta)                                                        



     100-62.5-25                                                        



     mcg blister                                                        



     with device                                                        



     powder for                                                        



     inhalation                                                        

 

     predniSONE            Yes                      1 (one)           [facility]

que



     (DELTASONE)      6-17                               time each           st



     10 mg      00:00:                               day            Hospita



     tablet      00                                                l

 

     predniSONE            Yes                      1 (one)           [facility]

que



     (DELTASONE)      6-17                               time each           st



     10 mg      00:00:                               day            Hospita



     tablet      00                                                l

 

     predniSONE            Yes                      1 (one)           [facility]

que



     (DELTASONE)      6-17                               time each           st



     10 mg      00:00:                               day            Hospita



     tablet      00                                                l

 

     predniSONE            Yes                      1 (one)           [facility]

que



     (DELTASONE)      6-17                               time each           st



     10 mg      00:00:                               day            Hospita



     tablet      00                                                l

 

     albuterol            Yes            2{puff} Q4H  2 puffs           Me

thodi



     (PROAIR      6-13                               every 4           st



     HFA) 90      00:00:                               (four)           Hospita



     mcg/actuati      00                                 hours as           l



     on inhaler                                         needed.           

 

     albuterol      0      Yes            2{puff} Q4H  2 puffs           Me

thodi



     (PROAIR      6-13                               every 4           st



     HFA) 90      00:00:                               (four)           Hospita



     mcg/actuati      00                                 hours as           l



     on inhaler                                         needed.           

 

     albuterol      0      Yes            2{puff} Q4H  2 puffs           Me

[facility]



     (PROAIR      6-13                               every 4           st



     HFA) 90      00:00:                               (four)           [facility]



     mcg/actuati      00                                 hours as           l



     on inhaler                                         needed.           

 

     albuterol      0      Yes            2{puff} Q4H  2 puffs           Me

thodi



     (PROAIR      6-13                               every 4           st



     HFA) 90      00:00:                               (four)           [facility]



     mcg/actuati      00                                 hours as           l



     on inhaler                                         needed.           

 

     ipratropium      2019-0      Yes                 Q.25D Inhale 4           M

ethodi



     (ATROVENT)      6-12                               (four)           st



     0.02 %      00:00:                               times a           [facility]



     nebulizer      00                                 day.           l



     solution                                                        

 

     ipratropium      2019-0      Yes                 Q.25D Inhale 4           M

ethodi



     (ATROVENT)      6-12                               (four)           st



     0.02 %      00:00:                               times a           [facility]



     nebulizer      00                                 day.           l



     solution                                                        

 

     ipratropium      2019-0      Yes                 Q.25D Inhale 4           M

ethodi



     (ATROVENT)      6-12                               (four)           st



     0.02 %      00:00:                               times a           [facility]



     nebulizer      00                                 day.           l



     solution                                                        

 

     ipratropium      2019-0      Yes                 Q.25D Inhale 4           M

ethodi



     (ATROVENT)      6-12                               (four)           st



     0.02 %      00:00:                               times a           [facility]



     nebulizer      00                                 day.           l



     solution                                                        

 

     acetylcyste acetylcyste           No                       acetylcyst      

     [facility]



     ine 200 ine 200                                    eine 200           da



     mg/mL (20 mg/mL (20                                    mg/mL (20           

Medical



     %) solution %) solution                                    %)             G

roup



     USE 1 VIAL USE 1 VIAL                                    solution          

 



     BY   BY                                      USE 1 VIAL           



     NEBULIZER NEBULIZER                                    BY             



     EVERY 6 EVERY 6                                    NEBULIZER           



     HOURS AS HOURS AS                                    EVERY 6           



     NEEDED FOR NEEDED FOR                                    HOURS AS          

 



     7 DAYS 7 DAYS                                    NEEDED FOR           



                                                  7 DAYS           

 

     acetylcyste acetylcyste           No                       acetylcyst      

     [facility]



     ine powder ine powder                                    eine           da



                                                  powder           Medical



                                                                 Group

 

     albuterol albuterol           No                       albuterol           

[facility]



     sulfate HFA sulfate HFA                                    sulfate         

  da



     90   90                                      HFA 90           Medical



     mcg/actuati mcg/actuati                                    mcg/actuat      

     Group



     on aerosol on aerosol                                    ion            



     inhaler inhaler                                    aerosol           



     INHALE 2 INHALE 2                                    inhaler           



     PUFFS BY PUFFS BY                                    INHALE 2           



     MOUTH 4 MOUTH 4                                    PUFFS BY           



     TIMES DAILY TIMES DAILY                                    MOUTH 4         

  



     AS NEEDED AS NEEDED                                    TIMES           



                                                  DAILY AS           



                                                  NEEDED           

 

     ergocalcife ergocalcife           No                       ergocalcif      

     [facility]



     rol  rol                                     mariah           da



     (vitamin (vitamin                                    (vitamin           Med

ical



     D2) 1,250 D2) 1,250                                    D2) 1,250           

Group



     mcg (50,000 mcg (50,000                                    mcg            



     unit) unit)                                    (50,000           



     capsule capsule                                    unit)           



                                                  capsule           

 

     ipratropium ipratropium           No                       ipratropiu      

     [facility]



     0.5  0.5                                     m 0.5           da



     mg-albutero mg-albutero                                    mg-albuter      

     Medical



     l 3 mg (2.5 l 3 mg (2.5                                    ol 3 mg         

  Group



     mg base)/3 mg base)/3                                    (2.5 mg           



     mL   mL                                      base)/3 mL           



     nebulizatio nebulizatio                                    nebulizati      

     



     n soln USE n soln USE                                    on soln           



     1 VIAL IN 1 VIAL IN                                    USE 1 VIAL          

 



     NEBULIZER 4 NEBULIZER 4                                    IN             



     TIMES DAILY TIMES DAILY                                    NEBULIZER       

    



     AS NEEDED AS NEEDED                                    4 TIMES           



                                                  DAILY AS           



                                                  NEEDED           

 

     theophyllin theophyllin           No                       theophylli      

     [facility]



     e  mg e  mg                                    ne        

    da



     tablet,exte tablet,exte                                    mg             M

edical



     nded nded                                    tablet,ext           Group



     release,12 release,12                                    ended           



     hr TAKE 1 hr TAKE 1                                    release,12          

 



     TABLET BY TABLET BY                                    hr TAKE 1           



     MOUTH ONCE MOUTH ONCE                                    TABLET BY         

  



     DAILY DAILY                                    MOUTH ONCE           



                                                  DAILY           

 

     Trelegy Trelegy           No                       Trelegy           [facility]

r



     Ellipta 100 Ellipta 100                                    Ellipta         

  da



     mcg-62.5 mcg-62.5                                    100            Medical



     mcg-25 mcg mcg-25 mcg                                    mcg-62.5          

 Group



     powder for powder for                                    mcg-25 mcg        

   



     inhalation inhalation                                    powder for        

   



     INHALE 1 INHALE 1                                    inhalation           



     PUFF BY PUFF BY                                    INHALE 1           



     MOUTH ONCE MOUTH ONCE                                    PUFF BY           



     DAILY DAILY                                    MOUTH ONCE           



                                                  DAILY           

 

     zinc zinc           No                       zinc           [facility]



     sulfate 50 sulfate 50                                    sulfate 50        

   da



     mg zinc mg zinc                                    mg zinc           [facility]

l



     (220 mg) (220 mg)                                    (220 mg)           [facility]

up



     capsule capsule                                    capsule           



     TAKE 1 TAKE 1                                    TAKE 1           



     CAPSULE BY CAPSULE BY                                    CAPSULE BY        

   



     MOUTH ONCE MOUTH ONCE                                    MOUTH ONCE        

   



     DAILY DAILY                                    DAILY           







Vital Signs







             Vital Name   Observation Time Observation Value Comments     Source

 

             BP Diastolic 2022 00:00:00 70 [facility]Hg[facility]                 [facility]

[facility]



                                                                 Group

 

             Height       2022 00:00:00 71 [in_i]                 [facility]



                                                                 [facility]

 

             BMI (Body Mass 2022 00:00:00 22 kg/m2                  [facility]

rd Medical



             Index)                                              Group

 

             BP Systolic  2022 00:00:00 121 mm[facility]Hg[facility]                [facility]

[facility]



                                                                 Group

 

             Body Weight  2022 00:00:00 158 [lb_av]               [facility]

[facility]



                                                                 Group

 

             BP Diastolic 2022 00:00:00 88 mm[Hg]                 [facility]

[facility]



                                                                 Group

 

             Height       2022 00:00:00 71 [in_i]                 [facility]

a [facility]



                                                                 Group

 

             BMI (Body Mass 2022 00:00:00 22.1 kg/m2                [facility]

rda Medical



             Index)                                              Group

 

             BP Systolic  2022 00:00:00 142 mm[Hg]                [facility]

[facility]



                                                                 Group

 

             Body Weight  2022 00:00:00 158.6 [lb_av]              [facility]

[facility]



                                                                 Group

 

             Systolic blood 2022 20:22:00 116 mm[Hg]                [facility]



             pressure                                            

 

             Diastolic blood 2022 20:22:00 59 mm[Hg]                 [facility]



             pressure                                            

 

             Heart rate   2022 20:22:00 80 /min                   [facility]

 

             Body temperature 2022 20:22:00 36.89 Rosi                 [facility]

 

             Respiratory rate 2022 20:22:00 22 /min                   [facility]

 

             Oxygen saturation in 2022 20:22:00 98 /min                   

[facility]



             Arterial blood by                                        



             Pulse oximetry                                        

 

             Body height  2022 15:01:00 180.3 cm                  [facility]

 

             Body weight  2022 15:01:00 71.4 kg                   [facility]

 

             BMI          2022 15:01:00 21.95 kg/m2               [facility]







Procedures







                Procedure       Date / Time     Performing Clinician Source



                                Performed                       

 

                ANAEROBIC CULTURE 2022 16:32:00 Irene Flores [facility]

 

                FUNGUS CULTURE  2022 16:32:00 [facility]

 

                [facility]

 

                GRAM STAIN      2022 16:32:00 [facility]

 

                WA AN ELECTIVE  2022 16:06:00 Dario Schneider [facility]



                ENDOTRACHEAL AIRWAY                                 

 

                SINUS SURG[facility]



                [facility]                                      

 

                SURGICAL PATHOLOGY 2022 13:55:00 Irene Flores [facility]



                REQUEST                                         

 

                COVID-19 QUALITATIVE 2022 22:00:00 Irene Flores [facility]



                RT-PCR                                          

 

                unlisted imaging order 2022 00:00:00                 [facility]



                                                                Group







Plan of Care







             Planned Activity Planned Date Details      Comments     Source

 

             Future Scheduled 2023   [facility]



             Test         21:42:11     malignant neoplasm of              



                                       colon (procedure)              



                                       [code = 937183909]              

 

             Future Scheduled 2023   [facility]ening [facility]



             Test         21:42:11     malignant neoplasm of              



                                       colon (procedure)              



                                       [code = 982213369]              

 

             Future Scheduled 2023   [facility]ening [facility]



             Test         21:42:11     malignant neoplasm of              



                                       colon (procedure)              



                                       [code = 839405803]              

 

             Future Scheduled 2023   COVID-19 VACCINE (#1)              [facility]



             Test         21:42:11     [code = COVID-19              



                                       VACCINE (#1)]              

 

             Future Scheduled 2023   Pneumococcal Vaccine:              [facility]



             Test         21:42:11     Pediatrics (0 to 5              



                                       Years) and At-Risk              



                                       Patients (6 to 64              



                                       Years) (1 - PCV) [code              



                                       = Pneumococcal              



                                       Vaccine: Pediatrics (0              



                                       to 5 Years) and              



                                       At-Risk Patients (6 to              



                                       64 Years) (1 - PCV)]              

 

             Future Scheduled 2023   Hepatitis C screening              [facility]



             Test         21:42:11     (procedure) [code =              



                                       939905788]                

 

             Future Scheduled 2023   [facility]ening [facility]              [facility]



             Test         21:42:11     malignant neoplasm of              



                                       colon (procedure)              



                                       [code = [id number]]              

 

             Future Scheduled 2023   [facility]ening [facility]



             Test         21:42:11     malignant neoplasm of              



                                       colon (procedure)              



                                       [code = [id number]]              

 

             Future Scheduled 2023   [facility]ING[facility]ACCINES (1              [facility]



             Test         21:42:11     of 2) [code = SHINGLES              



                                       VACCINES (1 of 2)]              

 

             Future Scheduled 2023   INFLUENZA VACCINE (#1)              [facility]



             Test         21:42:11     [code = INFLUENZA              



                                       VACCINE (#1)]              

 

             Future Scheduled 2023   COVID-19 VACCINE (#1)              [facility]



             Test         16:52:49     [code = COVID-19              



                                       VACCINE (#1)]              

 

             Future Scheduled 2023   Pneumococcal Vaccine:              [facility]



             Test         16:52:49     Pediatrics (0 to 5              



                                       Years) and At-Risk              



                                       Patients (6 to 64              



                                       Years) (1 - PCV) [code              



                                       = Pneumococcal              



                                       Vaccine: Pediatrics (0              



                                       to 5 Years) and              



                                       At-Risk Patients (6 to              



                                       64 Years) (1 - PCV)]              

 

             Future Scheduled 2023   Hepatitis C screening              [facility]



             Test         16:52:49     (procedure) [code =              



                                       [id number]]                

 

             Future Scheduled 2023   COLONOSCOPY SCREENING              [facility]



             Test         16:52:49     [code = COLONOSCOPY              



                                       SCREENING]                

 

             Future Scheduled 2023   [facility]ING[facility] VACCINES (1              [facility]

[facility]



             Test         16:52:49     of 2) [code = SHINGLES              



                                       VACCINES (1 of 2)]              

 

             Future Scheduled 2023   INFLU[facility]ZA VACCINE              [facility]



             Test         16:52:49     [code = INFLUENZA              



                                       VACCINE]                  

 

             Future Scheduled 2023   COVID-19 VACCINE (#1)              [facility]



             Test         16:52:49     [code = COVID-19              



                                       VACCINE (#1)]              

 

             Future Scheduled 2023   Pneumococcal Vaccine:              [facility]



             Test         16:52:49     Pediatrics (0 to 5              



                                       Years) and At-Risk              



                                       Patients (6 to 64              



                                       Years) (1 - PCV) [code              



                                       = Pneumococcal              



                                       Vaccine: Pediatrics (0              



                                       to 5 Years) and              



                                       At-Risk Patients (6 to              



                                       64 Years) (1 - PCV)]              

 

             Future Scheduled 2023   Hepatitis C screening              [facility]



             Test         16:52:49     (procedure) [code =              



                                       [id number]]                

 

             Future Scheduled 2023   [facility]ening [facility]



             Test         16:52:49     malignant neoplasm of              



                                       colon (procedure)              



                                       [code = [id number]]              

 

             Future Scheduled 2023   SHINGLES VACCINES (1              [facility]

[facility]



             Test         16:52:49     of 2) [code = SHINGLES              



                                       VACCINES (1 of 2)]              

 

             Future Scheduled 2023   INFLUENZA VACCINE              [facility]



             Test         16:52:49     [code = INFLUENZA              



                                       VACCINE]                  

 

             Future Scheduled 2022   COVID-19 VACCINE (#1)              [facility]



             Test         21:07:58     [code = COVID-19              



                                       VACCINE (#1)]              

 

             Future Scheduled 2022   Pneumococcal Vaccine:              [facility]



             Test         21:07:58     Pediatrics (0 to 5              



                                       Years) and At-Risk              



                                       Patients (6 to 64              



                                       Years) (1 - PCV) [code              



                                       = Pneumococcal              



                                       Vaccine: Pediatrics (0              



                                       to 5 Years) and              



                                       At-Risk Patients (6 to              



                                       64 Years) (1 - PCV)]              

 

             Future Scheduled 2022   Hepatitis C screening              [facility]



             Test         21:07:58     (procedure) [code =              



                                       [id number]]                

 

             Future Scheduled 2022   COLONOSCOPY SCREENING              [facility]



             Test         21:07:58     [code = COLONOSCOPY              



                                       SCREENING]                

 

             Future Scheduled 2022   SHINGLES VACCINES (1              [facility]

[facility]



             Test         21:07:58     of 2) [code = SHINGLES              



                                       VACCINES (1 of 2)]              

 

             Future Scheduled 2022   INFLUENZA VACCINE              [facility]

[facility]



             Test         21:07:58     [code = INFLUENZA              



                                       VACCINE]                  







Encounters







        Start   End     Encounter Admission Attending Care    Care    Encounter 

Source



        Date/Time Date/Time Type    Type    Clinicians Facility Department ID   

   

 

        [facility]  [id number]

 Common



        15:16:02                                                 [id number]   [facility]

 

        [facility]  [id number]

 Common



        14:16:02                                                 [id number]   [facility]

 

        2023         [facility]  [id number]

 Common



        13:46:01                                                 [id number]   [facility]

 

        2023-02-15 2023-02-15 Nurse           DEB Wiggins    [phone]

[address] [facility]



        00:00:00 00:00:00 Triage          Thais DIXON   350.1.13.10        

 [facility] 4.2.7.2.686         [place]

as



                                                        [phone]         Medi

hussain



                                                        019             Branch

 

        2023 Outpatient         Yan_W   [facility]     05055-3

023 [place]



        00:00:00 00:00:00                                         0101    da



                                                                        Medical



                                                                        Group

 

        2022-10-14 2022-10-14 Outpatient         Yan[facility]W   [facility]     23426-3

022 [place]



        00:00:00 00:00:00                                         1014    da



                                                                        Medical



                                                                        Group

 

        2022 Outpatient         Yan_W   [facility]     95774-2

022 [place]



        00:00:00 00:00:00                                         0911    da



                                                                        Medical



                                                                        Group

 

        2022 [facility] 1.2.840.1 [id number]

45082 Methodi



        08:50:00 23:59:00 Encounter         Irene Wiggins 43783.1.1         373   

  st



                                                3.430.2.7                 Hospit

a



                                                .3.818456                 l



                                                .8                      

 

        2022 [facility], 1.2.840.1 [id number]

[id number] Methodi



        08:50:00 23:59:00 Encounter         Irene Wiggins 83635.1.1         373   

  st



                                                3.430.2.7                 Hospit

a



                                                .3.209352                 l



                                                .8                      

 

        2022 Surgery         [place], 1.2.840.1 [id number]

1429 Methodi



        11:00:00 15:05:00                 Irene Wiggins 23169.1.1         217     

st



                                                3.430.2.7                 Hospit

a



                                                .3.699541                 l



                                                .8                      

 

        2022 [facility], 1.2.840.1 [id number]

1429 Methodi



        11:00:00 15:05:00                 Irene Wiggins 98664.1.1         217     

st



                                                3.430.2.7                 Hospit

a



                                                .3.363633                 l



                                                .8                      

 

        2022 Anesthesia         Jennifer Grace 1.2.840.1 [id number] 

[id number] 

Methodi



        10:53:00 13:57:00 Event           Julián Faria 24199.1.1         013     st



                                                3.430.2.7                 Hospit

a



                                                .3.713998                 l



                                                .8                      

 

        2022 Anesthesia         Jennifer Grace 1.2.840.1 [id number] 

[id number] 

Methodi



        10:53:00 13:57:00 Event           Julián Faria 34555.1.1         013     st



                                                3.430.2.7                 Hospit

a



                                                .3.425426                 l



                                                .8                      

 

        2022 Travel                  1.2.840.1 1.2.274.315 5670

120511 Methodi



        00:00:00 00:00:00                         94115.1.1 350.1.13.43 801     

st



                                                3.430.2.7 0.2.7.3.698         Ho

spita



                                                .3.274904 084.8           l



                                                .8                      

 

        2022 Travel                  1.2.840.1 1.2.034.119 1811

[id number] Methodi



        00:00:00 00:00:00                         15266.1.1 350.1.13.43 801     

st



                                                3.430.2.7 0.2.7.3.698         Ho

spita



                                                .3.982247 084.8           l



                                                .8                      

 

        2022 Pre-Admiss         Dereje, 1.2.840.1 [id number]

[id number] Methodi



        14:00:00 15:00:00 charlie Wiggins 38371.1.1         825     

st



                        Testing                 3.430.2.7                 Hospit

a



                                                .3.453229                 l



                                                .8                      

 

        2022 Travel                  1.2.840.1 1.2.722.298 5017

504219 Methodi



        00:00:00 00:00:00                         44715.1.1 350.1.13.43 234     

st



                                                3.430.2.7 0.2.7.3.698         Ho

spita



                                                .3.889326 084.8           l



                                                .8                      

 

        2022 Outpatient         Supa_W   [facility]     22740-6

022 [place]



        00:00:00 00:00:00                                         0805    da



                                                                        Medical



                                                                        Group

 

        2022 Outpatient         AMBRE[place] [address]

 [place]



        00:00:00 00:00:00                 REYNA                    07    [facility]



                                                                        Program

 

        2022 Outpatient         Supa_W   [facility]     22543-9

022 [place]



        05:23:00 05:23:00                                         0702    da



                                                                        Medical



                                                                        Group

 

        2022 Outpatient         Supa_W   [facility]     52301-2

022 [place]



        04:47:00 04:47:00                                         0616    



                                                                        Medical



                                                                        Group

 

        2022 KATHRYN Concepcion     [place] -    [id number]

6 [place]



        00:00:00 00:00:00 MD: Ramirez                         [facility],                         Network         Group



                        [address],                         [facility],                         Otol[place]         



                        [place]                              [facility] [place]         



                        [address]                                         



                        , Ph.                                           



                        ([phone])                                           



                        [phone]                                         

 

        2022-06-10 2022-06-10 Outpatient         Yan_W   MM[facility]     MMG     [id number]

022 [place]



        04:57:00 04:57:00                                         0610    [facility]

 

        2022 Outpatient         Yan_W   MMG     MMG     62552-1

022 [place]



        04:54:00 04:54:00                                         0602    [facility]

 

        2022 Outpatient         Yan_W   MMG     MMG     96698-2

022 [place]



        04:54:00 04:54:00                                         0601    [facility]

 

        2022 Luis Felipe Cowart                 MMG     [place] -    [id number]

1 [place]



        00:00:00 00:00:00 MD: Ramirez                         [facility]                         Network         Group



                        [address],                         [facility],                         Otolaryngol         



                        [place]                              angelineRISA         



                        [address]                                         



                        , Ph.                                           



                        ([phone])                                           



                        [phone]                                         

 

        2022 Outpatient         Yan_W   MMG     MMG     14824-3

022 [place]



        03:05:00 03:05:00                                         0526    [facility]

 

        2021 Outpatient         SUN,    [facility]     [id number]

[address] [place]



        00:00:00 00:00:00                 JAKOB                 861     Method

i



                                                                        

 

        2021 Outpatient         SUN,    [facility]     [id number]

[address]



        00:00:00 00:00:00                 JAKOB                 779     Method

i



                                                                        

 

        2021 Outpatient         SUN,    [facility]     [id number]

[address]



        00:00:00 00:00:00                 JAKOB                 103     Method

i



                                                                        

 

        2021 Outpatient         SUN,    [facility]     [id number]

[address]



        00:00:00 00:00:00                 JAKOB Simmons3     Method

i



                                                                        

 

        2020 Letter          DEB Shen    1.2.840.114 [id number]

52 [facility]



        00:00:00 00:00:00 (Out)           Madina DIXON   350.1.13.10         it

y of



                                                [facility] 4.2.7.2.686         [place]

as



                                                        [phone]         [address]

 

        2020 Letter          DEB Shen    1.2.840.114 [id number]

52 



        00:00:00 00:00:00 (Out)           Madina DIXON   350.1.13.10         



                                                [facility] 4.2.7.2.686         



                                                        [phone]         



                                                        [facility] [place][note 2]             

 

        2020 Laboratory         Lab, [facility]    1.2.

840.114 [id number] 

[facility]



        13:13:24 13:34:21 Only            Antoinette Ocasio  350.1.13.10    

     ity of



                                                [place] 4.2.7.2.686         Dylan

as



                                                Professio [phone]         Me

dical



                                                [address]



                                                Office                  



                                                Building                 



                                                One                     

 

        2020 Laboratory         Lab, [facility]    1.2.840.114 [id number]

[id number] 



        13:13:24 13:34:21 Only            [facility]  350.1.13.10         



                                                [place] 4.2.7.2.686         



                                                [place] [phone]         



                                                [address]                     

 

        2020 Outpatient R       ABIGAIL  [facility]    [id number]

757 [facility]



        13:00:00 13:00:00                 ANTOINETTE lackey [facility]







Results







           Test Description Test Time  Test Comments Results    Result Comments 

Source









                    Fungus culture      2022-10-05 00:15:00 









                      Test Item  Value      Reference Range Interpretation Comme

nts









             Fungus culture isolate No growth after 4 weeks of                  

         Specimen InformationSpecimen



             (test code = 580-1) incubation.                            Source: 

FluidSpecimen Site:



                                                                 Sinus: Right Et

hmoid Sinus



[facility]





             Test Item    Value        Reference Range Interpretation Comments

 

             Fungus culture No growth                              Specimen



             isolate (test after 4 weeks                           InformationSp

ecimen



             code = 580-1) of                                     Source: FluidS

pecimen



                          incubation.                            Site: Sinus: Ri

ght Ethmoid



                                                                 Sinus



[facility]





             Test Item    Value        Reference Range Interpretation Comments

 

             Fungus culture No growth                              Specimen



             isolate (test after 4 weeks                           InformationSp

ecimen



             code = 580-1) of                                     Source: FluidS

pecimen



                          incubation.                            Site: Sinus: Ri

t Ethmoid



                                                                 Sinus



[facility] culture2022-10-05 00:15:00





             Test Item    Value        Reference Range Interpretation Comments

 

             Fungus culture No growth                              Specimen



             isolate (test after 4 weeks                           InformationSp

ecimen



             code = 580-1) of                                     Source: FluidS

pecimen



                          incubation.                            Site: Sinus: Ri

t Ethmoid



                                                                 Sinus



[facility] [id number] 13:04:00





             Test Item    Value        Reference Range Interpretation Comments

 

             Anaerobic    No anaerobic                           Specimen



             culture isolate organisms                              InformationS

pecimen



             (test code = isolated.                              Source: FluidSp

ecimen



             10182-6)                                            Site: Sinus: Ri

[facility]



                                                                 Ethmoid Sinus



[facility] [id number] 13:04:00





             Test Item    Value        Reference Range Interpretation Comments

 

             Anaerobic    No anaerobic                           Specimen



             culture isolate organisms                              InformationS

pecimen



             (test code = isolated.                              Source: FluidSp

ecimen



             97607-7)                                            [facility]



                                                                 Ethmoid Sinus



[facility] [id number] 13:04:00





             Test Item    Value        Reference Range Interpretation Comments

 

             Anaerobic    No anaerobic                           Specimen



             culture isolate organisms                              InformationS

pecimen



             (test code = isolated.                              Source: FluidSp

ecimen



             24940-5)                                            Site: Sinus: [facility]



                                                                 Ethmoid Sinus



[facility] [id number] 13:04:00





             Test Item    Value        Reference Range Interpretation Comments

 

             Anaerobic    No anaerobic                           Specimen



             culture isolate organisms                              InformationS

pecimen



             (test code = isolated.                              Source: FluidSp

ecimen



             12950-1)                                            Site: Sinus: Ri

t



                                                                 Ethmoid Sinus



[facility] [id number] 19:19:20





             Test Item    Value        Reference Range Interpretation Comments

 

             Case number (test code = [id number]                           



             [id number])                                            

 

             Surgical pathology See link below for                           



             report (test code = PDF Lab Report                           



             2255)                                               

 

             Result status (test code This is Final Report                      

     



             = 5661163)   for [id number]                           



[facility] [id number] 19:19:20





             Test Item    Value        Reference Range Interpretation Comments

 

             Case number (test code = [id number]                           



             [id number])                                            

 

             Surgical pathology See link below for                           



             report (test code = PDF Lab Report                           



             2255)                                               

 

             Result status (test code This is Final Report                      

     



             = 1818295)   for [id number]                           



[facility] [id number] 19:19:20





             Test Item    Value        Reference Range Interpretation Comments

 

             Case number (test code = [id number]                           



             [id number])                                            

 

             Surgical pathology See link below for                           



             report (test code = PDF Lab Report                           



             2255)                                               

 

             Result status (test code This is Final Report                      

     



             = 8685466)   for [id number]                           



[facility] [id number] 19:19:20





             Test Item    Value        Reference Range Interpretation Comments

 

             Case number (test code = [id number]                           



             [id number])                                            

 

             Surgical pathology See link below for                           



             report (test code = PDF Lab Report                           



             2255)                                               

 

             Result status (test code This is Final Report                      

     



             = 2990427)   for [id number]                           



[facility] [id number] 01:46:00





             Test Item    Value        Reference    Interpretation Comments



                                       Range                     

 

             Aerobic culture Normal                    A            Specimen



             isolate (test code respiratory                            Informati

onSpecimen



             = 11927-2)   floraFew                               Source: [facility]

ecimen



                                                                 Site: Sinus: Ri

ght



                                                                 Ethmoid Sinus

 

             Lab Interpretation Abnormal                               



             (test code =                                        



             89160-9)                                            



[facility] [id number] 01:46:00





             Test Item    Value        Reference    Interpretation Comments



                                       Range                     

 

             Aerobic culture Normal                    A            Specimen



             isolate (test code respiratory                            Informati

onSpecimen



             = 82380-6)   floraFew                               Source: [facility]

ecimen



                                                                 Site: Sinus: Ri

ght



                                                                 Ethmoid Sinus

 

             Lab Interpretation Abnormal                               



             (test code =                                        



             53494-4)                                            



[facility] [id number] 01:46:00





             Test Item    Value        Reference    Interpretation Comments



                                       Range                     

 

             Aerobic culture Normal                    A            Specimen



             isolate (test code respiratory                            Informati

onSpecimen



             = 54919-3)   floraFew                               Source: FluidS

ecimen



                                                                 Site: Sinus: Ri

ght



                                                                 Ethmoid Sinus

 

             Lab Interpretation Abnormal                               



             (test code =                                        



             76281-7)                                            



[facility] [id number] 01:46:00





             Test Item    Value        Reference    Interpretation Comments



                                       Range                     

 

             Aerobic culture Normal                    A            Specimen



             isolate (test code respiratory                            Informati

onSpecimen



             = 06648-9)   floraFew                               Source: FluidS

ecimen



                                                                 Site: Sinus: Ri

ght



                                                                 Ethmoid Sinus

 

             Lab Interpretation Abnormal                               



             (test code =                                        



             77059-6)                                            



[facility] [id number] 14:52:00





             Test Item    Value        Reference Range Interpretation Comments

 

             Fungus smear No fungi                               Specimen



             (test code = observed.                              InformationSpec

imen Source:



             1443)                                               FluidSpecimen S

ite: Sinus:



                                                                 Right Ethmoid S

inus



[facility] [id number] 14:52:00





             Test Item    Value        Reference Range Interpretation Comments

 

             Fungus smear No fungi                               Specimen



             (test code = observed.                              InformationSpec

imen Source:



             1443)                                               FluidSpecimen S

ite: Sinus:



                                                                 Right Ethmoid S

inus



[facility] [id number] 14:52:00





             Test Item    Value        Reference Range Interpretation Comments

 

             Fungus smear No fungi                               Specimen



             (test code = observed.                              InformationSpec

imen Source:



             1443)                                               FluidSpecimen S

ite: Sinus:



                                                                 Right Ethmoid S

inus



[id number]





             Test Item    Value        Reference Range Interpretation Comments

 

             Fungus smear No fungi                               Specimen



             (test code = observed.                              InformationSpec

imen Source:



             1449)                                               FluidSpecimen S

ite: Sinus:



                                                                 Right Ethmoid S

inus



[facility] [id number] 04[facility]08:00





             Test Item    Value        Reference Range Interpretation Comments

 

             Gram stain   No WBC's or                            Specimen



             isolate (test organisms seen.                           Information

Specimen



             code = 1469)                                        Source: [facility]



                                                                 Site: Sinus: [facility]



                                                                 Ethmoid Sinus



[facility] [id number] 04:08:00





             Test Item    Value        Reference Range Interpretation Comments

 

             Gram stain   No WBC's or                            Specimen



             isolate (test organisms seen.                           Information

Specimen



             code = 1469)                                        Source: [facility]



                                                                 Site: Sinus: Ri

t



                                                                 Ethmoid Sinus



[facility] [id number] 04:08:00





             Test Item    Value        Reference Range Interpretation Comments

 

             Gram stain   No WBC's or                            Specimen



             isolate (test organisms seen.                           Information

Specimen



             code = 1469)                                        Source: [facility]



                                                                 Site: Sinus: Ri

t



                                                                 Ethmoid Sinus



[facility] [id number] 04:08:00





             Test Item    Value        Reference Range Interpretation Comments

 

             Gram stain   No WBC's or                            Specimen



             isolate (test organisms seen.                           Information

Specimen



             code = 1469)                                        Source: [facility]

ecimen



                                                                 Site: Sinus: Ri

ght



                                                                 Ethmoid Sinus



[facility]Presence] in Respiratory specimen 
by JUANI with probe [id number] 21:22:50





             Test Item    Value        Reference Range Interpretation Comments

 

             SARS-CoV-2 (COVID-19) RNA Not detected                           



             [Presence] in Respiratory                                        



             specimen by JUANI with probe                                        



             detection (test code = 60909-0)                                    

    

 

             Whether patient is employed in a Unknown                           

     



             healthcare setting (test code =                                    

    



             23735-7)                                            

 

             Whether the patient has symptoms Unknown                           

     



             related to condition of interest                                   

     



             (test code = 31556-0)                                        

 

             Whether the patient was Unknown                                



             hospitalized for condition of                                      

  



             interest (test code = 52540-8)                                     

   

 

             Whether the patient was admitted Unknown                           

     



             to intensive care unit (ICU) for                                   

     



             condition of interest (test code                                   

     



             = 48570-6)                                          

 

             Whether patient resides in a Unknown                               

 



             congregate care setting (test                                      

  



             code = 53596-7)                                        

 

             Pregnancy status (test code = Unknown                              

  



             88018-1)                                            

 

             Date and time of symptom onset Unknown                             

   



             (test code = 87191-1)                                        



[facility]

## 2023-09-06 NOTE — EDPHYS
Physician Documentation                                                                           

 [facility]                                                                 

Name: Jaxon Taylor                                                                             

Age: 63 yrs                                                                                       

Sex: Male                                                                                         

: 1960                                                                                   

MRN: [id number]                                                                                   

Arrival Date: 2023                                                                          

Time: 14:08                                                                                       

Account#: [id number]                                                                            

Bed 20                                                                                            

Private MD:                                                                                       

ED Physician Srinath Garrett                                                                     

HPI:                                                                                              

                                                                                             

16:47 This 63 yrs old Male presents to ER via Wheelchair with complaints of COPD Exacerbation.rt  

16:47 Patient presents to the ED with difficulty breathing. Patient states this been present  rt  

      for about 3 weeks, states that he does have a history of COPD as well as history of         

      pseudomonal pneumonia. The patient states that symptoms have worsened today. He is on 3     

      L by nasal cannula chronically, was satting about 70% on that. Increased to about 88%       

      on 6 L. Reports productive sputum with cough. Denies other acute complaints at this         

      time. Symptoms are severe in severity, no other aggravating alleviating factors..           

                                                                                                  

Historical:                                                                                       

- Allergies:                                                                                      

14:23 No Known Allergies;                                                                     ko1 

- PMHx:                                                                                           

14:23 COPD; Emphysema; osteoarthritis;                                                        ko1 

                                                                                                  

- Immunization history:: Adult Immunizations unknown.                                             

- Social history:: Smoking status: unknown.                                                       

- Family history:: not pertinent.                                                                 

                                                                                                  

                                                                                                  

ROS:                                                                                              

16:47 Constitutional: Negative for fever, chills, and weight loss, Cardiovascular: Negative   rt  

      for chest pain, palpitations, and edema, Abdomen/GI: Negative for abdominal pain,           

      nausea, vomiting, diarrhea, and constipation, MS/Extremity: Negative for injury and         

      deformity, Skin: Negative for injury, rash, and discoloration, Neuro: Negative for          

      headache, weakness, numbness, tingling, and seizure, Psych: Negative for depression,        

      anxiety, suicide ideation, homicidal ideation, and hallucinations.                          

16:47 Respiratory: Positive for cough, shortness of breath.                                       

                                                                                                  

Exam:                                                                                             

16:47 Constitutional:  This is a well developed, well nourished patient who is awake, alert,  rt  

      and in no acute distress. Head/Face:  Normocephalic, atraumatic. Chest/axilla:  Normal      

      chest wall appearance and motion.  Nontender with no deformity.  No lesions are             

      appreciated. Cardiovascular:  Regular rate and rhythm with a normal S1 and S2.  No          

      gallops, murmurs, or rubs.  Normal PMI, no JVD.  No pulse deficits. Abdomen/GI:  Soft,      

      non-tender, with normal bowel sounds.  No distension or tympany.  No guarding or            

      rebound.  No evidence of tenderness throughout. Skin:  Warm, dry with normal turgor.        

      Normal color with no rashes, no lesions, and no evidence of cellulitis. MS/ Extremity:      

      Pulses equal, no cyanosis.  Neurovascular intact.  Full, normal range of motion. Neuro:     

       Awake and alert, GCS 15, oriented to person, place, time, and situation.  Cranial          

      nerves II-XII grossly intact.  Motor strength 5/5 in all extremities.  Sensory grossly      

      intact.  Cerebellar exam normal.  Normal gait. Psych:  Awake, alert, with orientation       

      to person, place and time.  Behavior, mood, and affect are within normal limits.            

16:47 ECG was reviewed by the Attending Physician.                                                

16:47 Respiratory: Moderate severe respiratory distress with accessory muscle usage, wheezes      

      with diminished breath sounds heard in all lung fields..                                    

                                                                                                  

Vital Signs:                                                                                      

14:21  / 78; Pulse 108; Resp 24; Temp 98.8; Pulse Ox 88% on 6 lpm NC;                   ko1 

15:38  / 82; Pulse 99; Resp 22; Pulse Ox 21% on BiPAP;                                  nj1 

16:49  / 98; Pulse 93; Resp 27; Pulse Ox 94% on 4 lpm NC;                               nj1 

17:25  / 82; Pulse 90; Resp 24; Pulse Ox 95% on 4 lpm NC;                               nj1 

19:19 Pulse 89; Resp 24; Pulse Ox 98% on 4 lpm NC;                                            nj1 

                                                                                                  

MDM:                                                                                              

14:33 Patient medically screened.                                                             rt  

16:49 Differential diagnosis: CHF exacerbation, Chronic Obstructive Pulmonary Disease         rt  

      pneumonia, Pneumothorax pulmonary edema. Antibiotic administration: Data reviewed:          

      vital signs, nurses notes, lab test result(s), EKG, radiologic studies. Consideration       

      of Admission/Observation Patient was admitted/placed on observation. Management of          

      patient was discussed with the following: Hospitalist: Agrees to admit. I considered        

      the following discharge prescriptions or medication management in the emergency             

      department Medications were administered in the Emergency Department. See MAR.              

      Independent interpretation of the following test(s) in the Emergency Department X-Ray:      

      My interpretation is Bilateral consolidation seen on interpretation of the x-ray            

      images. Test considered but Not performed: CT: Low suspicion for PE, CT angiogram not       

      indicated. Care significantly affected by the following chronic conditions: Chronic         

      Obstructive Pulmonary Disease. Counseling: I had a detailed discussion with the patient     

      and/or guardian regarding the historical points, exam findings, and any diagnostic          

      results supporting the discharge/admit diagnosis, lab results, radiology results, the       

      need for further work-up and treatment in the hospital. Response to treatment: the          

      patient's symptoms have markedly improved after treatment.                                  

                                                                                                  

                                                                                             

14:45 Order name: BMP                                                                         rt  

                                                                                             

14:45 Order name: Blood Culture Adult (2)                                                     rt  

                                                                                             

14:45 Order name: CBC with Diff; Complete Time: 16:08                                         rt  

                                                                                             

14:45 Order name: CPK                                                                         rt  

                                                                                             

14:45 Order name: D-Dimer; Complete Time: 16:08                                               rt  

                                                                                             

14:45 Order name: Hepatic Function                                                            rt  

                                                                                             

14:45 Order name: Lipase                                                                      rt  

                                                                                             

14:45 Order name: Magnesium                                                                   rt  

                                                                                             

14:45 Order name: NT PRO-BNP                                                                  rt  

                                                                                             

14:45 Order name: PT-INR; Complete Time: 16:08                                                rt  

                                                                                             

14:45 Order name: Ptt, Activated; Complete Time: 16:08                                        rt  

                                                                                             

14:45 Order name: Troponin HS                                                                 rt  

                                                                                             

14:45 Order name: ABG; Complete Time: 16:08                                                   rt  

                                                                                             

16:09 Order name: Lactate w/ 2H reflex if indic.                                              rt  

                                                                                             

17:35 Order name: Magnesium                                                                   EDMS

                                                                                             

17:35 Order name: Phosphorus                                                                  EDMS

                                                                                             

17:35 Order name: Urinalysis w/ reflexes                                                      EDMS

                                                                                             

17:35 Order name: Basic Metabolic Panel                                                       EDMS

                                                                                             

17:35 Order name: Basic Metabolic Panel                                                       EDMS

                                                                                             

17:35 Order name: CBC with Automated Diff                                                     EDMS

                                                                                             

17:35 Order name: CBC with Automated Diff                                                     EDMS

                                                                                             

17:35 Order name: Lipid Profile                                                               EDMS

                                                                                             

17:35 Order name: Lipid Profile                                                               EDMS

                                                                                             

14:45 Order name: XRAY CXR (1 view); Complete Time: 16:08                                     rt  

                                                                                             

14:45 Order name: BIPAP                                                                       rt  

                                                                                             

14:45 Order name: EKG; Complete Time: 14:46                                                   rt  

                                                                                             

17:35 Order name: CONS Physician Consult                                                      EDMS

                                                                                             

17:35 Order name: Heart Healthy                                                               EDMS

                                                                                             

14:45 Order name: Cardiac monitoring; Complete Time: 15:07                                    rt  

                                                                                             

14:45 Order name: EKG - Nurse/Tech; Complete Time: 16:23                                      rt  

                                                                                             

14:45 Order name: IV Saline Lock; Complete Time: 15:37                                        rt  

                                                                                             

14:45 Order name: Labs collected and sent; Complete Time: 15:37                               rt  

                                                                                             

14:45 Order name: O2 Per Protocol; Complete Time: 15:07                                       rt  

                                                                                             

14:45 Order name: O2 Sat Monitoring; Complete Time: 15:07                                     rt  

                                                                                                  

EC:47 Rate is 94 beats/min. Rhythm is regular, Normal Sinus Rhythm with PACs, Right bundle    rt  

      branch block. ME interval is normal. QRS interval is normal. QT interval is normal. No      

      Q waves. T waves are Normal. No ST changes noted.                                           

                                                                                                  

Administered Medications:                                                                         

15:00 Drug: DuoNeb Nebulize (3:1) (2.5 mg - 0.5 mg) 3 ml Route: Nebulizer;                    [facility] 

17:38 Follow up: Response: No adverse reaction                                                [facility] 

15:30 Drug: MethylPrednisoLONE  mg Route: IVP; Site: left forearm;                     nj 

17:38 Follow up: Response: No adverse reaction                                                [facility] 

17:25 Drug: Cefepime IVPB 2 grams Route: IVPB; Rate: 200 ml/hr; Infused Over: 30 mins; Site:  [facility] 

      left forearm;                                                                               

18:04 Follow up: Response: No adverse reaction; IV Status: Completed infusion; IV Intake:     nj1 

      100ml                                                                                       

17:25 Drug: NS 0.9% IV 1000 ml Route: IV; Rate: 1 bolus; Site: left forearm;                  nj1 

20:08 Follow up: Response: No adverse reaction; IV Status: Completed infusion; IV Intake:     nj1 

      1000ml                                                                                      

18:05 Drug: vancoMYCIN IVPB 1 grams Route: IVPB; Infused Over: 2 hrs; Site: left forearm;     nj1 

20:08 Follow up: Response: No adverse reaction; IV Status: Completed infusion; IV Intake:     nj1 

      250ml                                                                                       

                                                                                                  

                                                                                                  

Disposition:                                                                                      

16:49 Critical Care:.                                                                         rt  

                                                                                                  

Disposition Summary:                                                                              

23 16:42                                                                                    

Hospitalization Ordered                                                                           

      Hospitalization Status: Inpatient Admission                                             rt  

      Provider: Naina Brand                                                                rt  

      Location: [facility] (Inpatient)                                                 rt  

      Condition: Fair                                                                         rt  

      Problem: an acute exacerbation                                                          rt  

      Symptoms: have improved                                                                 rt  

      Bed/Room Type: Standard                                                                 rt  

      Room Assignment: 405(23 18:13)                                                    bd  

      Diagnosis                                                                                   

        - COPD exacerbation                                                                   rt  

        - Bilateral pneumonia                                                                 rt  

        - Hypoxia                                                                             rt  

      Forms:                                                                                      

        - Medication Reconciliation Form                                                      rt  

        - SBAR form                                                                           rt  

        - Leadership Thank You Letter                                                         rt  

Critical care time excluding procedures:                                                          

16:49 Critical care time: Bedside Care: 30 minutes, Consultation: 5 minutes. Total time: 35   rt  

      minutes                                                                                     

                                                                                                  

Signatures:                                                                                       

Dispatcher [employer]                           EDGriselda Vargas Kathy, RN                       RN   ko1                                                  

Srinath Garrett MD MD   rt                                                   

Lizzy Newton RN                         RN   nj1                                                  

                                                                                                  

Corrections: (The following items were deleted from the chart)                                    

18:13 16:42 rt                                                                                bd  

                                                                                                  

**************************************************************************************************

## 2023-09-07 VITALS — OXYGEN SATURATION: 96 %

## 2023-09-07 LAB
BLD SMEAR INTERP: (no result)
BUN BLD-MCNC: 13 MG/DL (ref 7–18)
GLUCOSE SERPLBLD-MCNC: 146 MG/DL (ref 74–106)
HCT VFR BLD CALC: 38.7 % (ref 39.6–49)
HDLC SERPL-MCNC: 64 MG/DL (ref 40–60)
LDLC SERPL CALC-MCNC: 58 MG/DL (ref ?–130)
LYMPHOCYTES # SPEC AUTO: 0.5 K/UL (ref 0.7–4.9)
MCV RBC: 87.5 FL (ref 80–100)
MORPHOLOGY BLD-IMP: (no result)
PMV BLD: 8.3 FL (ref 7.6–11.3)
POTASSIUM SERPL-SCNC: 4.2 MEQ/L (ref 3.5–5.1)
RBC # BLD: 4.42 M/UL (ref 4.33–5.43)
WBC # BLD AUTO: 16.5 THOU/UL (ref 4.3–10.9)

## 2023-09-07 RX ADMIN — METHYLPREDNISOLONE SODIUM SUCCINATE SCH MG: 40 INJECTION, POWDER, FOR SOLUTION INTRAMUSCULAR; INTRAVENOUS at 20:16

## 2023-09-07 RX ADMIN — ALBUTEROL SULFATE SCH MG: 2.5 SOLUTION RESPIRATORY (INHALATION) at 01:15

## 2023-09-07 RX ADMIN — METHYLPREDNISOLONE SODIUM SUCCINATE SCH MG: 40 INJECTION, POWDER, FOR SOLUTION INTRAMUSCULAR; INTRAVENOUS at 09:59

## 2023-09-07 RX ADMIN — ALBUTEROL SULFATE SCH MG: 2.5 SOLUTION RESPIRATORY (INHALATION) at 07:45

## 2023-09-07 RX ADMIN — ALBUTEROL SULFATE SCH MG: 2.5 SOLUTION RESPIRATORY (INHALATION) at 13:15

## 2023-09-07 RX ADMIN — IPRATROPIUM BROMIDE SCH MG: 0.5 SOLUTION RESPIRATORY (INHALATION) at 13:15

## 2023-09-07 RX ADMIN — ENOXAPARIN SODIUM SCH: 40 INJECTION SUBCUTANEOUS at 09:00

## 2023-09-07 RX ADMIN — Medication SCH ML: at 20:17

## 2023-09-07 RX ADMIN — SODIUM CHLORIDE SCH MLS: 9 INJECTION, SOLUTION INTRAVENOUS at 20:16

## 2023-09-07 RX ADMIN — ALBUTEROL SULFATE SCH MG: 2.5 SOLUTION RESPIRATORY (INHALATION) at 20:50

## 2023-09-07 RX ADMIN — Medication SCH ML: at 09:00

## 2023-09-07 RX ADMIN — GABAPENTIN SCH MG: 300 CAPSULE ORAL at 09:59

## 2023-09-07 RX ADMIN — MUPIROCIN SCH APPL: 20 OINTMENT TOPICAL at 20:18

## 2023-09-07 RX ADMIN — Medication SCH: at 09:00

## 2023-09-07 RX ADMIN — IPRATROPIUM BROMIDE SCH MG: 0.5 SOLUTION RESPIRATORY (INHALATION) at 07:45

## 2023-09-07 RX ADMIN — ROFLUMILAST SCH MCG: 500 TABLET ORAL at 09:59

## 2023-09-07 RX ADMIN — SODIUM CHLORIDE SCH MLS: 9 INJECTION, SOLUTION INTRAVENOUS at 09:59

## 2023-09-07 RX ADMIN — IPRATROPIUM BROMIDE SCH MG: 0.5 SOLUTION RESPIRATORY (INHALATION) at 20:50

## 2023-09-07 RX ADMIN — IPRATROPIUM BROMIDE SCH MG: 0.5 SOLUTION RESPIRATORY (INHALATION) at 01:15

## 2023-09-07 RX ADMIN — ASPIRIN 81 MG SCH MG: 81 TABLET ORAL at 09:59

## 2023-09-07 NOTE — EKG
Test Date:    2023-09-06               Test Time:    16:13:00

Technician:   LUKE                                    

                                                     

MEASUREMENT RESULTS:                                       

Intervals:                                           

Rate:         94                                     

MS:           138                                    

QRSD:         114                                    

QT:           366                                    

QTc:          457                                    

Axis:                                                

P:            76                                     

MS:           138                                    

QRS:          94                                     

T:            62                                     

                                                     

INTERPRETIVE STATEMENTS:                                       

                                                     

Sinus rhythm with premature atrial complexes with aberrant conduction

Right bundle branch block

Abnormal ECG

Compared to ECG 06/06/2023 15:28:07

Atrial premature complex(es) now present

Aberrant conduction of supraventricular beat(s) now present

Sinus tachycardia no longer present

Left posterior fascicular block no longer present

Bifascicular block no longer present



Electronically Signed On 09-07-23 12:24:58 CDT by Emmanuel Rios
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
## 2023-09-08 VITALS — SYSTOLIC BLOOD PRESSURE: 101 MMHG | TEMPERATURE: 97.6 F | DIASTOLIC BLOOD PRESSURE: 61 MMHG

## 2023-09-08 RX ADMIN — ROFLUMILAST SCH MCG: 500 TABLET ORAL at 08:20

## 2023-09-08 RX ADMIN — ENOXAPARIN SODIUM SCH MG: 40 INJECTION SUBCUTANEOUS at 08:20

## 2023-09-08 RX ADMIN — METHYLPREDNISOLONE SODIUM SUCCINATE SCH MG: 40 INJECTION, POWDER, FOR SOLUTION INTRAMUSCULAR; INTRAVENOUS at 08:19

## 2023-09-08 RX ADMIN — Medication SCH: at 08:19

## 2023-09-08 RX ADMIN — MUPIROCIN SCH APPL: 20 OINTMENT TOPICAL at 08:18

## 2023-09-08 RX ADMIN — ENOXAPARIN SODIUM SCH: 40 INJECTION SUBCUTANEOUS at 08:25

## 2023-09-08 RX ADMIN — Medication SCH ML: at 08:20

## 2023-09-08 RX ADMIN — ASPIRIN 81 MG SCH MG: 81 TABLET ORAL at 08:20

## 2023-09-08 RX ADMIN — ALBUTEROL SULFATE SCH: 2.5 SOLUTION RESPIRATORY (INHALATION) at 02:00

## 2023-09-08 RX ADMIN — IPRATROPIUM BROMIDE SCH MG: 0.5 SOLUTION RESPIRATORY (INHALATION) at 07:30

## 2023-09-08 RX ADMIN — SODIUM CHLORIDE SCH MLS: 9 INJECTION, SOLUTION INTRAVENOUS at 08:18

## 2023-09-08 RX ADMIN — ALBUTEROL SULFATE SCH MG: 2.5 SOLUTION RESPIRATORY (INHALATION) at 07:30

## 2023-09-08 RX ADMIN — GABAPENTIN SCH MG: 300 CAPSULE ORAL at 08:19

## 2023-09-08 RX ADMIN — IPRATROPIUM BROMIDE SCH: 0.5 SOLUTION RESPIRATORY (INHALATION) at 02:00

## 2023-09-09 NOTE — P.PN
Subjective


Date of Service: 09/09/23


Chief Complaint: COPD exacerbation


Subjective: Improving (Patient is improving chest congestion has improved)





Review of Systems


General: Weakness


Respiratory: Cough, Shortness of Breath





Physical Examination





- Vital Signs


Temperature: 97.6 F


Blood Pressure: 101/61


Pulse: 82


Respirations: 20


Pulse Ox (%): 100





- Physical Exam


General: Alert, Oriented x3


Respiratory: Clear to auscultation bilaterally, Diminished


Cardiovascular: No edema, Normal S1 S2





Assessment And Plan





- Current Problems (Diagnosis)


(1) COPD with acute exacerbation


Status: Acute   


Plan: 


Patient admitted with COPD exacerbation 3+ gram-negative rods isolated patient 

has a midline PICC arrange for possible nebulized tobramycin and/or IV 

antibiotics pending upon the sensitivity of the organism patient's white count 

is declining he is to resume all his home medications patient has invasive 

ventilator and a vest at home make Pseudomonas colonization





Physician Review: Patient Assessed, Agree with Above Assessment and Plan